# Patient Record
Sex: FEMALE | Race: WHITE | Employment: FULL TIME | URBAN - NONMETROPOLITAN AREA
[De-identification: names, ages, dates, MRNs, and addresses within clinical notes are randomized per-mention and may not be internally consistent; named-entity substitution may affect disease eponyms.]

---

## 2017-01-25 ENCOUNTER — TELEPHONE (OUTPATIENT)
Dept: FAMILY MEDICINE CLINIC | Age: 57
End: 2017-01-25

## 2017-01-25 VITALS — SYSTOLIC BLOOD PRESSURE: 158 MMHG | DIASTOLIC BLOOD PRESSURE: 90 MMHG

## 2017-01-25 DIAGNOSIS — Z01.30 BP CHECK: Primary | ICD-10-CM

## 2017-01-30 ENCOUNTER — TELEPHONE (OUTPATIENT)
Dept: FAMILY MEDICINE CLINIC | Age: 57
End: 2017-01-30

## 2017-01-30 VITALS — DIASTOLIC BLOOD PRESSURE: 98 MMHG | SYSTOLIC BLOOD PRESSURE: 162 MMHG

## 2017-01-30 DIAGNOSIS — Z01.30 BP CHECK: Primary | ICD-10-CM

## 2017-02-10 ENCOUNTER — OFFICE VISIT (OUTPATIENT)
Dept: FAMILY MEDICINE CLINIC | Age: 57
End: 2017-02-10

## 2017-02-10 VITALS
DIASTOLIC BLOOD PRESSURE: 82 MMHG | SYSTOLIC BLOOD PRESSURE: 138 MMHG | WEIGHT: 187 LBS | BODY MASS INDEX: 29.35 KG/M2 | HEART RATE: 98 BPM | HEIGHT: 67 IN

## 2017-02-10 DIAGNOSIS — I10 ESSENTIAL HYPERTENSION: Primary | ICD-10-CM

## 2017-02-10 PROCEDURE — 99213 OFFICE O/P EST LOW 20 MIN: CPT | Performed by: NURSE PRACTITIONER

## 2017-02-10 RX ORDER — LISINOPRIL 40 MG/1
40 TABLET ORAL DAILY
Qty: 90 TABLET | Refills: 1 | Status: SHIPPED | OUTPATIENT
Start: 2017-02-10 | End: 2017-11-15 | Stop reason: SDUPTHER

## 2017-02-10 ASSESSMENT — ENCOUNTER SYMPTOMS
SHORTNESS OF BREATH: 0
COUGH: 0
WHEEZING: 0

## 2017-02-13 ENCOUNTER — NURSE ONLY (OUTPATIENT)
Dept: SURGERY | Age: 57
End: 2017-02-13

## 2017-02-13 VITALS
BODY MASS INDEX: 29.6 KG/M2 | HEIGHT: 67 IN | SYSTOLIC BLOOD PRESSURE: 150 MMHG | DIASTOLIC BLOOD PRESSURE: 90 MMHG | WEIGHT: 188.6 LBS | HEART RATE: 84 BPM

## 2017-02-13 DIAGNOSIS — Z12.11 COLON CANCER SCREENING: Primary | ICD-10-CM

## 2017-02-14 RX ORDER — POLYETHYLENE GLYCOL 3350, SODIUM CHLORIDE, SODIUM BICARBONATE, POTASSIUM CHLORIDE 420; 11.2; 5.72; 1.48 G/4L; G/4L; G/4L; G/4L
POWDER, FOR SOLUTION ORAL
Qty: 4000 ML | Refills: 0 | Status: SHIPPED | OUTPATIENT
Start: 2017-02-14 | End: 2017-06-14 | Stop reason: ALTCHOICE

## 2017-02-16 ENCOUNTER — NURSE ONLY (OUTPATIENT)
Dept: LAB | Age: 57
End: 2017-02-16

## 2017-02-16 DIAGNOSIS — Z01.30 BP CHECK: Primary | ICD-10-CM

## 2017-02-28 ENCOUNTER — TELEPHONE (OUTPATIENT)
Dept: SURGERY | Age: 57
End: 2017-02-28

## 2017-05-22 ENCOUNTER — NURSE ONLY (OUTPATIENT)
Dept: LAB | Age: 57
End: 2017-05-22

## 2017-05-22 VITALS — DIASTOLIC BLOOD PRESSURE: 94 MMHG | SYSTOLIC BLOOD PRESSURE: 142 MMHG

## 2017-05-22 DIAGNOSIS — Z01.30 BLOOD PRESSURE CHECK: Primary | ICD-10-CM

## 2017-05-23 ENCOUNTER — NURSE ONLY (OUTPATIENT)
Dept: LAB | Age: 57
End: 2017-05-23

## 2017-05-23 VITALS — SYSTOLIC BLOOD PRESSURE: 146 MMHG | DIASTOLIC BLOOD PRESSURE: 88 MMHG

## 2017-05-23 DIAGNOSIS — Z01.30 BLOOD PRESSURE CHECK: Primary | ICD-10-CM

## 2017-06-14 ENCOUNTER — OFFICE VISIT (OUTPATIENT)
Dept: FAMILY MEDICINE CLINIC | Age: 57
End: 2017-06-14
Payer: COMMERCIAL

## 2017-06-14 VITALS
SYSTOLIC BLOOD PRESSURE: 150 MMHG | DIASTOLIC BLOOD PRESSURE: 100 MMHG | BODY MASS INDEX: 29.23 KG/M2 | WEIGHT: 186.6 LBS | HEART RATE: 80 BPM

## 2017-06-14 DIAGNOSIS — E78.2 MIXED HYPERLIPIDEMIA: ICD-10-CM

## 2017-06-14 DIAGNOSIS — I10 ESSENTIAL HYPERTENSION: Primary | ICD-10-CM

## 2017-06-14 DIAGNOSIS — E11.9 TYPE 2 DIABETES MELLITUS WITHOUT COMPLICATION, UNSPECIFIED LONG TERM INSULIN USE STATUS: ICD-10-CM

## 2017-06-14 PROCEDURE — 99213 OFFICE O/P EST LOW 20 MIN: CPT | Performed by: NURSE PRACTITIONER

## 2017-06-14 RX ORDER — LISINOPRIL 20 MG/1
20 TABLET ORAL DAILY
Qty: 90 TABLET | Refills: 1 | Status: CANCELLED | OUTPATIENT
Start: 2017-06-14

## 2017-06-14 RX ORDER — HYDROCHLOROTHIAZIDE 12.5 MG/1
12.5 CAPSULE, GELATIN COATED ORAL DAILY
Qty: 90 CAPSULE | Refills: 1 | Status: SHIPPED | OUTPATIENT
Start: 2017-06-14 | End: 2017-12-01 | Stop reason: SDUPTHER

## 2017-06-14 RX ORDER — LISINOPRIL 40 MG/1
40 TABLET ORAL DAILY
Qty: 90 TABLET | Refills: 1 | Status: SHIPPED | OUTPATIENT
Start: 2017-06-14 | End: 2017-12-14 | Stop reason: SDUPTHER

## 2017-06-14 RX ORDER — ATORVASTATIN CALCIUM 10 MG/1
10 TABLET, FILM COATED ORAL DAILY
Qty: 90 TABLET | Refills: 1 | Status: SHIPPED | OUTPATIENT
Start: 2017-06-14 | End: 2017-12-14 | Stop reason: SDUPTHER

## 2017-06-14 ASSESSMENT — ENCOUNTER SYMPTOMS
COUGH: 0
WHEEZING: 0
SHORTNESS OF BREATH: 0
NAUSEA: 0
DIARRHEA: 0
BLURRED VISION: 0
VOMITING: 0

## 2017-06-14 ASSESSMENT — PATIENT HEALTH QUESTIONNAIRE - PHQ9
SUM OF ALL RESPONSES TO PHQ9 QUESTIONS 1 & 2: 0
1. LITTLE INTEREST OR PLEASURE IN DOING THINGS: 0
SUM OF ALL RESPONSES TO PHQ QUESTIONS 1-9: 0
2. FEELING DOWN, DEPRESSED OR HOPELESS: 0

## 2017-07-26 ENCOUNTER — NURSE ONLY (OUTPATIENT)
Dept: LAB | Age: 57
End: 2017-07-26

## 2017-07-26 VITALS — SYSTOLIC BLOOD PRESSURE: 130 MMHG | DIASTOLIC BLOOD PRESSURE: 78 MMHG

## 2017-07-26 DIAGNOSIS — Z01.30 BLOOD PRESSURE CHECK: Primary | ICD-10-CM

## 2017-11-08 ENCOUNTER — OFFICE VISIT (OUTPATIENT)
Dept: FAMILY MEDICINE CLINIC | Age: 57
End: 2017-11-08
Payer: COMMERCIAL

## 2017-11-08 VITALS
OXYGEN SATURATION: 98 % | HEIGHT: 67 IN | SYSTOLIC BLOOD PRESSURE: 122 MMHG | DIASTOLIC BLOOD PRESSURE: 78 MMHG | WEIGHT: 179.2 LBS | HEART RATE: 110 BPM | BODY MASS INDEX: 28.12 KG/M2

## 2017-11-08 DIAGNOSIS — R42 VERTIGO: Primary | ICD-10-CM

## 2017-11-08 DIAGNOSIS — G56.20 ULNAR TUNNEL SYNDROME, UNSPECIFIED LATERALITY: ICD-10-CM

## 2017-11-08 PROCEDURE — 99213 OFFICE O/P EST LOW 20 MIN: CPT | Performed by: NURSE PRACTITIONER

## 2017-11-08 ASSESSMENT — ENCOUNTER SYMPTOMS
SHORTNESS OF BREATH: 0
COUGH: 0
WHEEZING: 0

## 2017-11-08 NOTE — PROGRESS NOTES
Subjective:      Patient ID: Hailee Jefferson is a 56 y.o. female. Pt presents to the clinic to discuss her dizziness. Pt states that for the last 2-4 weeks she has been dizzy when she stands up. She is drinking a few beers about 3 times a week. She does smoke marijuana on a weekly basis but nothing is new. She does not drink water hardly at all. She is monitoring her blood sugars which have not changed. She does not check her BP when she gets dizzy. She states that she just feels dizzy for a few minutes when she stands up and then she is fine. She is also complaining of numbness in her 4th and 5th digits on her bilateral hands. She states that she can feel things but they feel \"weird. \"        Review of Systems   Constitutional: Negative for activity change, appetite change and fever. Respiratory: Negative for cough, shortness of breath and wheezing. Cardiovascular: Negative for chest pain and palpitations. Musculoskeletal:        Bilateral hand tingling 4th and 5th digits     Neurological: Positive for dizziness.      Past Medical History:   Diagnosis Date    Cervicogenic headache     Chronic sinusitis     Chronic sinusitis     Conductive hearing loss     Degenerative arthritis of cervical spine     Depression     Depression     Elevated fasting glucose     Elevated fasting glucose     Epigastric abdominal pain     GERD (gastroesophageal reflux disease)     GERD (gastroesophageal reflux disease)     History of cervical dysplasia 1987    LASER    History of methamphetamine abuse     (Quit 6-2001)    LSIL (low grade squamous intraepithelial lesion) on Pap smear 2002    Cryo    Tobacco abuse     Tobacco abuse        Past Surgical History:   Procedure Laterality Date    CATARACT REMOVAL Bilateral     CATARACT REMOVAL WITH IMPLANT Left 3/22/2016    Dr. Leonila Meza, [address] COLONOSCOPY  03/03/2017    Dr. Sandra Lei, [facility] OTHER SURGICAL HISTORY  03/10/1987    Laser conization of cervix (Hx of moderate dysplasia on Pap test).  UPPER GASTROINTESTINAL ENDOSCOPY  05/02/2013    Mild gastritis. Social History     Social History    Marital status:      Spouse name: N/A    Number of children: N/A    Years of education: N/A     Social History Main Topics    Smoking status: Current Every Day Smoker     Packs/day: 0.25     Years: 40.00    Smokeless tobacco: Never Used    Alcohol use 0.0 oz/week      Comment: socially    Drug use: No      Comment: Quit . [facility].     Sexual activity: Not Asked     Other Topics Concern    None     Social History Narrative    None       Family History   Problem Relation Age of Onset    Diabetes Mother     Osteoporosis Mother     High Cholesterol Father     Lung Cancer Father     Diabetes Other     Diabetes Other        No Known Allergies    Current Outpatient Prescriptions   Medication Sig Dispense Refill    metFORMIN (GLUCOPHAGE) 500 MG tablet Take 1 tablet by mouth 2 times daily 180 tablet 1    atorvastatin (LIPITOR) 10 MG tablet Take 1 tablet by mouth daily 90 tablet 1    lisinopril (PRINIVIL;ZESTRIL) 40 MG tablet Take 1 tablet by mouth daily 90 tablet 1    hydrochlorothiazide (MICROZIDE) 12.5 MG capsule Take 1 capsule by mouth daily 90 capsule 1    lisinopril (PRINIVIL;ZESTRIL) 40 MG tablet Take 1 tablet by mouth daily 90 tablet 1    Glucose Blood (BLOOD GLUCOSE TEST STRIPS) STRP Diagnosis 250.90, tests once daily 50 strip 11    Lancets MISC Diagnosis 250.90, tests once daily 50 each 11    lisinopril (PRINIVIL;ZESTRIL) 20 MG tablet Take 1 tablet by mouth daily 90 tablet 1    FLUoxetine (PROZAC) 20 MG capsule Take 60 mg by mouth daily       lamoTRIgine (LAMICTAL) 200 MG tablet Take 200 mg by mouth daily      modafinil (PROVIGIL) 200 MG tablet Take 200 mg by mouth 2 times daily      Blood Glucose Monitoring Suppl CARY Diagnosis 250.90,

## 2017-11-08 NOTE — PATIENT INSTRUCTIONS
adapted under license by [facility] ([facility]). If you have questions about a medical condition or this instruction, always ask your healthcare professional. [address] any warranty or liability for your use of this information.

## 2017-11-15 ENCOUNTER — OFFICE VISIT (OUTPATIENT)
Dept: FAMILY MEDICINE CLINIC | Age: 57
End: 2017-11-15
Payer: COMMERCIAL

## 2017-11-15 ENCOUNTER — HOSPITAL ENCOUNTER (OUTPATIENT)
Dept: LAB | Age: 57
Setting detail: SPECIMEN
Discharge: HOME | End: 2017-11-15
Payer: COMMERCIAL

## 2017-11-15 VITALS
SYSTOLIC BLOOD PRESSURE: 110 MMHG | HEART RATE: 78 BPM | DIASTOLIC BLOOD PRESSURE: 70 MMHG | HEIGHT: 66 IN | BODY MASS INDEX: 28.32 KG/M2 | WEIGHT: 176.2 LBS

## 2017-11-15 DIAGNOSIS — F10.10 ALCOHOL ABUSE: ICD-10-CM

## 2017-11-15 DIAGNOSIS — M79.671 FOOT PAIN, BILATERAL: ICD-10-CM

## 2017-11-15 DIAGNOSIS — M79.672 FOOT PAIN, BILATERAL: Primary | ICD-10-CM

## 2017-11-15 DIAGNOSIS — M79.672 FOOT PAIN, BILATERAL: ICD-10-CM

## 2017-11-15 DIAGNOSIS — M79.671 FOOT PAIN, BILATERAL: Primary | ICD-10-CM

## 2017-11-15 LAB
FOLATE: >20 NG/ML
VITAMIN B-12: 466 PG/ML (ref 211–946)

## 2017-11-15 PROCEDURE — 36415 COLL VENOUS BLD VENIPUNCTURE: CPT

## 2017-11-15 PROCEDURE — 99213 OFFICE O/P EST LOW 20 MIN: CPT | Performed by: NURSE PRACTITIONER

## 2017-11-15 PROCEDURE — 82746 ASSAY OF FOLIC ACID SERUM: CPT

## 2017-11-15 PROCEDURE — 82607 VITAMIN B-12: CPT

## 2017-11-17 ENCOUNTER — HOSPITAL ENCOUNTER (OUTPATIENT)
Dept: INTERVENTIONAL RADIOLOGY/VASCULAR | Age: 57
Discharge: HOME | End: 2017-11-17
Payer: COMMERCIAL

## 2017-11-17 DIAGNOSIS — M79.672 FOOT PAIN, BILATERAL: ICD-10-CM

## 2017-11-17 DIAGNOSIS — M79.671 FOOT PAIN, BILATERAL: ICD-10-CM

## 2017-11-17 PROCEDURE — 93922 UPR/L XTREMITY ART 2 LEVELS: CPT

## 2017-11-17 ASSESSMENT — ENCOUNTER SYMPTOMS
WHEEZING: 0
SHORTNESS OF BREATH: 0
COUGH: 0

## 2017-11-17 NOTE — PROGRESS NOTES
skin breakdown, erythema, rub spots, blisters, scaling, or ulcers. No calluses or corns. Assessment:      1. Foot pain, bilateral  Vitamin B12 & Folate    VL ANKLE ART BRACHIAL INDICES EXTREMITY BILATERAL   2. Alcohol abuse  Vitamin B12 & Folate           Plan:       Will order a B12 and folate study  Will also order CARLYN's   Will call with all results  Further treatment will be based on results  Pt to return as scheduled sooner if needed

## 2017-11-21 ENCOUNTER — TELEPHONE (OUTPATIENT)
Dept: FAMILY MEDICINE CLINIC | Age: 57
End: 2017-11-21

## 2017-11-21 DIAGNOSIS — M79.671 FOOT PAIN, BILATERAL: ICD-10-CM

## 2017-11-21 DIAGNOSIS — R29.91 ABNORMAL FINDING OF LOWER EXTREMITY: ICD-10-CM

## 2017-11-21 DIAGNOSIS — F10.10 ALCOHOL ABUSE: Primary | ICD-10-CM

## 2017-11-21 DIAGNOSIS — M79.672 FOOT PAIN, BILATERAL: ICD-10-CM

## 2017-11-21 DIAGNOSIS — E11.9 TYPE 2 DIABETES MELLITUS WITHOUT COMPLICATION, UNSPECIFIED LONG TERM INSULIN USE STATUS: ICD-10-CM

## 2017-11-21 NOTE — TELEPHONE ENCOUNTER
----- Message from Yancy Fink NP sent at 11/21/2017 11:48 AM EST -----  Normal CARLYN's but would like to refer to vascular for evaluation. Pt is due for routine labs in December and would like to order those to be done.  Please place orders for lipid panel, CMP, A1C

## 2017-12-01 RX ORDER — HYDROCHLOROTHIAZIDE 12.5 MG/1
12.5 CAPSULE, GELATIN COATED ORAL DAILY
Qty: 90 CAPSULE | Refills: 0 | Status: SHIPPED | OUTPATIENT
Start: 2017-12-01 | End: 2018-02-23 | Stop reason: SDUPTHER

## 2017-12-05 ENCOUNTER — OFFICE VISIT (OUTPATIENT)
Dept: OBGYN | Age: 57
End: 2017-12-05
Payer: COMMERCIAL

## 2017-12-05 ENCOUNTER — HOSPITAL ENCOUNTER (OUTPATIENT)
Age: 57
Setting detail: SPECIMEN
Discharge: HOME | End: 2017-12-05
Payer: COMMERCIAL

## 2017-12-05 ENCOUNTER — HOSPITAL ENCOUNTER (OUTPATIENT)
Dept: MAMMOGRAPHY | Age: 57
Discharge: HOME | End: 2017-12-05
Payer: COMMERCIAL

## 2017-12-05 VITALS
DIASTOLIC BLOOD PRESSURE: 78 MMHG | HEART RATE: 82 BPM | BODY MASS INDEX: 28.96 KG/M2 | WEIGHT: 180.2 LBS | HEIGHT: 66 IN | SYSTOLIC BLOOD PRESSURE: 120 MMHG

## 2017-12-05 DIAGNOSIS — Z01.419 WELL FEMALE EXAM WITH ROUTINE GYNECOLOGICAL EXAM: Primary | ICD-10-CM

## 2017-12-05 DIAGNOSIS — Z12.4 CERVICAL CANCER SCREENING: ICD-10-CM

## 2017-12-05 DIAGNOSIS — Z12.31 VISIT FOR SCREENING MAMMOGRAM: ICD-10-CM

## 2017-12-05 DIAGNOSIS — Z78.0 MENOPAUSE: ICD-10-CM

## 2017-12-05 DIAGNOSIS — Z12.31 SCREENING MAMMOGRAM, ENCOUNTER FOR: ICD-10-CM

## 2017-12-05 PROCEDURE — 77063 BREAST TOMOSYNTHESIS BI: CPT

## 2017-12-05 PROCEDURE — 99396 PREV VISIT EST AGE 40-64: CPT | Performed by: NURSE PRACTITIONER

## 2017-12-05 PROCEDURE — G0145 SCR C/V CYTO,THINLAYER,RESCR: HCPCS

## 2017-12-05 NOTE — PROGRESS NOTES
50 each 11    lisinopril (PRINIVIL;ZESTRIL) 20 MG tablet Take 1 tablet by mouth daily 90 tablet 1    FLUoxetine (PROZAC) 20 MG capsule Take 60 mg by mouth daily       lamoTRIgine (LAMICTAL) 200 MG tablet Take 200 mg by mouth daily      modafinil (PROVIGIL) 200 MG tablet Take 200 mg by mouth 2 times daily      Blood Glucose Monitoring Suppl CARY Diagnosis 250.90, tests once daily 1 Device 0    Cholecalciferol (VITAMIN D PO) Take by mouth      SAPHRIS 10 MG SUBL sublingual tablet       Multiple Vitamins-Minerals (MULTIVITAMIN PO) Take  by mouth daily.  omeprazole (PRILOSEC) 20 MG capsule Take 20 mg by mouth daily. No current facility-administered medications for this visit. ALLERGIES:  Allergies as of 12/05/2017    (No Known Allergies)           Gynecologic History:  Menarche: 17 yo  Menopause at 48 yo     Patient's last menstrual period was 08/28/2016. Hormone Exposure: No    Family History of Breast, Ovarian , Colon or Uterine Cancer: No     Preventative Health Testing:  Date of Last Pap Smear: 2016  Date of Last Mammogram: 2017  Date of Last Colonoscopy: 2017  Date of Last Bone Density: none  ________________________________________________________________________  REVIEW OF SYSTEMS:     A minimum of an eleven point review of systems was completed.     Review Of Systems (11 point):  General ROS:  negative  Hematological and Lymphatic ROS:negative   Breast ROS: negative  Cardiovascular ROS: negative  Respiratory ROS: negative   Gastrointestinal ROS: negative  Genito-Urinary ROS: negative  Psychological ROS: negative  Neurological ROS: negative  Musculoskeletal ROS: negative  Dermatological ROS: negative                                                                                                                                                                                   PHYSICAL Exam:     Constitutional:  Blood pressure 120/78, pulse 82, height 5' 6\" (1.676 m), weight 180 lb 3.2 oz single, nontender and anteverted    Musculosk:  Normal Gait and station was noted. Digits were evaluated without abnormal findings. Range of motion, stability and strength were evaluated and found to be appropriate for the patients age. ASSESSMENT:      56 y.o. Annual  1. Screening mammogram, encounter for  SAL DIGITAL SCREEN W CAD BILATERAL   2. Cervical cancer screening  PAP SMEAR   3. Menopause  DEXA Bone Density Axial Skeleton   4. Well female exam with routine gynecological exam          Chief Complaint   Patient presents with    Gynecologic Exam     annual exam and pap         Past Medical History:   Diagnosis Date    Cervicogenic headache     Chronic sinusitis     Chronic sinusitis     Conductive hearing loss     Degenerative arthritis of cervical spine     Depression     Depression     Elevated fasting glucose     Elevated fasting glucose     Epigastric abdominal pain     GERD (gastroesophageal reflux disease)     GERD (gastroesophageal reflux disease)     History of cervical dysplasia 1987    LASER    History of methamphetamine abuse     (Quit 6-2001)    LSIL (low grade squamous intraepithelial lesion) on Pap smear 2002    Cryo    Tobacco abuse     Tobacco abuse          Patient Active Problem List   Diagnosis    Depression    GERD (gastroesophageal reflux disease)    Tobacco abuse    Type 2 diabetes mellitus without complication ([address]    Left cataract    Alcohol abuse    Obstructive sleep apnea syndrome    Mixed hyperlipidemia    Essential hypertension          Hereditary Breast, Ovarian, Colon and Uterine Cancer screening Done. Tobacco & Secondary smoke risks reviewed; instructed on cessation and avoidance    PLAN:  Return in about 1 year (around 12/5/2018) for Annual Exam.  Repeat pap per ASCCP 2013 guidelines  Return for annual exams  Mammograms every 1 year. If 35 yo and last mammogram was negative. Routine health maintenance per patients PCP.   Orders Placed This Encounter   Procedures    SAL DIGITAL SCREEN W CAD BILATERAL     Standing Status:   Future     Standing Expiration Date:   7/28/2020     Scheduling Instructions: On the day of the exam, the patient should not wear deodorant, lotion, or powder on the breast or underarm area. Wear a two piece outfit and be prepared to give information about prior breast procedures including mammograms, biopsies, or surgeries. If you've had mammograms done elsewhere, please request any previous mammogram films from those organizations prior to your appointment. Order Specific Question:   Reason for exam:     Answer:   SCREENING MAMMOGRAM    DEXA Bone Density Axial Skeleton     Standing Status:   Future     Standing Expiration Date:   2/28/2019     Scheduling Instructions:      Do not take calcium supplements the day of test     Order Specific Question:   Reason for exam:     Answer:   Screening osteoporosis    PAP SMEAR     Patient History:    Patient's last menstrual period was 08/28/2016. OBGYN Status: Postmenopausal  Past Surgical History:  No date: CATARACT REMOVAL Bilateral  3/22/2016: CATARACT REMOVAL WITH IMPLANT Left      Comment: [facility] Teodoro Connor, [facility]  No date: [address]  03/03/2017: COLONOSCOPY      Comment: Dr. Lisa Garcia, [facility]  03/10/1987: OTHER SURGICAL HISTORY      Comment: Laser conization of cervix (Hx of moderate                dysplasia on Pap test). 05/02/2013: UPPER GASTROINTESTINAL ENDOSCOPY      Comment: Mild gastritis. Smoking status: Current Every Day Smoker                                                   Packs/day: 0.25      Years: 40.00     Smokeless tobacco: Never Used                           Standing Status:   Future     Standing Expiration Date:   12/5/2018     Order Specific Question:   Collection Type     Answer:    Thin Prep     Order Specific Question:   Prior Abnormal Pap Test     Answer:   Yes     Order Specific Question:   If Prior Abnormal, Give Date     Answer:   2002 LSIL     Order Specific Question:   Prior Treatment     Answer:   Cryotherapy     Order Specific Question:   Screening or Diagnostic     Answer:   Screening     Order Specific Question:   HPV Requested?      Answer:   Yes - If Abnormal Reflex HPV     Order Specific Question:   High Risk Patient     Answer:   N/A       Abirl Mayorga  12/5/2017      (Please note that portions of this note were completed with a voice-recognition program. Efforts were made to edit the dictations but occasionally words are mis-transcribed.)

## 2017-12-06 DIAGNOSIS — R92.8 FOLLOW-UP EXAMINATION OF ABNORMAL MAMMOGRAM: Primary | ICD-10-CM

## 2017-12-11 ENCOUNTER — HOSPITAL ENCOUNTER (OUTPATIENT)
Dept: MAMMOGRAPHY | Age: 57
Discharge: HOME | End: 2017-12-11
Payer: COMMERCIAL

## 2017-12-11 ENCOUNTER — HOSPITAL ENCOUNTER (OUTPATIENT)
Dept: ULTRASOUND IMAGING | Age: 57
Discharge: HOME | End: 2017-12-11
Payer: COMMERCIAL

## 2017-12-11 DIAGNOSIS — R92.8 FOLLOW-UP EXAMINATION OF ABNORMAL MAMMOGRAM: ICD-10-CM

## 2017-12-11 PROCEDURE — G0279 TOMOSYNTHESIS, MAMMO: HCPCS

## 2017-12-11 PROCEDURE — 76642 ULTRASOUND BREAST LIMITED: CPT

## 2017-12-12 ENCOUNTER — HOSPITAL ENCOUNTER (OUTPATIENT)
Dept: BONE DENSITY | Age: 57
Discharge: HOME | End: 2017-12-12
Payer: COMMERCIAL

## 2017-12-12 DIAGNOSIS — Z78.0 MENOPAUSE: ICD-10-CM

## 2017-12-12 LAB — CYTOLOGY REPORT: NORMAL

## 2017-12-14 ENCOUNTER — HOSPITAL ENCOUNTER (OUTPATIENT)
Dept: BONE DENSITY | Age: 57
Discharge: HOME | End: 2017-12-14
Payer: COMMERCIAL

## 2017-12-14 ENCOUNTER — OFFICE VISIT (OUTPATIENT)
Dept: FAMILY MEDICINE CLINIC | Age: 57
End: 2017-12-14
Payer: COMMERCIAL

## 2017-12-14 ENCOUNTER — HOSPITAL ENCOUNTER (OUTPATIENT)
Dept: LAB | Age: 57
Setting detail: SPECIMEN
Discharge: HOME | End: 2017-12-14
Payer: COMMERCIAL

## 2017-12-14 VITALS
HEIGHT: 66 IN | SYSTOLIC BLOOD PRESSURE: 122 MMHG | DIASTOLIC BLOOD PRESSURE: 72 MMHG | BODY MASS INDEX: 29.35 KG/M2 | HEART RATE: 92 BPM | WEIGHT: 182.6 LBS | OXYGEN SATURATION: 98 %

## 2017-12-14 DIAGNOSIS — E11.9 TYPE 2 DIABETES MELLITUS WITHOUT COMPLICATION, UNSPECIFIED LONG TERM INSULIN USE STATUS: ICD-10-CM

## 2017-12-14 DIAGNOSIS — F10.10 ALCOHOL ABUSE: ICD-10-CM

## 2017-12-14 DIAGNOSIS — E11.9 TYPE 2 DIABETES MELLITUS WITHOUT COMPLICATION, UNSPECIFIED LONG TERM INSULIN USE STATUS: Primary | ICD-10-CM

## 2017-12-14 DIAGNOSIS — M79.2 PERIPHERAL NEUROPATHIC PAIN: ICD-10-CM

## 2017-12-14 DIAGNOSIS — E78.2 MIXED HYPERLIPIDEMIA: ICD-10-CM

## 2017-12-14 DIAGNOSIS — I10 ESSENTIAL HYPERTENSION: ICD-10-CM

## 2017-12-14 LAB
ALBUMIN SERPL-MCNC: 4.7 G/DL (ref 3.5–5.2)
ALBUMIN/GLOBULIN RATIO: 1.7 (ref 1–2.5)
ALP BLD-CCNC: 156 U/L (ref 35–104)
ALT SERPL-CCNC: 44 U/L (ref 5–33)
ANION GAP SERPL CALCULATED.3IONS-SCNC: 14 MMOL/L (ref 9–17)
AST SERPL-CCNC: 34 U/L
BILIRUB SERPL-MCNC: 0.18 MG/DL (ref 0.3–1.2)
BUN BLDV-MCNC: 13 MG/DL (ref 6–20)
BUN/CREAT BLD: 14 (ref 9–20)
CALCIUM SERPL-MCNC: 10.1 MG/DL (ref 8.6–10.4)
CHLORIDE BLD-SCNC: 103 MMOL/L (ref 98–107)
CHOLESTEROL/HDL RATIO: 2.6
CHOLESTEROL: 212 MG/DL
CO2: 26 MMOL/L (ref 20–31)
CREAT SERPL-MCNC: 0.94 MG/DL (ref 0.5–0.9)
ESTIMATED AVERAGE GLUCOSE: 111 MG/DL
GFR AFRICAN AMERICAN: >60 ML/MIN
GFR NON-AFRICAN AMERICAN: >60 ML/MIN
GFR SERPL CREATININE-BSD FRML MDRD: ABNORMAL ML/MIN/{1.73_M2}
GFR SERPL CREATININE-BSD FRML MDRD: ABNORMAL ML/MIN/{1.73_M2}
GLUCOSE BLD-MCNC: 107 MG/DL (ref 70–99)
HBA1C MFR BLD: 5.5 % (ref 4.8–5.9)
HDLC SERPL-MCNC: 81 MG/DL
LDL CHOLESTEROL: 96 MG/DL (ref 0–130)
POTASSIUM SERPL-SCNC: 5.4 MMOL/L (ref 3.7–5.3)
SODIUM BLD-SCNC: 143 MMOL/L (ref 135–144)
TOTAL PROTEIN: 7.4 G/DL (ref 6.4–8.3)
TRIGL SERPL-MCNC: 174 MG/DL
VLDLC SERPL CALC-MCNC: ABNORMAL MG/DL (ref 1–30)

## 2017-12-14 PROCEDURE — 83036 HEMOGLOBIN GLYCOSYLATED A1C: CPT

## 2017-12-14 PROCEDURE — 77080 DXA BONE DENSITY AXIAL: CPT

## 2017-12-14 PROCEDURE — 80053 COMPREHEN METABOLIC PANEL: CPT

## 2017-12-14 PROCEDURE — 36415 COLL VENOUS BLD VENIPUNCTURE: CPT

## 2017-12-14 PROCEDURE — 99214 OFFICE O/P EST MOD 30 MIN: CPT | Performed by: NURSE PRACTITIONER

## 2017-12-14 PROCEDURE — 80061 LIPID PANEL: CPT

## 2017-12-14 RX ORDER — ATORVASTATIN CALCIUM 10 MG/1
10 TABLET, FILM COATED ORAL DAILY
Qty: 90 TABLET | Refills: 1 | Status: SHIPPED | OUTPATIENT
Start: 2017-12-14 | End: 2018-04-15 | Stop reason: SDUPTHER

## 2017-12-14 RX ORDER — LISINOPRIL 40 MG/1
40 TABLET ORAL DAILY
Qty: 90 TABLET | Refills: 1 | Status: SHIPPED | OUTPATIENT
Start: 2017-12-14 | End: 2018-06-14 | Stop reason: SDUPTHER

## 2017-12-14 RX ORDER — GABAPENTIN 300 MG/1
300 CAPSULE ORAL 3 TIMES DAILY
Qty: 90 CAPSULE | Refills: 3 | Status: SHIPPED | OUTPATIENT
Start: 2017-12-14 | End: 2018-01-17 | Stop reason: SDUPTHER

## 2017-12-14 RX ORDER — PREGABALIN 50 MG/1
50 CAPSULE ORAL 3 TIMES DAILY
Qty: 90 CAPSULE | Refills: 5 | Status: SHIPPED | OUTPATIENT
Start: 2017-12-14 | End: 2018-05-15

## 2017-12-14 ASSESSMENT — ENCOUNTER SYMPTOMS
VOMITING: 0
DIARRHEA: 0
SHORTNESS OF BREATH: 0
BLURRED VISION: 0
RHINORRHEA: 0
SINUS PRESSURE: 0
COUGH: 0
SORE THROAT: 0
WHEEZING: 0
SINUS PAIN: 0
NAUSEA: 0

## 2017-12-14 NOTE — TELEPHONE ENCOUNTER
----- Message from Rylan Perkins NP sent at 12/14/2017  1:23 PM EST -----  Pt's diabetes is stable. Her triglycerides are a little more elevated. She is to watch her diet and increase exercise. All her other labs are stable.

## 2017-12-14 NOTE — PROGRESS NOTES
Marymadisyn Marck received counseling on the following healthy behaviors: nutrition, exercise and medication adherence  Reviewed prior labs and health maintenance  Continue current medications, diet and exercise. Discussed use, benefit, and side effects of prescribed medications. Barriers to medication compliance addressed. Patient given educational materials - see patient instructions  Was a self-tracking handout given in paper form or via [employer]? Yes    Requested Prescriptions      No prescriptions requested or ordered in this encounter       All patient questions answered. Patient voiced understanding. Quality Measures    Body mass index is 29.92 kg/m². Elevated. Weight control planned discussed Healthy diet and regular exercise. BP: 122/72 Blood pressure is normal. Treatment plan consists of No treatment change needed.     Lab Results   Component Value Date    LDLCHOLESTEROL 55 06/27/2017    (goal LDL reduction with dx if diabetes is 50% LDL reduction)      PHQ Scores 6/14/2017   PHQ2 Score 0   PHQ9 Score 0     Interpretation of Total Score Depression Severity: 1-4 = Minimal depression, 5-9 = Mild depression, 10-14 = Moderate depression, 15-19 = Moderately severe depression, 20-27 = Severe depression

## 2017-12-14 NOTE — PROGRESS NOTES
Subjective:      Patient ID: Idalia Jackson is a 56 y.o. female. She is continuing to smoke against about medical advice. She is taking 40mg of lisinopril and 12.5mg of Hctz. She does come to the clinic to check her BP occasionally. She does check her BS and it runs about 110-130. Pt states that she drinks about 5 beers a week. She drinks on the weekends only now. Hypertension   This is a chronic problem. The current episode started more than 1 year ago. The problem is unchanged. The problem is controlled. Pertinent negatives include no anxiety, blurred vision, chest pain, headaches, palpitations, peripheral edema or shortness of breath. There are no associated agents to hypertension. Risk factors for coronary artery disease include obesity, smoking/tobacco exposure, stress, post-menopausal state and diabetes mellitus. Past treatments include ACE inhibitors and diuretics. The current treatment provides moderate improvement. Compliance problems include diet and exercise. Diabetes   She presents for her follow-up diabetic visit. She has type 2 diabetes mellitus. Disease course: due for labs. Pertinent negatives for hypoglycemia include no dizziness, headaches or nervousness/anxiousness. Associated symptoms include foot paresthesias. Pertinent negatives for diabetes include no blurred vision, no chest pain, no polydipsia and no polyuria. There are no hypoglycemic complications. Diabetic symptom progression: due for labs. Diabetic complications include peripheral neuropathy. Risk factors for coronary artery disease include diabetes mellitus, hypertension, obesity, sedentary lifestyle and tobacco exposure. Current diabetic treatment includes oral agent (monotherapy). She is compliant with treatment most of the time. Her weight is stable. She is following a generally healthy diet. Meal planning includes avoidance of concentrated sweets. She has not had a previous visit with a dietitian.  She participates in is warm and dry. Nursing note and vitals reviewed. Assessment:      1. Type 2 diabetes mellitus without complication, unspecified long term insulin use status ([facility] [address]     2. Essential hypertension     3. Mixed hyperlipidemia     4. Peripheral neuropathic pain  pregabalin (LYRICA) 50 MG capsule           Plan:    she is to get her blood work done. Will call with results  Further treatment will be based on results  Will refill medications dependent on lab results  Will try lyrica 50mg TID to see if that helps with the foot pain  Pt is to continue to monitor her BP  She is to increase her activity as tolerated  She is to keep her appt with vascular surgery   She is to return in 6 months with labs prior   Pt to return PRN      Controlled Substances Monitoring:     Attestation: The Prescription Monitoring Report for this patient was reviewed today. Deni Matos NP)  Documentation: No signs of potential drug abuse or diversion identified.  Deni Matos NP)

## 2017-12-14 NOTE — PROGRESS NOTES
Chronic Disease Visit Information    BP Readings from Last 3 Encounters:   12/14/17 122/72   12/05/17 120/78   11/15/17 110/70          Hemoglobin A1C (%)   Date Value   06/27/2017 5.4   12/01/2016 6.0 (H)   06/01/2016 7.0 (H)     Microalb/Crt. Ratio (mcg/mg creat)   Date Value   06/27/2017 11     LDL Cholesterol (mg/dL)   Date Value   06/27/2017 55     HDL (mg/dL)   Date Value   06/27/2017 125     BUN (mg/dL)   Date Value   06/27/2017 10     CREATININE (mg/dL)   Date Value   06/27/2017 0.70     Glucose (mg/dL)   Date Value   06/27/2017 109 (H)            Have you changed or started any medications since your last visit including any over-the-counter medicines, vitamins, or herbal medicines? no   Are you having any side effects from any of your medications? -  no  Have you stopped taking any of your medications? Is so, why? -  no    Have you seen any other physician or provider since your last visit? No  Have you had any other diagnostic tests since your last visit? No  Have you been seen in the emergency room and/or had an admission to a hospital since we last saw you? No  Have you had your annual diabetic retinal (eye) exam? No  Have you had your routine dental cleaning in the past 6 months? no    Have you activated your [employer] account? If not, what are your barriers?  Yes     Patient Care Team:  Cherylene Leash, MD as PCP - General (Family Medicine)         Medical History Review  Past Medical, Family, and Social History reviewed and does contribute to the patient presenting condition    Health Maintenance   Topic Date Due    Diabetic retinal exam  02/28/2017    Flu vaccine (1) 09/01/2017    Diabetic foot exam  06/14/2018    Diabetic hemoglobin A1C test  06/27/2018    Diabetic microalbuminuria test  06/27/2018    Lipid screen  06/27/2018    Breast cancer screen  12/11/2019    Cervical cancer screen  12/05/2020    Colon cancer screen colonoscopy  03/03/2022    DTaP/Tdap/Td vaccine (2 - Td) 10/08/2022    Pneumococcal med risk  Completed    Hepatitis C screen  Completed    HIV screen  Addressed

## 2018-01-12 ENCOUNTER — TELEPHONE (OUTPATIENT)
Dept: FAMILY MEDICINE CLINIC | Age: 58
End: 2018-01-12

## 2018-01-15 NOTE — TELEPHONE ENCOUNTER
Message left for patient with the recommendations from [facility]. Patient was advised to call our office if she needed a new Rx with the new directions.

## 2018-01-17 DIAGNOSIS — G57.90 NEUROPATHY OF FOOT, UNSPECIFIED LATERALITY: Primary | ICD-10-CM

## 2018-01-17 RX ORDER — GABAPENTIN 300 MG/1
CAPSULE ORAL
Qty: 270 CAPSULE | Refills: 1 | Status: SHIPPED | OUTPATIENT
Start: 2018-01-17 | End: 2018-06-14 | Stop reason: ALTCHOICE

## 2018-01-17 NOTE — TELEPHONE ENCOUNTER
Last Appt:  12/14/2017  Next Appt:   6/14/2018  Med verified in [id number] [facility] on desk for review

## 2018-01-17 NOTE — TELEPHONE ENCOUNTER
Medication refilled    Controlled Substances Monitoring:     Attestation: The Prescription Monitoring Report for this patient was reviewed today. Chris Bradshaw NP)  Documentation: No signs of potential drug abuse or diversion identified.  Chris Bradshaw NP)

## 2018-02-09 ENCOUNTER — OFFICE VISIT (OUTPATIENT)
Dept: FAMILY MEDICINE CLINIC | Age: 58
End: 2018-02-09
Payer: COMMERCIAL

## 2018-02-09 VITALS
BODY MASS INDEX: 29.92 KG/M2 | WEIGHT: 186.2 LBS | DIASTOLIC BLOOD PRESSURE: 82 MMHG | HEIGHT: 66 IN | SYSTOLIC BLOOD PRESSURE: 130 MMHG | HEART RATE: 76 BPM

## 2018-02-09 DIAGNOSIS — G62.9 NEUROPATHY: Primary | ICD-10-CM

## 2018-02-09 DIAGNOSIS — E11.9 TYPE 2 DIABETES MELLITUS WITHOUT COMPLICATION, UNSPECIFIED LONG TERM INSULIN USE STATUS: ICD-10-CM

## 2018-02-09 DIAGNOSIS — E11.9 TYPE 2 DIABETES MELLITUS WITHOUT COMPLICATION, UNSPECIFIED LONG TERM INSULIN USE STATUS: Primary | ICD-10-CM

## 2018-02-09 PROCEDURE — 99213 OFFICE O/P EST LOW 20 MIN: CPT | Performed by: NURSE PRACTITIONER

## 2018-02-09 RX ORDER — DULOXETIN HYDROCHLORIDE 30 MG/1
30 CAPSULE, DELAYED RELEASE ORAL DAILY
Qty: 30 CAPSULE | Refills: 6 | Status: SHIPPED | OUTPATIENT
Start: 2018-02-09 | End: 2018-03-23 | Stop reason: SDUPTHER

## 2018-02-09 ASSESSMENT — ENCOUNTER SYMPTOMS
COUGH: 0
SHORTNESS OF BREATH: 0
WHEEZING: 0

## 2018-02-26 RX ORDER — HYDROCHLOROTHIAZIDE 12.5 MG/1
CAPSULE, GELATIN COATED ORAL
Qty: 90 CAPSULE | Refills: 1 | Status: SHIPPED | OUTPATIENT
Start: 2018-02-26 | End: 2018-06-14 | Stop reason: SDUPTHER

## 2018-03-08 ENCOUNTER — OFFICE VISIT (OUTPATIENT)
Dept: NEUROLOGY | Age: 58
End: 2018-03-08
Payer: COMMERCIAL

## 2018-03-08 ENCOUNTER — HOSPITAL ENCOUNTER (OUTPATIENT)
Dept: LAB | Age: 58
Setting detail: SPECIMEN
Discharge: HOME | End: 2018-03-08
Payer: COMMERCIAL

## 2018-03-08 VITALS
HEART RATE: 93 BPM | SYSTOLIC BLOOD PRESSURE: 136 MMHG | BODY MASS INDEX: 29.89 KG/M2 | WEIGHT: 186 LBS | TEMPERATURE: 98.6 F | DIASTOLIC BLOOD PRESSURE: 84 MMHG | HEIGHT: 66 IN | OXYGEN SATURATION: 99 %

## 2018-03-08 DIAGNOSIS — J32.9 CHRONIC SINUSITIS, UNSPECIFIED LOCATION: ICD-10-CM

## 2018-03-08 DIAGNOSIS — M79.605 BILATERAL LEG PAIN: ICD-10-CM

## 2018-03-08 DIAGNOSIS — G47.33 OBSTRUCTIVE SLEEP APNEA SYNDROME: ICD-10-CM

## 2018-03-08 DIAGNOSIS — K21.9 GASTROESOPHAGEAL REFLUX DISEASE WITHOUT ESOPHAGITIS: ICD-10-CM

## 2018-03-08 DIAGNOSIS — G63 POLYNEUROPATHY ASSOCIATED WITH UNDERLYING DISEASE (HCC): Primary | ICD-10-CM

## 2018-03-08 DIAGNOSIS — E11.9 TYPE 2 DIABETES MELLITUS WITHOUT COMPLICATION, UNSPECIFIED LONG TERM INSULIN USE STATUS: ICD-10-CM

## 2018-03-08 DIAGNOSIS — I10 ESSENTIAL HYPERTENSION: ICD-10-CM

## 2018-03-08 DIAGNOSIS — M79.604 BILATERAL LEG PAIN: ICD-10-CM

## 2018-03-08 DIAGNOSIS — F10.10 ALCOHOL ABUSE: ICD-10-CM

## 2018-03-08 DIAGNOSIS — F32.A DEPRESSION, UNSPECIFIED DEPRESSION TYPE: ICD-10-CM

## 2018-03-08 DIAGNOSIS — G62.9 NEUROPATHY: ICD-10-CM

## 2018-03-08 DIAGNOSIS — Z72.0 TOBACCO ABUSE: ICD-10-CM

## 2018-03-08 DIAGNOSIS — E78.2 MIXED HYPERLIPIDEMIA: ICD-10-CM

## 2018-03-08 LAB
FOLATE: >20 NG/ML
TSH SERPL DL<=0.05 MIU/L-ACNC: 1.54 MIU/L (ref 0.3–5)
VITAMIN B-12: 715 PG/ML (ref 232–1245)

## 2018-03-08 PROCEDURE — 82746 ASSAY OF FOLIC ACID SERUM: CPT

## 2018-03-08 PROCEDURE — 82607 VITAMIN B-12: CPT

## 2018-03-08 PROCEDURE — 86618 LYME DISEASE ANTIBODY: CPT

## 2018-03-08 PROCEDURE — 36415 COLL VENOUS BLD VENIPUNCTURE: CPT

## 2018-03-08 PROCEDURE — 99205 OFFICE O/P NEW HI 60 MIN: CPT | Performed by: PSYCHIATRY & NEUROLOGY

## 2018-03-08 PROCEDURE — 84443 ASSAY THYROID STIM HORMONE: CPT

## 2018-03-08 ASSESSMENT — ENCOUNTER SYMPTOMS
BOWEL INCONTINENCE: 0
NAUSEA: 0
SORE THROAT: 0
VISUAL CHANGE: 0
WHEEZING: 0
SHORTNESS OF BREATH: 0
COLOR CHANGE: 0
BACK PAIN: 0
FACIAL SWELLING: 0
EYE REDNESS: 0
COUGH: 0
DIARRHEA: 0
BLOOD IN STOOL: 0
EYE ITCHING: 0
EYE PAIN: 0
CHOKING: 0
TROUBLE SWALLOWING: 0
SINUS PRESSURE: 0
CONSTIPATION: 0
VOICE CHANGE: 0
EYE DISCHARGE: 0
ABDOMINAL DISTENTION: 0
APNEA: 0
PHOTOPHOBIA: 0
VOMITING: 0
ABDOMINAL PAIN: 0
CHEST TIGHTNESS: 0

## 2018-03-08 NOTE — PATIENT INSTRUCTIONS
drink.  · Get some exercise every day. Build up slowly to 30 to 60 minutes a day on 5 or more days of the week. · Take a fiber supplement, such as Citrucel or Metamucil, every day if needed. Read and follow all instructions on the label. · Schedule time each day for a bowel movement. Having a daily routine may help. Take your time and do not strain when having a bowel movement. · Ask your doctor about a laxative. The goal is to have one easy bowel movement every 1 to 2 days. Do not let constipation go untreated for more than 3 days. When should you call for help? Call your doctor now or seek immediate medical care if:  ? · You feel sad, anxious, or hopeless for more than a few days. This could mean you are depressed. Depression is common in people who have a lot of pain. But it can be treated. ? · You have trouble with bowel movements, such as:  ¨ No bowel movement in 3 days. ¨ Blood in the anal area, in your stool, or on the toilet paper. ¨ Diarrhea for more than 24 hours. ? Watch closely for changes in your health, and be sure to contact your doctor if:  ? · Your pain is getting worse. ? · You can't sleep because of pain. ? · You are very worried or anxious about your pain. ? · You have trouble taking your pain medicine. ? · You have any concerns about your pain medicine or its side effects. ? · You have vomiting or cramps for more than 2 hours. Where can you learn more? Go to [email or web address] and sign in to your [employer] account. Enter H953 in the [facility] box to learn more about \"Neuropathic Pain: Care Instructions. \"     If you do not have an account, please click on the \"Sign Up Now\" link. Current as of: October 14, 2016  Content Version: 11.5  © 1829-0253 [employer]. Care instructions adapted under license by [facility] ([facility]).  If you have questions about a medical condition or this instruction, always ask your healthcare professional.

## 2018-03-08 NOTE — PROGRESS NOTES
[facility]  Neurology  [address] [place] [address]  Phone: [phone]   Fax: [phone]    SUBJECTIVE:     PATIENT ID:  Darnell Hubbard is a  RIGHT  HANDED 66 y.o. female. Neurologic Problem   The patient's primary symptoms include clumsiness, focal sensory loss, a loss of balance and memory loss. The patient's pertinent negatives include no altered mental status, focal weakness, near-syncope, slurred speech, syncope, visual change or weakness. This is a chronic problem. The neurological problem developed insidiously. The problem is unchanged. There was lower extremity and right-sided focality noted. Pertinent negatives include no abdominal pain, auditory change, aura, back pain, bladder incontinence, bowel incontinence, chest pain, confusion, diaphoresis, dizziness, fatigue, fever, headaches, light-headedness, nausea, neck pain, palpitations, shortness of breath, vertigo or vomiting. Past treatments include medication. The treatment provided mild relief. Her past medical history is significant for mood changes. There is no history of a bleeding disorder, a clotting disorder, a CVA, dementia, head trauma, liver disease or seizures. History obtained from  The patient   and other  available medical records were  Also  reviewed. The  Duration,  Quality,  Severity,  Location,  Timing,  Context,  Modifying  Factors   Of   The   Chief   Complaint   And  Present  Illness   Was   Reviewed   In   Chronological   Manner.                                         CURRENT PATIENT'S  MAIN  CONCERNS INCLUDE :                   1)  H/O    ANXIETY,   DEPRESSION   FOR  MORE  THAN   10  YEARS                          ON   PROZAC,  LAMICTAL,   SAPHRIS                        2)    BEING   FOLLOWED   BY    MENTAL  HEALTH  PROFESSIONALS                      3)   H/O    CHRONIC    ALCOHOL  USAGE     SINCE  TEENAGE                             H/O     CHRONIC    SMOKING      SINCE  TEENAGE MCV 99.8 06/27/2017    MCH 32.3 06/27/2017    MCHC 32.4 06/27/2017    RDW 15.1 06/27/2017     06/27/2017    MPV 9.5 06/27/2017     Chemistries  Lab Results   Component Value Date     12/14/2017    K 5.4 12/14/2017     12/14/2017    CO2 26 12/14/2017    BUN 13 12/14/2017    CREATININE 0.94 12/14/2017    CALCIUM 10.1 12/14/2017    PROT 7.4 12/14/2017    LABALBU 4.7 12/14/2017    BILITOT 0.18 12/14/2017    ALKPHOS 156 12/14/2017    AST 34 12/14/2017    ALT 44 12/14/2017     Lab Results   Component Value Date    ALKPHOS 156 12/14/2017    ALT 44 12/14/2017    AST 34 12/14/2017    PROT 7.4 12/14/2017    BILITOT 0.18 12/14/2017    LABALBU 4.7 12/14/2017     Lab Results   Component Value Date    BUN 13 12/14/2017    CREATININE 0.94 12/14/2017     Lab Results   Component Value Date    CALCIUM 10.1 12/14/2017     Lab Results   Component Value Date    AST 34 12/14/2017    ALT 44 12/14/2017       Lab Results   Component Value Date    URICACID 5.9 09/26/2014       Lab Results   Component Value Date    [id number] 008 11/15/2017       Review of Systems   Constitutional: Negative for activity change, appetite change, chills, diaphoresis, fatigue, fever and unexpected weight change. HENT: Negative for congestion, dental problem, drooling, ear discharge, ear pain, facial swelling, hearing loss, mouth sores, nosebleeds, postnasal drip, sinus pressure, sore throat, tinnitus, trouble swallowing and voice change. Eyes: Negative for photophobia, pain, discharge, redness, itching and visual disturbance. Respiratory: Negative for apnea, cough, choking, chest tightness, shortness of breath and wheezing. Cardiovascular: Negative for chest pain, palpitations, leg swelling and near-syncope. Gastrointestinal: Negative for abdominal distention, abdominal pain, blood in stool, bowel incontinence, constipation, diarrhea, nausea and vomiting.    Endocrine: Negative for cold intolerance, heat intolerance, polydipsia, inappropriate. She is not agitated, not aggressive, not hyperactive, not slowed, not withdrawn, not actively hallucinating and not combative. Thought content is not paranoid and not delusional. Cognition and memory are not impaired. She does not express impulsivity or inappropriate judgment. She exhibits a depressed mood. She expresses no homicidal and no suicidal ideation. She expresses no suicidal plans and no homicidal plans. She exhibits normal recent memory and normal remote memory. She is attentive. Neurologic Exam    NEUROLOGICAL EXAMINATION :    A) MENTAL STATUS:                   Alert and  oriented  To time, place  And  Person. No Aphasia. No  Dysarthria. Able   To  Follow three  Step commands without   Any  Difficulty. No right  To left confusion. Normal  Speech  And language function. Insight and  Judgment ,Fund  Of  Knowledge   within normal limits              Recent  And  Remote memory  within normal limits              Attention &Concentration are within normal limits                                                 B) CRANIAL NERVES :             2 CN : Visual  Acuity  And  Visual fields  within normal limits                      Fundi  Could  Not  Be  Could  Not  Be  Evaluated. 3,4,6 CN : Both  Pupils are   Reactive and  Equal.                          Extraocular   Movements  Are  Intact. No  Nystagmus. No  HARVINDER. No  Afferent  Pupillary  Defect noted. 5 CN :  Normal  Facial sensations and Corneal  Reflexes         7 CN :  Normal  Facial  Symmetry  And  Strength. No facial  Weakness.          8 CN :  Hearing  Appears within normal limits        9, 10 CN: Normal spontaneous, reflex palate movements       11 CN:   Normal  Shoulder shrug and  strength       12 CN :   Normal  Tongue movements and  Tongue  In midline                      No tongue   Fasciculations or atrophy ACTIVITIES    *   ADEQUATE   FLUID  INTAKE   AND  ELECTROLYTE  BALANCE         * KEEP  DAIRY  OF   THE  NEUROLOGICAL  SYMPTOMS      RECORDING THE    DURATION  AND  FREQUENCY. *  AVOID    CONDITIONS  AND  FACTORS   THAT  MAKE   NEUROLOGICAL  SYMPTOMS  WORSE. *  TO  MAINTAIN  REGULAR  SLEEP  WAKE  CYCLES. *   TO  HAVE  ADEQUATE  REST  AND   SLEEP    HOURS.      *    AVOID  ANY USAGE OF                 TOBACCO,  EXCESSIVE  ALCOHOL  AND   ILLEGAL   SUBSTANCES      *  CONTINUE MEDICATIONS PRESCRIBED BY NEUROLOGIST AS    RECOMMENDED     *   Compliance   With  Medications   And  Instructions      *    To  Continue  The    Antiplatelet  therapy    As   Recommended  Was   Discussed      *    Prophylactic  Use   Of     Vitamin   B   Complex,  Folic  Acid,    Vitamin  B12    Multivitamin,   Calcium  With  magnesium  And  Vit D    Supplementations   Over  The  Counter  Discussed         *  FOOT  CARE, DAILY  INSPECTION  OF  FEET   AND       PERIODIC  PODIATRY EVALUATIONS . Controlled Substances Monitoring: Attestation: The Prescription Monitoring Report for this patient was reviewed today. David Travis MD)            Orders Placed This Encounter   Procedures    XR CHEST STANDARD (2 VW)    Lyme Ab    TSH without Reflex    Vitamin B12 & Folate    Urine Drug Screen    Heavy Metals Screen, Urine    EMG               *PATIENT   TO  FOLLOW  UP  WITH   PRIMARY  CARE   AND   OTHER  CONSULTANTS  AS  BEFORE.           *TO  FOLLOW  WITH   MENTAL  HEALTH  PROFESSIONALS ,  INCLUDING          PSYCHOLOGICAL  COUNSELING   AND  PSYCHIATRIC  EVALUTIONS        *  Maintain   Healthy  Life Style    With   Periodic  Monitoring  Of    Any  Medical  Conditions  Including   Elevated  Blood  Pressure,  Lipid  Profile,   Blood  Sugar levels  And   Heart  Disease.             *   Period   Screening  For  Cancers  Involving  Breast,  Colon,  Prostate, lungs  And  Other  Organs  As  Applicable,  In consultation   With Your  Primary Care Providers. * Second  Neurological  Opinion  And  Evaluations  In  [facility]  Setting  If  Patient  Is  Interested. *  If  The  Patient remains  Neurologically  Stable   Return   To  [facility] Neurology Department   IN  1-2  MONTHS  TIME   FOR  FURTHER  FOLLOW UP.                 *  If   There is  Any  Significant  Worsening   Of  Current  Symptoms  And  Or  If patient  Develops   Any additional  New  Neurological  Symptoms  Or  Significant  Concerns   Should  Call  911 or  Go  To  Emergency  Department  For  Further  Immediate  Evaluation. *   The  Neurological   Findings,  Possible  Diagnosis,  Differential diagnoses   And  Options  For    Further   Investigations   And  management   Are  Discussed  Comprehensively. Medications   And  Prescription   Risks  And  Side effects  Are   Also  Discussed. The  Above  Were  Reviewed  With  patient and   questions  Answered  In  Detail. More   Than   50% of face  To face Time   Was  Spent  On  Counseling   And   Coordination  Of  Care   Of   Patient's multiple   Neurological  Problems   And   Comorbid  Medical   Conditions. Electronically signed by Mila Storey MD   Board Certified in  Neurology &  In  [facility] [employer] and [employer] ([employer])      DISCLAIMER:   Although every effort was made to ensure the accuracy of this  electronic transcription, some errors in transcription may have occurred. GENERAL PATIENT INSTRUCTIONS:     A Healthy Lifestyle: Care Instructions  Your Care Instructions  A healthy lifestyle can help you feel good, stay at a healthy weight, and have plenty of energy for both work and play. A healthy lifestyle is something you can share with your whole family. A healthy lifestyle also can lower your risk for serious health problems, such as high blood pressure, heart disease, and diabetes.   You

## 2018-03-09 LAB — LYME ANTIBODY: 0.17

## 2018-03-14 ENCOUNTER — HOSPITAL ENCOUNTER (OUTPATIENT)
Dept: GENERAL RADIOLOGY | Age: 58
Discharge: HOME | End: 2018-03-16
Payer: COMMERCIAL

## 2018-03-14 ENCOUNTER — TELEPHONE (OUTPATIENT)
Dept: FAMILY MEDICINE CLINIC | Age: 58
End: 2018-03-14

## 2018-03-14 ENCOUNTER — HOSPITAL ENCOUNTER (OUTPATIENT)
Dept: LAB | Age: 58
Discharge: HOME | End: 2018-03-14
Payer: COMMERCIAL

## 2018-03-14 DIAGNOSIS — M79.605 BILATERAL LEG PAIN: ICD-10-CM

## 2018-03-14 DIAGNOSIS — G62.9 NEUROPATHY: ICD-10-CM

## 2018-03-14 DIAGNOSIS — M79.604 BILATERAL LEG PAIN: ICD-10-CM

## 2018-03-14 DIAGNOSIS — E11.9 TYPE 2 DIABETES MELLITUS WITHOUT COMPLICATION, UNSPECIFIED LONG TERM INSULIN USE STATUS: ICD-10-CM

## 2018-03-14 DIAGNOSIS — G63 POLYNEUROPATHY ASSOCIATED WITH UNDERLYING DISEASE (HCC): Primary | ICD-10-CM

## 2018-03-14 LAB
AMPHETAMINE SCREEN URINE: NEGATIVE
BARBITURATE SCREEN URINE: NEGATIVE
BENZODIAZEPINE SCREEN, URINE: POSITIVE
BUPRENORPHINE URINE: NEGATIVE
CANNABINOID SCREEN URINE: POSITIVE
COCAINE METABOLITE, URINE: NEGATIVE
MDMA URINE: ABNORMAL
METHADONE SCREEN, URINE: POSITIVE
METHAMPHETAMINE, URINE: POSITIVE
OPIATES, URINE: NEGATIVE
OXYCODONE SCREEN URINE: POSITIVE
PHENCYCLIDINE, URINE: NEGATIVE
PROPOXYPHENE, URINE: NEGATIVE
TEST INFORMATION: ABNORMAL
TRICYCLIC ANTIDEPRESSANTS, UR: NEGATIVE

## 2018-03-14 PROCEDURE — 84630 ASSAY OF ZINC: CPT

## 2018-03-14 PROCEDURE — 82175 ASSAY OF ARSENIC: CPT

## 2018-03-14 PROCEDURE — 82525 ASSAY OF COPPER: CPT

## 2018-03-14 PROCEDURE — 82300 ASSAY OF CADMIUM: CPT

## 2018-03-14 PROCEDURE — 80306 DRUG TEST PRSMV INSTRMNT: CPT

## 2018-03-14 PROCEDURE — 71046 X-RAY EXAM CHEST 2 VIEWS: CPT

## 2018-03-14 PROCEDURE — 83655 ASSAY OF LEAD: CPT

## 2018-03-14 PROCEDURE — 83825 ASSAY OF MERCURY: CPT

## 2018-03-14 RX ORDER — DULOXETIN HYDROCHLORIDE 60 MG/1
60 CAPSULE, DELAYED RELEASE ORAL DAILY
Qty: 30 CAPSULE | Refills: 3 | Status: CANCELLED | OUTPATIENT
Start: 2018-03-14

## 2018-03-19 LAB
ARSENIC, URINE /24 HR: NORMAL UG/D (ref 0–49.9)
ARSENIC, URINE /VOLUME: 15.3 UG/L (ref 0–34.9)
ARSENIC, URINE RATIO CREATININE: 15.3 UG/G CRT (ref 0–29.9)
CADMIUM, URINE /24 HR: NORMAL UG/D (ref 0–3.2)
CADMIUM, URINE /VOLUME: <1 UG/L (ref 0–1)
CADMIUM, URINE RATIO CREATININE: NORMAL UG/G CRT (ref 0–3.2)
COPPER, URINE /24 HR: NORMAL UG/D (ref 3–45)
COPPER, URINE /VOLUME: 1.4 UG/DL (ref 0.3–3.2)
COPPER, URINE RATIO CREATININE: 14 UG/G CRT (ref 10–45)
CREATININE URINE /24 HR: NORMAL MG/D (ref 500–1400)
CREATININE URINE /VOLUME: 100 MG/DL
HOURS COLLECTED: NORMAL
LEAD, URINE /24 HR: NORMAL UG/D (ref 0–8.1)
LEAD, URINE /VOLUME: <5 UG/L (ref 0–1.4)
LEAD, URINE RATIO CREATININE: NORMAL UG/G CRT (ref 0–1.4)
MERCURY, URINE /24 HR: NORMAL UG/D (ref 0–2.9)
MERCURY, URINE /G CRT: NORMAL UG/G CRT (ref 0–20)
MERCURY, URINE /VOLUME: <2.5 UG/L (ref 0–1.9)
URINE VOLUME: NORMAL
ZINC, URINE /24 HR: NORMAL UG/D (ref 150–1200)
ZINC, URINE /VOLUME: 52.8 UG/DL (ref 15–120)
ZINC, URINE RATIO CREATININE: 528 UG/G CRT (ref 110–750)

## 2018-03-26 DIAGNOSIS — G63 POLYNEUROPATHY ASSOCIATED WITH UNDERLYING DISEASE (HCC): Primary | ICD-10-CM

## 2018-03-26 RX ORDER — DULOXETIN HYDROCHLORIDE 30 MG/1
30 CAPSULE, DELAYED RELEASE ORAL DAILY
Qty: 30 CAPSULE | Refills: 2 | Status: CANCELLED | OUTPATIENT
Start: 2018-03-26

## 2018-03-26 RX ORDER — DULOXETIN HYDROCHLORIDE 30 MG/1
30 CAPSULE, DELAYED RELEASE ORAL DAILY
Qty: 30 CAPSULE | Refills: 2 | Status: SHIPPED | OUTPATIENT
Start: 2018-03-26 | End: 2018-04-02 | Stop reason: DRUGHIGH

## 2018-03-28 RX ORDER — DULOXETIN HYDROCHLORIDE 60 MG/1
60 CAPSULE, DELAYED RELEASE ORAL DAILY
Qty: 30 CAPSULE | Refills: 6 | Status: SHIPPED | OUTPATIENT
Start: 2018-03-28 | End: 2018-06-14 | Stop reason: SDUPTHER

## 2018-03-29 ENCOUNTER — OFFICE VISIT (OUTPATIENT)
Dept: VASCULAR SURGERY | Age: 58
End: 2018-03-29
Payer: COMMERCIAL

## 2018-03-29 VITALS
SYSTOLIC BLOOD PRESSURE: 130 MMHG | HEIGHT: 66 IN | DIASTOLIC BLOOD PRESSURE: 80 MMHG | BODY MASS INDEX: 29.41 KG/M2 | HEART RATE: 68 BPM | WEIGHT: 183 LBS

## 2018-03-29 DIAGNOSIS — I73.9 PERIPHERAL ARTERIAL DISEASE (HCC): ICD-10-CM

## 2018-03-29 DIAGNOSIS — G63 POLYNEUROPATHY ASSOCIATED WITH UNDERLYING DISEASE (HCC): Primary | ICD-10-CM

## 2018-03-29 PROCEDURE — 99202 OFFICE O/P NEW SF 15 MIN: CPT | Performed by: SURGERY

## 2018-03-29 NOTE — PROGRESS NOTES
5    metFORMIN (GLUCOPHAGE) 500 MG tablet, Take 1 tablet by mouth 2 times daily, Disp: 180 tablet, Rfl: 1    atorvastatin (LIPITOR) 10 MG tablet, Take 1 tablet by mouth daily, Disp: 90 tablet, Rfl: 1    lisinopril (PRINIVIL;ZESTRIL) 40 MG tablet, Take 1 tablet by mouth daily, Disp: 90 tablet, Rfl: 1    Glucose Blood (BLOOD GLUCOSE TEST STRIPS) STRP, Diagnosis 250.90, tests once daily, Disp: 50 strip, Rfl: 11    Lancets MISC, Diagnosis 250.90, tests once daily, Disp: 50 each, Rfl: 11    FLUoxetine (PROZAC) 20 MG capsule, Take 60 mg by mouth daily , Disp: , Rfl:     lamoTRIgine (LAMICTAL) 200 MG tablet, Take 200 mg by mouth daily, Disp: , Rfl:     modafinil (PROVIGIL) 200 MG tablet, Take 200 mg by mouth 2 times daily, Disp: , Rfl:     Blood Glucose Monitoring Suppl [facility], Diagnosis 250.90, tests once daily, Disp: 1 Device, Rfl: 0    Cholecalciferol (VITAMIN D PO), Take by mouth, Disp: , Rfl:     SAPHRIS 10 MG SUBL sublingual tablet, , Disp: , Rfl:     Multiple Vitamins-Minerals (MULTIVITAMIN PO), Take  by mouth daily. , Disp: , Rfl:     omeprazole (PRILOSEC) 20 MG capsule, Take 20 mg by mouth daily. , Disp: , Rfl:     gabapentin (NEURONTIN) 300 MG capsule, Take 3 capsules three times a day., Disp: 270 capsule, Rfl: 1    Vital Signs:  Vitals:    03/29/18 0847   BP: 130/80   Pulse: 68       Physical Exam:  General appearance - alert, well appearing and in no acute distress  Mental status - oriented to person, place and time with normal affect  Head - normocephalic and atraumatic  Eyes - pupils equal and reactive, extraocular eye movements intact, conjunctiva clear  Ears - hearing appears to be intact  Nose - no drainage noted  Mouth - mucous membranes moist  Neck - supple, no carotid bruits, thyroid not palpable, no JVD  Chest - clear to auscultation, normal effort  Heart - normal rate, regular rhythm, no murmurs  Abdomen - soft, non-tender, non-distended, bowel sounds present all four quadrants, no masses,

## 2018-04-02 ENCOUNTER — HOSPITAL ENCOUNTER (EMERGENCY)
Age: 58
Discharge: HOME | End: 2018-04-02
Attending: EMERGENCY MEDICINE
Payer: COMMERCIAL

## 2018-04-02 ENCOUNTER — OFFICE VISIT (OUTPATIENT)
Dept: PRIMARY CARE CLINIC | Age: 58
End: 2018-04-02
Payer: COMMERCIAL

## 2018-04-02 VITALS
SYSTOLIC BLOOD PRESSURE: 128 MMHG | DIASTOLIC BLOOD PRESSURE: 69 MMHG | OXYGEN SATURATION: 98 % | WEIGHT: 175 LBS | HEART RATE: 85 BPM | TEMPERATURE: 96.4 F | BODY MASS INDEX: 28.25 KG/M2 | RESPIRATION RATE: 12 BRPM

## 2018-04-02 VITALS
SYSTOLIC BLOOD PRESSURE: 118 MMHG | DIASTOLIC BLOOD PRESSURE: 72 MMHG | BODY MASS INDEX: 28.25 KG/M2 | HEART RATE: 100 BPM | RESPIRATION RATE: 16 BRPM | TEMPERATURE: 97.1 F | WEIGHT: 175.8 LBS | OXYGEN SATURATION: 97 % | HEIGHT: 66 IN

## 2018-04-02 DIAGNOSIS — E86.0 DEHYDRATION: Primary | ICD-10-CM

## 2018-04-02 DIAGNOSIS — I48.92 ATRIAL FLUTTER, UNSPECIFIED TYPE (HCC): Primary | ICD-10-CM

## 2018-04-02 DIAGNOSIS — R42 EPISODIC LIGHTHEADEDNESS: ICD-10-CM

## 2018-04-02 DIAGNOSIS — N30.00 ACUTE CYSTITIS WITHOUT HEMATURIA: ICD-10-CM

## 2018-04-02 DIAGNOSIS — R11.0 NAUSEA: ICD-10-CM

## 2018-04-02 DIAGNOSIS — R07.89 CHEST HEAVINESS: ICD-10-CM

## 2018-04-02 LAB
-: ABNORMAL
ABSOLUTE EOS #: 0.2 K/UL (ref 0–0.4)
ABSOLUTE IMMATURE GRANULOCYTE: ABNORMAL K/UL (ref 0–0.3)
ABSOLUTE LYMPH #: 1.9 K/UL (ref 1–4.8)
ABSOLUTE MONO #: 0.9 K/UL (ref 0.1–1.2)
AMORPHOUS: ABNORMAL
ANION GAP SERPL CALCULATED.3IONS-SCNC: 16 MMOL/L (ref 9–17)
BACTERIA: ABNORMAL
BASOPHILS # BLD: 1 % (ref 0–1)
BASOPHILS ABSOLUTE: 0.1 K/UL (ref 0–0.2)
BILIRUBIN URINE: NEGATIVE
BUN BLDV-MCNC: 36 MG/DL (ref 6–20)
BUN/CREAT BLD: 18 (ref 9–20)
CALCIUM SERPL-MCNC: 11.6 MG/DL (ref 8.6–10.4)
CASTS UA: ABNORMAL /LPF (ref 0–2)
CHLORIDE BLD-SCNC: 99 MMOL/L (ref 98–107)
CO2: 28 MMOL/L (ref 20–31)
COLOR: ABNORMAL
COMMENT UA: ABNORMAL
CREAT SERPL-MCNC: 2.05 MG/DL (ref 0.5–0.9)
CRYSTALS, UA: ABNORMAL /HPF
D DIMER: <100 NG/ML
DIFFERENTIAL TYPE: ABNORMAL
EKG ATRIAL RATE: 97 BPM
EKG P AXIS: 63 DEGREES
EKG P-R INTERVAL: 134 MS
EKG Q-T INTERVAL: 362 MS
EKG QRS DURATION: 80 MS
EKG QTC CALCULATION (BAZETT): 459 MS
EKG R AXIS: 67 DEGREES
EKG T AXIS: 59 DEGREES
EKG VENTRICULAR RATE: 97 BPM
EOSINOPHILS RELATIVE PERCENT: 2 % (ref 1–7)
EPITHELIAL CELLS UA: ABNORMAL /HPF (ref 0–5)
GFR AFRICAN AMERICAN: 30 ML/MIN
GFR NON-AFRICAN AMERICAN: 25 ML/MIN
GFR SERPL CREATININE-BSD FRML MDRD: ABNORMAL ML/MIN/{1.73_M2}
GFR SERPL CREATININE-BSD FRML MDRD: ABNORMAL ML/MIN/{1.73_M2}
GLUCOSE BLD-MCNC: 108 MG/DL (ref 70–99)
GLUCOSE URINE: NEGATIVE
HCT VFR BLD CALC: 42.7 % (ref 36–46)
HEMOGLOBIN: 14.2 G/DL (ref 12–16)
IMMATURE GRANULOCYTES: ABNORMAL %
INR BLD: 1
KETONES, URINE: ABNORMAL
LEUKOCYTE ESTERASE, URINE: ABNORMAL
LYMPHOCYTES # BLD: 15 % (ref 16–46)
MCH RBC QN AUTO: 32.7 PG (ref 26–34)
MCHC RBC AUTO-ENTMCNC: 33.3 G/DL (ref 31–37)
MCV RBC AUTO: 98.1 FL (ref 80–100)
MONOCYTES # BLD: 7 % (ref 4–11)
MUCUS: ABNORMAL
NITRITE, URINE: NEGATIVE
NRBC AUTOMATED: ABNORMAL PER 100 WBC
OTHER OBSERVATIONS UA: ABNORMAL
PARTIAL THROMBOPLASTIN TIME: 23.6 SEC (ref 27–35)
PDW BLD-RTO: 12.9 % (ref 11–14.5)
PH UA: 6 (ref 5–6)
PLATELET # BLD: 352 K/UL (ref 140–450)
PLATELET ESTIMATE: ABNORMAL
PMV BLD AUTO: 9.1 FL (ref 6–12)
POTASSIUM SERPL-SCNC: 4.5 MMOL/L (ref 3.7–5.3)
PROTEIN UA: ABNORMAL
PROTHROMBIN TIME: 10.4 SEC (ref 9.4–11.3)
RBC # BLD: 4.35 M/UL (ref 4–5.2)
RBC # BLD: ABNORMAL 10*6/UL
RBC UA: ABNORMAL /HPF (ref 0–4)
RENAL EPITHELIAL, UA: ABNORMAL /HPF
SEG NEUTROPHILS: 75 % (ref 43–77)
SEGMENTED NEUTROPHILS ABSOLUTE COUNT: 9.5 K/UL (ref 1.8–7.7)
SODIUM BLD-SCNC: 143 MMOL/L (ref 135–144)
SPECIFIC GRAVITY UA: 1.02 (ref 1.01–1.02)
TRICHOMONAS: ABNORMAL
TROPONIN INTERP: NORMAL
TROPONIN T: <0.03 NG/ML
TURBIDITY: ABNORMAL
URINE HGB: NEGATIVE
UROBILINOGEN, URINE: NORMAL
WBC # BLD: 12.5 K/UL (ref 3.5–11)
WBC # BLD: ABNORMAL 10*3/UL
WBC UA: >100 /HPF (ref 0–4)
YEAST: ABNORMAL

## 2018-04-02 PROCEDURE — 93005 ELECTROCARDIOGRAM TRACING: CPT

## 2018-04-02 PROCEDURE — 96361 HYDRATE IV INFUSION ADD-ON: CPT

## 2018-04-02 PROCEDURE — 81001 URINALYSIS AUTO W/SCOPE: CPT

## 2018-04-02 PROCEDURE — 2580000003 HC RX 258: Performed by: EMERGENCY MEDICINE

## 2018-04-02 PROCEDURE — 96360 HYDRATION IV INFUSION INIT: CPT

## 2018-04-02 PROCEDURE — 80048 BASIC METABOLIC PNL TOTAL CA: CPT

## 2018-04-02 PROCEDURE — 84484 ASSAY OF TROPONIN QUANT: CPT

## 2018-04-02 PROCEDURE — 93000 ELECTROCARDIOGRAM COMPLETE: CPT | Performed by: NURSE PRACTITIONER

## 2018-04-02 PROCEDURE — 36415 COLL VENOUS BLD VENIPUNCTURE: CPT

## 2018-04-02 PROCEDURE — 85730 THROMBOPLASTIN TIME PARTIAL: CPT

## 2018-04-02 PROCEDURE — 85025 COMPLETE CBC W/AUTO DIFF WBC: CPT

## 2018-04-02 PROCEDURE — 99214 OFFICE O/P EST MOD 30 MIN: CPT | Performed by: NURSE PRACTITIONER

## 2018-04-02 PROCEDURE — 99284 EMERGENCY DEPT VISIT MOD MDM: CPT

## 2018-04-02 PROCEDURE — 85610 PROTHROMBIN TIME: CPT

## 2018-04-02 PROCEDURE — 85379 FIBRIN DEGRADATION QUANT: CPT

## 2018-04-02 RX ORDER — PEN NEEDLE, DIABETIC 31 GX5/16"
NEEDLE, DISPOSABLE MISCELLANEOUS
COMMUNITY
Start: 2018-02-09 | End: 2019-01-11 | Stop reason: SDUPTHER

## 2018-04-02 RX ORDER — 0.9 % SODIUM CHLORIDE 0.9 %
1000 INTRAVENOUS SOLUTION INTRAVENOUS ONCE
Status: COMPLETED | OUTPATIENT
Start: 2018-04-02 | End: 2018-04-02

## 2018-04-02 RX ORDER — CEPHALEXIN 500 MG/1
500 CAPSULE ORAL 3 TIMES DAILY
Qty: 30 CAPSULE | Refills: 0 | Status: SHIPPED | OUTPATIENT
Start: 2018-04-02 | End: 2018-04-12

## 2018-04-02 RX ADMIN — SODIUM CHLORIDE 1000 ML: 9 INJECTION, SOLUTION INTRAVENOUS at 12:32

## 2018-04-02 RX ADMIN — SODIUM CHLORIDE 1000 ML: 9 INJECTION, SOLUTION INTRAVENOUS at 14:22

## 2018-04-02 ASSESSMENT — ENCOUNTER SYMPTOMS
COUGH: 0
SORE THROAT: 0
NAUSEA: 1

## 2018-04-11 ENCOUNTER — HOSPITAL ENCOUNTER (EMERGENCY)
Age: 58
Discharge: HOME | End: 2018-04-11
Attending: EMERGENCY MEDICINE
Payer: COMMERCIAL

## 2018-04-11 ENCOUNTER — APPOINTMENT (OUTPATIENT)
Dept: GENERAL RADIOLOGY | Age: 58
End: 2018-04-11
Payer: COMMERCIAL

## 2018-04-11 VITALS
TEMPERATURE: 100.8 F | WEIGHT: 180 LBS | DIASTOLIC BLOOD PRESSURE: 92 MMHG | RESPIRATION RATE: 18 BRPM | HEIGHT: 67 IN | BODY MASS INDEX: 28.25 KG/M2 | OXYGEN SATURATION: 98 % | SYSTOLIC BLOOD PRESSURE: 138 MMHG | HEART RATE: 94 BPM

## 2018-04-11 DIAGNOSIS — R42 DIZZINESS: Primary | ICD-10-CM

## 2018-04-11 LAB
-: ABNORMAL
ABSOLUTE EOS #: 0.4 K/UL (ref 0–0.4)
ABSOLUTE IMMATURE GRANULOCYTE: ABNORMAL K/UL (ref 0–0.3)
ABSOLUTE LYMPH #: 1.7 K/UL (ref 1–4.8)
ABSOLUTE MONO #: 0.7 K/UL (ref 0.1–1.2)
AMORPHOUS: ABNORMAL
ANION GAP SERPL CALCULATED.3IONS-SCNC: 15 MMOL/L (ref 9–17)
BACTERIA: ABNORMAL
BASOPHILS # BLD: 1 % (ref 0–1)
BASOPHILS ABSOLUTE: 0.1 K/UL (ref 0–0.2)
BILIRUBIN URINE: NEGATIVE
BUN BLDV-MCNC: 24 MG/DL (ref 6–20)
BUN/CREAT BLD: 15 (ref 9–20)
CALCIUM SERPL-MCNC: 10.5 MG/DL (ref 8.6–10.4)
CASTS UA: ABNORMAL /LPF (ref 0–2)
CHLORIDE BLD-SCNC: 100 MMOL/L (ref 98–107)
CO2: 27 MMOL/L (ref 20–31)
COLOR: ABNORMAL
COMMENT UA: ABNORMAL
CREAT SERPL-MCNC: 1.62 MG/DL (ref 0.5–0.9)
CRYSTALS, UA: ABNORMAL /HPF
DIFFERENTIAL TYPE: ABNORMAL
EKG ATRIAL RATE: 98 BPM
EKG P AXIS: 47 DEGREES
EKG P-R INTERVAL: 132 MS
EKG Q-T INTERVAL: 368 MS
EKG QRS DURATION: 86 MS
EKG QTC CALCULATION (BAZETT): 469 MS
EKG R AXIS: 60 DEGREES
EKG T AXIS: 60 DEGREES
EKG VENTRICULAR RATE: 98 BPM
EOSINOPHILS RELATIVE PERCENT: 3 % (ref 1–7)
EPITHELIAL CELLS UA: ABNORMAL /HPF (ref 0–5)
GFR AFRICAN AMERICAN: 40 ML/MIN
GFR NON-AFRICAN AMERICAN: 33 ML/MIN
GFR SERPL CREATININE-BSD FRML MDRD: ABNORMAL ML/MIN/{1.73_M2}
GFR SERPL CREATININE-BSD FRML MDRD: ABNORMAL ML/MIN/{1.73_M2}
GLUCOSE BLD-MCNC: 107 MG/DL (ref 65–105)
GLUCOSE BLD-MCNC: 131 MG/DL (ref 70–99)
GLUCOSE URINE: NEGATIVE
HCT VFR BLD CALC: 40.7 % (ref 36–46)
HEMOGLOBIN: 13.9 G/DL (ref 12–16)
IMMATURE GRANULOCYTES: ABNORMAL %
INR BLD: 1
KETONES, URINE: NEGATIVE
LACTIC ACID: 1.3 MMOL/L (ref 0.5–2.2)
LEUKOCYTE ESTERASE, URINE: NEGATIVE
LYMPHOCYTES # BLD: 16 % (ref 16–46)
MCH RBC QN AUTO: 32.6 PG (ref 26–34)
MCHC RBC AUTO-ENTMCNC: 34.1 G/DL (ref 31–37)
MCV RBC AUTO: 95.7 FL (ref 80–100)
MONOCYTES # BLD: 7 % (ref 4–11)
MUCUS: ABNORMAL
NITRITE, URINE: NEGATIVE
NRBC AUTOMATED: ABNORMAL PER 100 WBC
OTHER OBSERVATIONS UA: ABNORMAL
PDW BLD-RTO: 12.6 % (ref 11–14.5)
PH UA: 7 (ref 5–6)
PLATELET # BLD: 353 K/UL (ref 140–450)
PLATELET ESTIMATE: ABNORMAL
PMV BLD AUTO: 8.8 FL (ref 6–12)
POC TROPONIN I: <0.02 NG/ML (ref 0–0.08)
POC TROPONIN INTERP: NORMAL
POTASSIUM SERPL-SCNC: 4.1 MMOL/L (ref 3.7–5.3)
PROTEIN UA: NEGATIVE
PROTHROMBIN TIME: 10.1 SEC (ref 9.4–11.3)
RBC # BLD: 4.25 M/UL (ref 4–5.2)
RBC # BLD: ABNORMAL 10*6/UL
RBC UA: ABNORMAL /HPF (ref 0–4)
RENAL EPITHELIAL, UA: ABNORMAL /HPF
SEG NEUTROPHILS: 73 % (ref 43–77)
SEGMENTED NEUTROPHILS ABSOLUTE COUNT: 8 K/UL (ref 1.8–7.7)
SODIUM BLD-SCNC: 142 MMOL/L (ref 135–144)
SPECIFIC GRAVITY UA: 1.02 (ref 1.01–1.02)
TRICHOMONAS: ABNORMAL
TURBIDITY: ABNORMAL
URINE HGB: NEGATIVE
UROBILINOGEN, URINE: NORMAL
WBC # BLD: 10.9 K/UL (ref 3.5–11)
WBC # BLD: ABNORMAL 10*3/UL
WBC UA: ABNORMAL /HPF (ref 0–4)
YEAST: ABNORMAL

## 2018-04-11 PROCEDURE — 99284 EMERGENCY DEPT VISIT MOD MDM: CPT

## 2018-04-11 PROCEDURE — 71046 X-RAY EXAM CHEST 2 VIEWS: CPT

## 2018-04-11 PROCEDURE — 84484 ASSAY OF TROPONIN QUANT: CPT

## 2018-04-11 PROCEDURE — 93005 ELECTROCARDIOGRAM TRACING: CPT

## 2018-04-11 PROCEDURE — 36415 COLL VENOUS BLD VENIPUNCTURE: CPT

## 2018-04-11 PROCEDURE — 85610 PROTHROMBIN TIME: CPT

## 2018-04-11 PROCEDURE — 87040 BLOOD CULTURE FOR BACTERIA: CPT

## 2018-04-11 PROCEDURE — 87086 URINE CULTURE/COLONY COUNT: CPT

## 2018-04-11 PROCEDURE — 2580000003 HC RX 258: Performed by: EMERGENCY MEDICINE

## 2018-04-11 PROCEDURE — 81001 URINALYSIS AUTO W/SCOPE: CPT

## 2018-04-11 PROCEDURE — 83605 ASSAY OF LACTIC ACID: CPT

## 2018-04-11 PROCEDURE — 85025 COMPLETE CBC W/AUTO DIFF WBC: CPT

## 2018-04-11 PROCEDURE — 96374 THER/PROPH/DIAG INJ IV PUSH: CPT

## 2018-04-11 PROCEDURE — 6360000002 HC RX W HCPCS: Performed by: EMERGENCY MEDICINE

## 2018-04-11 PROCEDURE — 80048 BASIC METABOLIC PNL TOTAL CA: CPT

## 2018-04-11 PROCEDURE — 82947 ASSAY GLUCOSE BLOOD QUANT: CPT

## 2018-04-11 RX ORDER — ONDANSETRON 4 MG/1
4 TABLET, ORALLY DISINTEGRATING ORAL EVERY 8 HOURS PRN
Qty: 20 TABLET | Refills: 0 | Status: SHIPPED | OUTPATIENT
Start: 2018-04-11 | End: 2020-08-13

## 2018-04-11 RX ORDER — ONDANSETRON 2 MG/ML
4 INJECTION INTRAMUSCULAR; INTRAVENOUS ONCE
Status: COMPLETED | OUTPATIENT
Start: 2018-04-11 | End: 2018-04-11

## 2018-04-11 RX ORDER — 0.9 % SODIUM CHLORIDE 0.9 %
1000 INTRAVENOUS SOLUTION INTRAVENOUS ONCE
Status: COMPLETED | OUTPATIENT
Start: 2018-04-11 | End: 2018-04-11

## 2018-04-11 RX ADMIN — ONDANSETRON 4 MG: 2 INJECTION INTRAMUSCULAR; INTRAVENOUS at 14:55

## 2018-04-11 RX ADMIN — SODIUM CHLORIDE 1000 ML: 9 INJECTION, SOLUTION INTRAVENOUS at 14:52

## 2018-04-11 ASSESSMENT — ENCOUNTER SYMPTOMS
NAUSEA: 1
ABDOMINAL PAIN: 0
EYE PAIN: 0
DIARRHEA: 0
VOMITING: 0
CONSTIPATION: 0
BLOOD IN STOOL: 0
SHORTNESS OF BREATH: 0
BACK PAIN: 0
COUGH: 0

## 2018-04-13 ENCOUNTER — OFFICE VISIT (OUTPATIENT)
Dept: FAMILY MEDICINE CLINIC | Age: 58
End: 2018-04-13
Payer: COMMERCIAL

## 2018-04-13 VITALS
WEIGHT: 177.2 LBS | OXYGEN SATURATION: 98 % | HEIGHT: 67 IN | BODY MASS INDEX: 27.81 KG/M2 | SYSTOLIC BLOOD PRESSURE: 100 MMHG | HEART RATE: 118 BPM | DIASTOLIC BLOOD PRESSURE: 60 MMHG | TEMPERATURE: 99 F

## 2018-04-13 DIAGNOSIS — F19.10 MULTIPLE SUBSTANCE ABUSE (HCC): ICD-10-CM

## 2018-04-13 DIAGNOSIS — R00.0 TACHYCARDIA: ICD-10-CM

## 2018-04-13 DIAGNOSIS — R42 DIZZINESS: Primary | ICD-10-CM

## 2018-04-13 LAB
CULTURE: NO GROWTH
CULTURE: NORMAL
Lab: NORMAL
SPECIMEN DESCRIPTION: NORMAL
SPECIMEN DESCRIPTION: NORMAL
STATUS: NORMAL

## 2018-04-13 PROCEDURE — 99214 OFFICE O/P EST MOD 30 MIN: CPT | Performed by: NURSE PRACTITIONER

## 2018-04-15 DIAGNOSIS — E78.2 MIXED HYPERLIPIDEMIA: ICD-10-CM

## 2018-04-15 ASSESSMENT — ENCOUNTER SYMPTOMS
WHEEZING: 0
COUGH: 0
SHORTNESS OF BREATH: 0

## 2018-04-16 RX ORDER — ATORVASTATIN CALCIUM 10 MG/1
TABLET, FILM COATED ORAL
Qty: 90 TABLET | Refills: 1 | Status: SHIPPED | OUTPATIENT
Start: 2018-04-16 | End: 2018-09-21 | Stop reason: SDUPTHER

## 2018-04-17 LAB
CULTURE: NORMAL
Lab: NORMAL
Lab: NORMAL
SPECIMEN DESCRIPTION: NORMAL
STATUS: NORMAL
STATUS: NORMAL

## 2018-04-18 ENCOUNTER — HOSPITAL ENCOUNTER (OUTPATIENT)
Age: 58
Setting detail: SPECIMEN
Discharge: HOME | End: 2018-04-18
Payer: COMMERCIAL

## 2018-04-18 ENCOUNTER — TELEPHONE (OUTPATIENT)
Dept: FAMILY MEDICINE CLINIC | Age: 58
End: 2018-04-18

## 2018-04-18 ENCOUNTER — OFFICE VISIT (OUTPATIENT)
Dept: FAMILY MEDICINE CLINIC | Age: 58
End: 2018-04-18
Payer: COMMERCIAL

## 2018-04-18 VITALS
WEIGHT: 177 LBS | HEART RATE: 88 BPM | DIASTOLIC BLOOD PRESSURE: 58 MMHG | SYSTOLIC BLOOD PRESSURE: 98 MMHG | BODY MASS INDEX: 27.78 KG/M2 | HEIGHT: 67 IN | TEMPERATURE: 97.6 F

## 2018-04-18 DIAGNOSIS — K21.9 GASTROESOPHAGEAL REFLUX DISEASE WITHOUT ESOPHAGITIS: ICD-10-CM

## 2018-04-18 DIAGNOSIS — R31.9 URINARY TRACT INFECTION WITH HEMATURIA, SITE UNSPECIFIED: Primary | ICD-10-CM

## 2018-04-18 DIAGNOSIS — G63 POLYNEUROPATHY ASSOCIATED WITH UNDERLYING DISEASE (HCC): ICD-10-CM

## 2018-04-18 DIAGNOSIS — R31.9 URINARY TRACT INFECTION WITH HEMATURIA, SITE UNSPECIFIED: ICD-10-CM

## 2018-04-18 DIAGNOSIS — N39.0 URINARY TRACT INFECTION WITH HEMATURIA, SITE UNSPECIFIED: ICD-10-CM

## 2018-04-18 DIAGNOSIS — F10.10 ALCOHOL ABUSE: ICD-10-CM

## 2018-04-18 DIAGNOSIS — N39.0 URINARY TRACT INFECTION WITH HEMATURIA, SITE UNSPECIFIED: Primary | ICD-10-CM

## 2018-04-18 LAB
-: ABNORMAL
AMORPHOUS: ABNORMAL
BACTERIA: ABNORMAL
BILIRUBIN URINE: NEGATIVE
CASTS UA: ABNORMAL /LPF (ref 0–2)
COLOR: ABNORMAL
COMMENT UA: ABNORMAL
CRYSTALS, UA: ABNORMAL /HPF
EPITHELIAL CELLS UA: ABNORMAL /HPF (ref 0–5)
GLUCOSE URINE: NEGATIVE
KETONES, URINE: NEGATIVE
LEUKOCYTE ESTERASE, URINE: ABNORMAL
MUCUS: ABNORMAL
NITRITE, URINE: NEGATIVE
OTHER OBSERVATIONS UA: ABNORMAL
PH UA: 5.5 (ref 5–6)
PROTEIN UA: NEGATIVE
RBC UA: ABNORMAL /HPF (ref 0–4)
RENAL EPITHELIAL, UA: ABNORMAL /HPF
SPECIFIC GRAVITY UA: 1.02 (ref 1.01–1.02)
TRICHOMONAS: ABNORMAL
TURBIDITY: ABNORMAL
URINE HGB: NEGATIVE
UROBILINOGEN, URINE: NORMAL
WBC UA: ABNORMAL /HPF (ref 0–4)
YEAST: ABNORMAL

## 2018-04-18 PROCEDURE — 87086 URINE CULTURE/COLONY COUNT: CPT

## 2018-04-18 PROCEDURE — 81001 URINALYSIS AUTO W/SCOPE: CPT

## 2018-04-18 PROCEDURE — 99213 OFFICE O/P EST LOW 20 MIN: CPT | Performed by: NURSE PRACTITIONER

## 2018-04-18 RX ORDER — CIPROFLOXACIN 500 MG/1
500 TABLET, FILM COATED ORAL 2 TIMES DAILY
Qty: 20 TABLET | Refills: 0 | Status: SHIPPED | OUTPATIENT
Start: 2018-04-18 | End: 2018-04-28

## 2018-04-18 RX ORDER — DULOXETIN HYDROCHLORIDE 30 MG/1
30 CAPSULE, DELAYED RELEASE ORAL DAILY
Qty: 30 CAPSULE | Refills: 3 | Status: SHIPPED | OUTPATIENT
Start: 2018-04-18 | End: 2018-06-14 | Stop reason: SDUPTHER

## 2018-04-18 ASSESSMENT — ENCOUNTER SYMPTOMS
SHORTNESS OF BREATH: 0
VOMITING: 0
COUGH: 0
WHEEZING: 0

## 2018-04-19 LAB
CULTURE: NORMAL
CULTURE: NORMAL
Lab: NORMAL
SPECIMEN DESCRIPTION: NORMAL
SPECIMEN DESCRIPTION: NORMAL
STATUS: NORMAL

## 2018-04-23 ENCOUNTER — NURSE ONLY (OUTPATIENT)
Dept: LAB | Age: 58
End: 2018-04-23

## 2018-04-23 VITALS — DIASTOLIC BLOOD PRESSURE: 80 MMHG | HEART RATE: 104 BPM | SYSTOLIC BLOOD PRESSURE: 112 MMHG

## 2018-04-23 DIAGNOSIS — Z01.30 BP CHECK: Primary | ICD-10-CM

## 2018-04-25 ENCOUNTER — TELEPHONE (OUTPATIENT)
Dept: FAMILY MEDICINE CLINIC | Age: 58
End: 2018-04-25

## 2018-04-25 DIAGNOSIS — K21.9 GASTROESOPHAGEAL REFLUX DISEASE WITHOUT ESOPHAGITIS: ICD-10-CM

## 2018-04-25 DIAGNOSIS — F10.10 ALCOHOL ABUSE: ICD-10-CM

## 2018-04-25 DIAGNOSIS — R10.9 ABDOMINAL PAIN, UNSPECIFIED ABDOMINAL LOCATION: Primary | ICD-10-CM

## 2018-05-02 ENCOUNTER — NURSE ONLY (OUTPATIENT)
Dept: LAB | Age: 58
End: 2018-05-02

## 2018-05-02 VITALS — HEART RATE: 103 BPM | DIASTOLIC BLOOD PRESSURE: 74 MMHG | SYSTOLIC BLOOD PRESSURE: 110 MMHG

## 2018-05-02 DIAGNOSIS — Z01.30 BP CHECK: Primary | ICD-10-CM

## 2018-05-15 ENCOUNTER — INITIAL CONSULT (OUTPATIENT)
Dept: SURGERY | Age: 58
End: 2018-05-15
Payer: COMMERCIAL

## 2018-05-15 ENCOUNTER — HOSPITAL ENCOUNTER (OUTPATIENT)
Dept: LAB | Age: 58
Setting detail: SPECIMEN
Discharge: HOME | End: 2018-05-15
Payer: COMMERCIAL

## 2018-05-15 VITALS
SYSTOLIC BLOOD PRESSURE: 102 MMHG | WEIGHT: 175 LBS | HEART RATE: 88 BPM | DIASTOLIC BLOOD PRESSURE: 62 MMHG | TEMPERATURE: 97.2 F | BODY MASS INDEX: 27.47 KG/M2 | HEIGHT: 67 IN

## 2018-05-15 DIAGNOSIS — K75.9 HEPATITIS: Primary | ICD-10-CM

## 2018-05-15 DIAGNOSIS — R10.13 EPIGASTRIC PAIN: ICD-10-CM

## 2018-05-15 DIAGNOSIS — R94.4 ABNORMAL RENAL FUNCTION TEST: ICD-10-CM

## 2018-05-15 DIAGNOSIS — R11.0 NAUSEA: ICD-10-CM

## 2018-05-15 DIAGNOSIS — R79.89 ABNORMAL LIVER FUNCTION TESTS: ICD-10-CM

## 2018-05-15 DIAGNOSIS — E83.52 HYPERCALCEMIA: ICD-10-CM

## 2018-05-15 DIAGNOSIS — R11.0 NAUSEA: Primary | ICD-10-CM

## 2018-05-15 LAB
ALBUMIN SERPL-MCNC: 4.7 G/DL (ref 3.5–5.2)
ALBUMIN/GLOBULIN RATIO: 1.5 (ref 1–2.5)
ALP BLD-CCNC: 156 U/L (ref 35–104)
ALT SERPL-CCNC: 40 U/L (ref 5–33)
ANION GAP SERPL CALCULATED.3IONS-SCNC: 12 MMOL/L (ref 9–17)
AST SERPL-CCNC: 36 U/L
BILIRUB SERPL-MCNC: 0.22 MG/DL (ref 0.3–1.2)
BUN BLDV-MCNC: 15 MG/DL (ref 6–20)
BUN/CREAT BLD: 10 (ref 9–20)
CALCIUM SERPL-MCNC: 10.7 MG/DL (ref 8.6–10.4)
CHLORIDE BLD-SCNC: 98 MMOL/L (ref 98–107)
CO2: 29 MMOL/L (ref 20–31)
CREAT SERPL-MCNC: 1.43 MG/DL (ref 0.5–0.9)
GFR AFRICAN AMERICAN: 46 ML/MIN
GFR NON-AFRICAN AMERICAN: 38 ML/MIN
GFR SERPL CREATININE-BSD FRML MDRD: ABNORMAL ML/MIN/{1.73_M2}
GFR SERPL CREATININE-BSD FRML MDRD: ABNORMAL ML/MIN/{1.73_M2}
GLUCOSE BLD-MCNC: 104 MG/DL (ref 70–99)
POTASSIUM SERPL-SCNC: 4.8 MMOL/L (ref 3.7–5.3)
SODIUM BLD-SCNC: 139 MMOL/L (ref 135–144)
TOTAL PROTEIN: 7.9 G/DL (ref 6.4–8.3)

## 2018-05-15 PROCEDURE — 36415 COLL VENOUS BLD VENIPUNCTURE: CPT

## 2018-05-15 PROCEDURE — 80053 COMPREHEN METABOLIC PANEL: CPT

## 2018-05-15 PROCEDURE — 99203 OFFICE O/P NEW LOW 30 MIN: CPT | Performed by: SURGERY

## 2018-05-15 ASSESSMENT — ENCOUNTER SYMPTOMS
ABDOMINAL PAIN: 1
VOICE CHANGE: 0
NAUSEA: 1
CHOKING: 0
VOMITING: 0
CHEST TIGHTNESS: 0
SHORTNESS OF BREATH: 0
SORE THROAT: 0
WHEEZING: 0
TROUBLE SWALLOWING: 0

## 2018-05-17 ENCOUNTER — TELEPHONE (OUTPATIENT)
Dept: PRIMARY CARE CLINIC | Age: 58
End: 2018-05-17

## 2018-05-21 ENCOUNTER — HOSPITAL ENCOUNTER (OUTPATIENT)
Dept: LAB | Age: 58
Setting detail: SPECIMEN
Discharge: HOME | End: 2018-05-21
Payer: COMMERCIAL

## 2018-05-21 DIAGNOSIS — E83.52 HYPERCALCEMIA: ICD-10-CM

## 2018-05-21 DIAGNOSIS — K75.9 HEPATITIS: ICD-10-CM

## 2018-05-21 LAB
CALCIUM IONIZED: 1.33 MMOL/L (ref 1.13–1.33)
HBV SURFACE AB TITR SER: <3.5 MIU/ML
HEPATITIS B SURFACE ANTIGEN: NONREACTIVE
HEPATITIS C ANTIBODY: NONREACTIVE
PTH INTACT: 40.45 PG/ML (ref 15–65)

## 2018-05-21 PROCEDURE — 86803 HEPATITIS C AB TEST: CPT

## 2018-05-21 PROCEDURE — 36415 COLL VENOUS BLD VENIPUNCTURE: CPT

## 2018-05-21 PROCEDURE — 82330 ASSAY OF CALCIUM: CPT

## 2018-05-21 PROCEDURE — 86317 IMMUNOASSAY INFECTIOUS AGENT: CPT

## 2018-05-21 PROCEDURE — 83970 ASSAY OF PARATHORMONE: CPT

## 2018-05-21 PROCEDURE — 87340 HEPATITIS B SURFACE AG IA: CPT

## 2018-06-14 ENCOUNTER — HOSPITAL ENCOUNTER (OUTPATIENT)
Dept: LAB | Age: 58
Setting detail: SPECIMEN
Discharge: HOME | End: 2018-06-14
Payer: COMMERCIAL

## 2018-06-14 ENCOUNTER — OFFICE VISIT (OUTPATIENT)
Dept: FAMILY MEDICINE CLINIC | Age: 58
End: 2018-06-14
Payer: COMMERCIAL

## 2018-06-14 VITALS
WEIGHT: 171 LBS | HEART RATE: 78 BPM | DIASTOLIC BLOOD PRESSURE: 60 MMHG | BODY MASS INDEX: 27.48 KG/M2 | HEIGHT: 66 IN | SYSTOLIC BLOOD PRESSURE: 92 MMHG

## 2018-06-14 DIAGNOSIS — K21.9 GASTROESOPHAGEAL REFLUX DISEASE WITHOUT ESOPHAGITIS: ICD-10-CM

## 2018-06-14 DIAGNOSIS — E11.9 TYPE 2 DIABETES MELLITUS WITHOUT COMPLICATION, UNSPECIFIED LONG TERM INSULIN USE STATUS: ICD-10-CM

## 2018-06-14 DIAGNOSIS — G63 POLYNEUROPATHY ASSOCIATED WITH UNDERLYING DISEASE (HCC): ICD-10-CM

## 2018-06-14 DIAGNOSIS — I10 ESSENTIAL HYPERTENSION: ICD-10-CM

## 2018-06-14 DIAGNOSIS — E78.2 MIXED HYPERLIPIDEMIA: ICD-10-CM

## 2018-06-14 DIAGNOSIS — E11.9 TYPE 2 DIABETES MELLITUS WITHOUT COMPLICATION, UNSPECIFIED LONG TERM INSULIN USE STATUS: Primary | ICD-10-CM

## 2018-06-14 LAB
ALBUMIN SERPL-MCNC: 4.5 G/DL (ref 3.5–5.2)
ALBUMIN/GLOBULIN RATIO: 1.7 (ref 1–2.5)
ALP BLD-CCNC: 113 U/L (ref 35–104)
ALT SERPL-CCNC: 24 U/L (ref 5–33)
ANION GAP SERPL CALCULATED.3IONS-SCNC: 12 MMOL/L (ref 9–17)
AST SERPL-CCNC: 21 U/L
BILIRUB SERPL-MCNC: 0.38 MG/DL (ref 0.3–1.2)
BUN BLDV-MCNC: 10 MG/DL (ref 6–20)
BUN/CREAT BLD: 7 (ref 9–20)
CALCIUM SERPL-MCNC: 9.9 MG/DL (ref 8.6–10.4)
CHLORIDE BLD-SCNC: 91 MMOL/L (ref 98–107)
CHOLESTEROL/HDL RATIO: 2.5
CHOLESTEROL: 172 MG/DL
CO2: 29 MMOL/L (ref 20–31)
CREAT SERPL-MCNC: 1.41 MG/DL (ref 0.5–0.9)
ESTIMATED AVERAGE GLUCOSE: 108 MG/DL
GFR AFRICAN AMERICAN: 47 ML/MIN
GFR NON-AFRICAN AMERICAN: 39 ML/MIN
GFR SERPL CREATININE-BSD FRML MDRD: ABNORMAL ML/MIN/{1.73_M2}
GFR SERPL CREATININE-BSD FRML MDRD: ABNORMAL ML/MIN/{1.73_M2}
GLUCOSE BLD-MCNC: 105 MG/DL (ref 70–99)
HBA1C MFR BLD: 5.4 % (ref 4.8–5.9)
HDLC SERPL-MCNC: 70 MG/DL
LDL CHOLESTEROL: 73 MG/DL (ref 0–130)
POTASSIUM SERPL-SCNC: 5.1 MMOL/L (ref 3.7–5.3)
SODIUM BLD-SCNC: 132 MMOL/L (ref 135–144)
TOTAL PROTEIN: 7.2 G/DL (ref 6.4–8.3)
TRIGL SERPL-MCNC: 144 MG/DL
VLDLC SERPL CALC-MCNC: NORMAL MG/DL (ref 1–30)

## 2018-06-14 PROCEDURE — 80053 COMPREHEN METABOLIC PANEL: CPT

## 2018-06-14 PROCEDURE — 80061 LIPID PANEL: CPT

## 2018-06-14 PROCEDURE — 36415 COLL VENOUS BLD VENIPUNCTURE: CPT

## 2018-06-14 PROCEDURE — 83036 HEMOGLOBIN GLYCOSYLATED A1C: CPT

## 2018-06-14 PROCEDURE — 99214 OFFICE O/P EST MOD 30 MIN: CPT | Performed by: NURSE PRACTITIONER

## 2018-06-14 RX ORDER — DULOXETIN HYDROCHLORIDE 60 MG/1
60 CAPSULE, DELAYED RELEASE ORAL DAILY
Qty: 90 CAPSULE | Refills: 1 | Status: SHIPPED | OUTPATIENT
Start: 2018-06-14 | End: 2019-03-07 | Stop reason: ALTCHOICE

## 2018-06-14 RX ORDER — LISINOPRIL 40 MG/1
40 TABLET ORAL DAILY
Qty: 90 TABLET | Refills: 1 | Status: SHIPPED | OUTPATIENT
Start: 2018-06-14 | End: 2019-01-04 | Stop reason: SDUPTHER

## 2018-06-14 RX ORDER — DULOXETIN HYDROCHLORIDE 30 MG/1
30 CAPSULE, DELAYED RELEASE ORAL DAILY
Qty: 30 CAPSULE | Refills: 3 | Status: SHIPPED | OUTPATIENT
Start: 2018-06-14 | End: 2019-01-11

## 2018-06-14 RX ORDER — HYDROCHLOROTHIAZIDE 12.5 MG/1
CAPSULE, GELATIN COATED ORAL
Qty: 90 CAPSULE | Refills: 1 | Status: SHIPPED | OUTPATIENT
Start: 2018-06-14 | End: 2019-03-07

## 2018-06-14 RX ORDER — OMEPRAZOLE 20 MG/1
20 CAPSULE, DELAYED RELEASE ORAL DAILY
Qty: 90 CAPSULE | Refills: 1 | Status: SHIPPED | OUTPATIENT
Start: 2018-06-14 | End: 2019-07-11 | Stop reason: SDUPTHER

## 2018-06-14 ASSESSMENT — PATIENT HEALTH QUESTIONNAIRE - PHQ9
2. FEELING DOWN, DEPRESSED OR HOPELESS: 1
SUM OF ALL RESPONSES TO PHQ9 QUESTIONS 1 & 2: 2
1. LITTLE INTEREST OR PLEASURE IN DOING THINGS: 1
SUM OF ALL RESPONSES TO PHQ QUESTIONS 1-9: 2

## 2018-06-14 ASSESSMENT — ENCOUNTER SYMPTOMS
CONSTIPATION: 0
SINUS PRESSURE: 0
BLURRED VISION: 0
COUGH: 0
VOMITING: 0
ABDOMINAL PAIN: 0
HEARTBURN: 0
SHORTNESS OF BREATH: 0
WHEEZING: 0
SORE THROAT: 0
NAUSEA: 0
ROS SKIN COMMENTS: TOENAIL FUNGUS
SINUS PAIN: 0
DIARRHEA: 0
RHINORRHEA: 0

## 2018-08-14 ENCOUNTER — TELEPHONE (OUTPATIENT)
Dept: FAMILY MEDICINE CLINIC | Age: 58
End: 2018-08-14

## 2018-08-14 DIAGNOSIS — I10 ESSENTIAL HYPERTENSION: Primary | ICD-10-CM

## 2018-08-14 RX ORDER — MINERAL OIL 100 MG/100ML
OIL ORAL; TOPICAL
Qty: 1 DEVICE | Refills: 0 | Status: SHIPPED | OUTPATIENT
Start: 2018-08-14

## 2018-08-14 NOTE — TELEPHONE ENCOUNTER
Pt returning nurses call, states she wants a complete BP monitor sent to [facility].

## 2018-09-10 ENCOUNTER — TELEPHONE (OUTPATIENT)
Dept: FAMILY MEDICINE CLINIC | Age: 58
End: 2018-09-10

## 2018-09-10 NOTE — TELEPHONE ENCOUNTER
Spoke with patient and patient is having a problem with her insurance company covering her ER visit from 04/11/2018. Patient called our office and was recommended to go to the ER because she had passed out. Patient would like a letter that states that we did recommend her to the ER because of syncope episodes. Patient is aware that [facility]SS is out office until 09/18/2018 and patient is ok to wait until [facility] gets back.

## 2018-09-21 DIAGNOSIS — E78.2 MIXED HYPERLIPIDEMIA: ICD-10-CM

## 2018-09-24 RX ORDER — ATORVASTATIN CALCIUM 10 MG/1
TABLET, FILM COATED ORAL
Qty: 90 TABLET | Refills: 1 | Status: SHIPPED | OUTPATIENT
Start: 2018-09-24 | End: 2019-01-04 | Stop reason: SDUPTHER

## 2018-11-19 ENCOUNTER — TELEPHONE (OUTPATIENT)
Dept: FAMILY MEDICINE CLINIC | Age: 58
End: 2018-11-19

## 2018-11-19 NOTE — TELEPHONE ENCOUNTER
Pt calling stating she has diabetic nerve pain and is requesting a script for compression socks to be sent to [facility], please advise pt is you can do this at above number.

## 2018-12-06 ENCOUNTER — HOSPITAL ENCOUNTER (OUTPATIENT)
Age: 58
Setting detail: SPECIMEN
Discharge: HOME | End: 2018-12-06
Payer: COMMERCIAL

## 2018-12-06 ENCOUNTER — HOSPITAL ENCOUNTER (OUTPATIENT)
Dept: MAMMOGRAPHY | Age: 58
Discharge: HOME | End: 2018-12-08
Payer: COMMERCIAL

## 2018-12-06 ENCOUNTER — OFFICE VISIT (OUTPATIENT)
Dept: OBGYN | Age: 58
End: 2018-12-06
Payer: COMMERCIAL

## 2018-12-06 VITALS
DIASTOLIC BLOOD PRESSURE: 80 MMHG | WEIGHT: 154 LBS | RESPIRATION RATE: 18 BRPM | SYSTOLIC BLOOD PRESSURE: 124 MMHG | BODY MASS INDEX: 24.75 KG/M2 | HEART RATE: 82 BPM | HEIGHT: 66 IN

## 2018-12-06 DIAGNOSIS — Z12.31 VISIT FOR SCREENING MAMMOGRAM: ICD-10-CM

## 2018-12-06 DIAGNOSIS — Z12.4 SCREENING FOR CERVICAL CANCER: ICD-10-CM

## 2018-12-06 DIAGNOSIS — Z01.419 WELL FEMALE EXAM WITH ROUTINE GYNECOLOGICAL EXAM: Primary | ICD-10-CM

## 2018-12-06 DIAGNOSIS — Z12.31 SCREENING MAMMOGRAM, ENCOUNTER FOR: ICD-10-CM

## 2018-12-06 PROCEDURE — 77067 SCR MAMMO BI INCL CAD: CPT

## 2018-12-06 PROCEDURE — G0145 SCR C/V CYTO,THINLAYER,RESCR: HCPCS

## 2018-12-06 PROCEDURE — 99396 PREV VISIT EST AGE 40-64: CPT | Performed by: NURSE PRACTITIONER

## 2018-12-06 ASSESSMENT — PATIENT HEALTH QUESTIONNAIRE - PHQ9
SUM OF ALL RESPONSES TO PHQ9 QUESTIONS 1 & 2: 0
2. FEELING DOWN, DEPRESSED OR HOPELESS: 0
1. LITTLE INTEREST OR PLEASURE IN DOING THINGS: 0
SUM OF ALL RESPONSES TO PHQ QUESTIONS 1-9: 0
SUM OF ALL RESPONSES TO PHQ QUESTIONS 1-9: 0

## 2018-12-06 NOTE — PROGRESS NOTES
________________________________________________________________________  REVIEW OF SYSTEMS:     A minimum of an eleven point review of systems was completed. Review Of Systems (11 point):  General ROS:  positive for weight loss  Hematological and Lymphatic ROS:negative   Breast ROS: negative  Cardiovascular ROS: negative  Respiratory ROS: negative   Gastrointestinal ROS: negative  Genito-Urinary ROS: negative  Psychological ROS: negative  Neurological ROS: negative  Musculoskeletal ROS: negative  Dermatological ROS: negative                                                                                                                                                                                   PHYSICAL Exam:     Constitutional:  Blood pressure 124/80, pulse 82, resp. rate 18, height 5' 5.98\" (1.676 m), weight 154 lb (69.9 kg), last menstrual period 08/28/2016, not currently breastfeeding. General Appearance: This  is a well Developed, well Nourished, well groomed female. Her BMI was reviewed. Skin:  There was a Normal Inspection of the skin without rashes or lesions. There were no rashes. (Papular, Maculopapular, Hives, Pustular, Macular)     There were no lesions (Ulcers, Erythema, Abn. Appearing Nevi)      Lymphatic:  No Lymph Nodes were Palpable in the neck , axilla or groin. Neck and EENT:  The neck was supple. There were no masses   The thyroid was not enlarged and had no masses. PERRLA, Nares Patent No Masses    Respiratory: The lungs were auscultated and found to be clear. There were no rales, rhonchi or wheezes. There was a good respiratory effort. Cardiovascular: The heart was in a regular rate and rhythm. . No S3 or S4. There was no murmur appreciated. Extremities: The patients extremities were without calf tenderness, edema, or varicosities. There was full range of motion in all four extremities. Pulses in all four extremities    Abdomen: The abdomen was soft

## 2018-12-11 ENCOUNTER — HOSPITAL ENCOUNTER (OUTPATIENT)
Dept: LAB | Age: 58
Discharge: HOME | End: 2018-12-11
Payer: COMMERCIAL

## 2018-12-11 DIAGNOSIS — E78.2 MIXED HYPERLIPIDEMIA: ICD-10-CM

## 2018-12-11 DIAGNOSIS — E11.9 TYPE 2 DIABETES MELLITUS WITHOUT COMPLICATION (HCC): ICD-10-CM

## 2018-12-11 DIAGNOSIS — G63 POLYNEUROPATHY ASSOCIATED WITH UNDERLYING DISEASE (HCC): ICD-10-CM

## 2018-12-11 LAB
ABSOLUTE EOS #: 0.2 K/UL (ref 0–0.4)
ABSOLUTE IMMATURE GRANULOCYTE: ABNORMAL K/UL (ref 0–0.3)
ABSOLUTE LYMPH #: 2.3 K/UL (ref 1–4.8)
ABSOLUTE MONO #: 0.6 K/UL (ref 0.1–1.2)
ALBUMIN SERPL-MCNC: 4.9 G/DL (ref 3.5–5.2)
ALBUMIN/GLOBULIN RATIO: 1.7 (ref 1–2.5)
ALP BLD-CCNC: 133 U/L (ref 35–104)
ALT SERPL-CCNC: 41 U/L (ref 5–33)
ANION GAP SERPL CALCULATED.3IONS-SCNC: 13 MMOL/L (ref 9–17)
AST SERPL-CCNC: 28 U/L
BASOPHILS # BLD: 1 % (ref 0–1)
BASOPHILS ABSOLUTE: 0.1 K/UL (ref 0–0.2)
BILIRUB SERPL-MCNC: 0.27 MG/DL (ref 0.3–1.2)
BUN BLDV-MCNC: 28 MG/DL (ref 6–20)
BUN/CREAT BLD: 16 (ref 9–20)
CALCIUM SERPL-MCNC: 10.2 MG/DL (ref 8.6–10.4)
CHLORIDE BLD-SCNC: 102 MMOL/L (ref 98–107)
CHOLESTEROL/HDL RATIO: 2.3
CHOLESTEROL: 177 MG/DL
CO2: 25 MMOL/L (ref 20–31)
CREAT SERPL-MCNC: 1.72 MG/DL (ref 0.5–0.9)
CREATININE URINE: 171.1 MG/DL (ref 28–217)
DIFFERENTIAL TYPE: ABNORMAL
EOSINOPHILS RELATIVE PERCENT: 3 % (ref 1–7)
ESTIMATED AVERAGE GLUCOSE: 108 MG/DL
GFR AFRICAN AMERICAN: 37 ML/MIN
GFR NON-AFRICAN AMERICAN: 31 ML/MIN
GFR SERPL CREATININE-BSD FRML MDRD: ABNORMAL ML/MIN/{1.73_M2}
GFR SERPL CREATININE-BSD FRML MDRD: ABNORMAL ML/MIN/{1.73_M2}
GLUCOSE BLD-MCNC: 101 MG/DL (ref 70–99)
HBA1C MFR BLD: 5.4 % (ref 4.8–5.9)
HCT VFR BLD CALC: 38 % (ref 36–46)
HDLC SERPL-MCNC: 78 MG/DL
HEMOGLOBIN: 12.5 G/DL (ref 12–16)
IMMATURE GRANULOCYTES: ABNORMAL %
LDL CHOLESTEROL: 77 MG/DL (ref 0–130)
LYMPHOCYTES # BLD: 25 % (ref 16–46)
MCH RBC QN AUTO: 32.8 PG (ref 26–34)
MCHC RBC AUTO-ENTMCNC: 32.9 G/DL (ref 31–37)
MCV RBC AUTO: 99.5 FL (ref 80–100)
MICROALBUMIN/CREAT 24H UR: 19 MG/L
MICROALBUMIN/CREAT UR-RTO: 11 MCG/MG CREAT
MONOCYTES # BLD: 7 % (ref 4–11)
NRBC AUTOMATED: ABNORMAL PER 100 WBC
PDW BLD-RTO: 13.2 % (ref 11–14.5)
PLATELET # BLD: 365 K/UL (ref 140–450)
PLATELET ESTIMATE: ABNORMAL
PMV BLD AUTO: 8.3 FL (ref 6–12)
POTASSIUM SERPL-SCNC: 4.5 MMOL/L (ref 3.7–5.3)
RBC # BLD: 3.82 M/UL (ref 4–5.2)
RBC # BLD: ABNORMAL 10*6/UL
SEG NEUTROPHILS: 64 % (ref 43–77)
SEGMENTED NEUTROPHILS ABSOLUTE COUNT: 5.9 K/UL (ref 1.8–7.7)
SODIUM BLD-SCNC: 140 MMOL/L (ref 135–144)
TOTAL PROTEIN: 7.8 G/DL (ref 6.4–8.3)
TRIGL SERPL-MCNC: 111 MG/DL
VLDLC SERPL CALC-MCNC: NORMAL MG/DL (ref 1–30)
WBC # BLD: 9.2 K/UL (ref 3.5–11)
WBC # BLD: ABNORMAL 10*3/UL

## 2018-12-11 PROCEDURE — 85025 COMPLETE CBC W/AUTO DIFF WBC: CPT

## 2018-12-11 PROCEDURE — 36415 COLL VENOUS BLD VENIPUNCTURE: CPT

## 2018-12-11 PROCEDURE — 80061 LIPID PANEL: CPT

## 2018-12-11 PROCEDURE — 82570 ASSAY OF URINE CREATININE: CPT

## 2018-12-11 PROCEDURE — 82043 UR ALBUMIN QUANTITATIVE: CPT

## 2018-12-11 PROCEDURE — 80053 COMPREHEN METABOLIC PANEL: CPT

## 2018-12-11 PROCEDURE — 83036 HEMOGLOBIN GLYCOSYLATED A1C: CPT

## 2018-12-18 LAB — CYTOLOGY REPORT: NORMAL

## 2019-01-04 ENCOUNTER — OFFICE VISIT (OUTPATIENT)
Dept: FAMILY MEDICINE CLINIC | Age: 59
End: 2019-01-04
Payer: COMMERCIAL

## 2019-01-04 VITALS
HEIGHT: 66 IN | WEIGHT: 160 LBS | HEART RATE: 96 BPM | BODY MASS INDEX: 25.71 KG/M2 | DIASTOLIC BLOOD PRESSURE: 62 MMHG | SYSTOLIC BLOOD PRESSURE: 102 MMHG

## 2019-01-04 DIAGNOSIS — G63 POLYNEUROPATHY ASSOCIATED WITH UNDERLYING DISEASE (HCC): ICD-10-CM

## 2019-01-04 DIAGNOSIS — E11.9 TYPE 2 DIABETES MELLITUS WITHOUT COMPLICATION, UNSPECIFIED WHETHER LONG TERM INSULIN USE (HCC): Primary | ICD-10-CM

## 2019-01-04 DIAGNOSIS — R79.89 ELEVATED SERUM CREATININE: ICD-10-CM

## 2019-01-04 DIAGNOSIS — Z72.0 TOBACCO ABUSE: ICD-10-CM

## 2019-01-04 DIAGNOSIS — I10 ESSENTIAL HYPERTENSION: ICD-10-CM

## 2019-01-04 DIAGNOSIS — F32.A DEPRESSION, UNSPECIFIED DEPRESSION TYPE: ICD-10-CM

## 2019-01-04 DIAGNOSIS — E78.2 MIXED HYPERLIPIDEMIA: ICD-10-CM

## 2019-01-04 DIAGNOSIS — K21.9 GASTROESOPHAGEAL REFLUX DISEASE WITHOUT ESOPHAGITIS: ICD-10-CM

## 2019-01-04 PROCEDURE — 99214 OFFICE O/P EST MOD 30 MIN: CPT | Performed by: NURSE PRACTITIONER

## 2019-01-04 RX ORDER — ATORVASTATIN CALCIUM 10 MG/1
TABLET, FILM COATED ORAL
Qty: 90 TABLET | Refills: 1 | Status: SHIPPED | OUTPATIENT
Start: 2019-01-04 | End: 2019-07-11 | Stop reason: SDUPTHER

## 2019-01-04 RX ORDER — BUPROPION HYDROCHLORIDE 150 MG/1
150 TABLET, EXTENDED RELEASE ORAL 2 TIMES DAILY
Qty: 60 TABLET | Refills: 3 | Status: SHIPPED | OUTPATIENT
Start: 2019-01-04 | End: 2019-06-05 | Stop reason: SDUPTHER

## 2019-01-04 RX ORDER — LISINOPRIL 40 MG/1
40 TABLET ORAL DAILY
Qty: 90 TABLET | Refills: 1 | Status: SHIPPED | OUTPATIENT
Start: 2019-01-04 | End: 2019-07-11 | Stop reason: SDUPTHER

## 2019-01-04 RX ORDER — OMEPRAZOLE 20 MG/1
20 CAPSULE, DELAYED RELEASE ORAL DAILY
Qty: 90 CAPSULE | Refills: 1 | Status: CANCELLED | OUTPATIENT
Start: 2019-01-04

## 2019-01-04 ASSESSMENT — ENCOUNTER SYMPTOMS
SINUS PRESSURE: 0
DIARRHEA: 0
RHINORRHEA: 0
SORE THROAT: 0
NAUSEA: 0
VOMITING: 0
WHEEZING: 0
SHORTNESS OF BREATH: 0
ABDOMINAL PAIN: 0
CONSTIPATION: 0
BLURRED VISION: 0
HEARTBURN: 0
SINUS PAIN: 0
COUGH: 0

## 2019-01-04 ASSESSMENT — PATIENT HEALTH QUESTIONNAIRE - PHQ9
1. LITTLE INTEREST OR PLEASURE IN DOING THINGS: 0
SUM OF ALL RESPONSES TO PHQ QUESTIONS 1-9: 0
2. FEELING DOWN, DEPRESSED OR HOPELESS: 0
SUM OF ALL RESPONSES TO PHQ QUESTIONS 1-9: 0
SUM OF ALL RESPONSES TO PHQ9 QUESTIONS 1 & 2: 0

## 2019-01-05 ASSESSMENT — ENCOUNTER SYMPTOMS: VISUAL CHANGE: 0

## 2019-01-11 ENCOUNTER — HOSPITAL ENCOUNTER (OUTPATIENT)
Dept: LAB | Age: 59
Discharge: HOME | End: 2019-01-11
Payer: COMMERCIAL

## 2019-01-11 ENCOUNTER — OFFICE VISIT (OUTPATIENT)
Dept: PRIMARY CARE CLINIC | Age: 59
End: 2019-01-11
Payer: COMMERCIAL

## 2019-01-11 ENCOUNTER — TELEPHONE (OUTPATIENT)
Dept: FAMILY MEDICINE CLINIC | Age: 59
End: 2019-01-11

## 2019-01-11 VITALS
SYSTOLIC BLOOD PRESSURE: 106 MMHG | BODY MASS INDEX: 26.58 KG/M2 | RESPIRATION RATE: 18 BRPM | DIASTOLIC BLOOD PRESSURE: 70 MMHG | HEIGHT: 66 IN | TEMPERATURE: 97.3 F | WEIGHT: 165.4 LBS | HEART RATE: 82 BPM | OXYGEN SATURATION: 99 %

## 2019-01-11 DIAGNOSIS — R26.89 BALANCE PROBLEM: ICD-10-CM

## 2019-01-11 DIAGNOSIS — R42 DIZZINESS: ICD-10-CM

## 2019-01-11 DIAGNOSIS — R42 DIZZINESS: Primary | ICD-10-CM

## 2019-01-11 DIAGNOSIS — R47.89 SPELL OF CHANGE IN SPEECH: ICD-10-CM

## 2019-01-11 LAB
GLUCOSE BLD-MCNC: 94 MG/DL
TSH SERPL DL<=0.05 MIU/L-ACNC: 1.15 MIU/L (ref 0.3–5)

## 2019-01-11 PROCEDURE — 36415 COLL VENOUS BLD VENIPUNCTURE: CPT

## 2019-01-11 PROCEDURE — 84443 ASSAY THYROID STIM HORMONE: CPT

## 2019-01-11 PROCEDURE — 82962 GLUCOSE BLOOD TEST: CPT | Performed by: FAMILY MEDICINE

## 2019-01-11 PROCEDURE — 99214 OFFICE O/P EST MOD 30 MIN: CPT | Performed by: FAMILY MEDICINE

## 2019-01-11 RX ORDER — FLUOXETINE HYDROCHLORIDE 20 MG/1
60 CAPSULE ORAL DAILY
COMMUNITY
End: 2021-10-22 | Stop reason: ALTCHOICE

## 2019-01-11 ASSESSMENT — ENCOUNTER SYMPTOMS
ABDOMINAL PAIN: 0
VOMITING: 0
DIARRHEA: 0

## 2019-01-17 ENCOUNTER — TELEPHONE (OUTPATIENT)
Dept: FAMILY MEDICINE CLINIC | Age: 59
End: 2019-01-17

## 2019-01-17 RX ORDER — GABAPENTIN 300 MG/1
300 CAPSULE ORAL 3 TIMES DAILY
Qty: 270 CAPSULE | Refills: 1 | OUTPATIENT
Start: 2019-01-17 | End: 2019-04-17

## 2019-02-25 DIAGNOSIS — E11.9 TYPE 2 DIABETES MELLITUS WITHOUT COMPLICATION, UNSPECIFIED WHETHER LONG TERM INSULIN USE (HCC): ICD-10-CM

## 2019-02-25 DIAGNOSIS — G63 POLYNEUROPATHY ASSOCIATED WITH UNDERLYING DISEASE (HCC): Primary | ICD-10-CM

## 2019-02-26 RX ORDER — GABAPENTIN 300 MG/1
300 CAPSULE ORAL 3 TIMES DAILY
Qty: 270 CAPSULE | Refills: 0 | Status: CANCELLED | OUTPATIENT
Start: 2019-02-26 | End: 2019-03-28

## 2019-03-07 ENCOUNTER — OFFICE VISIT (OUTPATIENT)
Dept: FAMILY MEDICINE CLINIC | Age: 59
End: 2019-03-07
Payer: COMMERCIAL

## 2019-03-07 VITALS
SYSTOLIC BLOOD PRESSURE: 122 MMHG | OXYGEN SATURATION: 99 % | HEIGHT: 67 IN | DIASTOLIC BLOOD PRESSURE: 72 MMHG | WEIGHT: 159 LBS | HEART RATE: 86 BPM | BODY MASS INDEX: 24.96 KG/M2

## 2019-03-07 DIAGNOSIS — G63 POLYNEUROPATHY ASSOCIATED WITH UNDERLYING DISEASE (HCC): Primary | ICD-10-CM

## 2019-03-07 DIAGNOSIS — E11.9 TYPE 2 DIABETES MELLITUS WITHOUT COMPLICATION, UNSPECIFIED WHETHER LONG TERM INSULIN USE (HCC): ICD-10-CM

## 2019-03-07 PROCEDURE — 99213 OFFICE O/P EST LOW 20 MIN: CPT | Performed by: NURSE PRACTITIONER

## 2019-03-07 RX ORDER — OLANZAPINE 10 MG/1
TABLET ORAL
Refills: 0 | COMMUNITY
Start: 2019-01-25 | End: 2021-03-18 | Stop reason: ALTCHOICE

## 2019-03-07 RX ORDER — GABAPENTIN 300 MG/1
300 CAPSULE ORAL 3 TIMES DAILY
Qty: 270 CAPSULE | Refills: 0 | Status: SHIPPED | OUTPATIENT
Start: 2019-03-07 | End: 2019-03-21 | Stop reason: SINTOL

## 2019-03-07 ASSESSMENT — ENCOUNTER SYMPTOMS
RESPIRATORY NEGATIVE: 1
BLURRED VISION: 0
GASTROINTESTINAL NEGATIVE: 1

## 2019-03-15 ENCOUNTER — OFFICE VISIT (OUTPATIENT)
Dept: PRIMARY CARE CLINIC | Age: 59
End: 2019-03-15
Payer: COMMERCIAL

## 2019-03-15 ENCOUNTER — HOSPITAL ENCOUNTER (OUTPATIENT)
Age: 59
Setting detail: SPECIMEN
Discharge: HOME | End: 2019-03-15
Payer: COMMERCIAL

## 2019-03-15 VITALS
WEIGHT: 166 LBS | OXYGEN SATURATION: 94 % | DIASTOLIC BLOOD PRESSURE: 82 MMHG | HEIGHT: 66 IN | SYSTOLIC BLOOD PRESSURE: 136 MMHG | HEART RATE: 77 BPM | RESPIRATION RATE: 14 BRPM | BODY MASS INDEX: 26.68 KG/M2

## 2019-03-15 DIAGNOSIS — R47.1 DYSARTHRIA: Primary | ICD-10-CM

## 2019-03-15 DIAGNOSIS — R30.0 DYSURIA: ICD-10-CM

## 2019-03-15 DIAGNOSIS — N30.00 ACUTE CYSTITIS WITHOUT HEMATURIA: ICD-10-CM

## 2019-03-15 DIAGNOSIS — R25.3 MUSCLE TWITCHING: ICD-10-CM

## 2019-03-15 LAB
-: ABNORMAL
AMORPHOUS: ABNORMAL
BACTERIA: ABNORMAL
BILIRUBIN URINE: NEGATIVE
CASTS UA: ABNORMAL /LPF (ref 0–2)
COLOR: ABNORMAL
COMMENT UA: ABNORMAL
CRYSTALS, UA: ABNORMAL /HPF
EPITHELIAL CELLS UA: ABNORMAL /HPF (ref 0–5)
GLUCOSE URINE: NEGATIVE
KETONES, URINE: NEGATIVE
LEUKOCYTE ESTERASE, URINE: ABNORMAL
MUCUS: ABNORMAL
NITRITE, URINE: NEGATIVE
OTHER OBSERVATIONS UA: ABNORMAL
PH UA: 7 (ref 5–6)
PROTEIN UA: NEGATIVE
RBC UA: ABNORMAL /HPF (ref 0–4)
RENAL EPITHELIAL, UA: ABNORMAL /HPF
SPECIFIC GRAVITY UA: 1.01 (ref 1.01–1.02)
TRICHOMONAS: ABNORMAL
TURBIDITY: ABNORMAL
URINE HGB: NEGATIVE
UROBILINOGEN, URINE: NORMAL
WBC UA: ABNORMAL /HPF (ref 0–4)
YEAST: ABNORMAL

## 2019-03-15 PROCEDURE — 87086 URINE CULTURE/COLONY COUNT: CPT

## 2019-03-15 PROCEDURE — 81001 URINALYSIS AUTO W/SCOPE: CPT

## 2019-03-15 PROCEDURE — 99214 OFFICE O/P EST MOD 30 MIN: CPT | Performed by: PHYSICIAN ASSISTANT

## 2019-03-15 RX ORDER — SULFAMETHOXAZOLE AND TRIMETHOPRIM 800; 160 MG/1; MG/1
1 TABLET ORAL 2 TIMES DAILY
Qty: 14 TABLET | Refills: 0 | Status: SHIPPED | OUTPATIENT
Start: 2019-03-15 | End: 2019-03-22

## 2019-03-15 ASSESSMENT — ENCOUNTER SYMPTOMS
WHEEZING: 0
BACK PAIN: 0
NAUSEA: 0
VOMITING: 0
SHORTNESS OF BREATH: 0
COUGH: 0
DIARRHEA: 0

## 2019-03-15 ASSESSMENT — PATIENT HEALTH QUESTIONNAIRE - PHQ9
2. FEELING DOWN, DEPRESSED OR HOPELESS: 0
SUM OF ALL RESPONSES TO PHQ QUESTIONS 1-9: 0
1. LITTLE INTEREST OR PLEASURE IN DOING THINGS: 0
SUM OF ALL RESPONSES TO PHQ9 QUESTIONS 1 & 2: 0
SUM OF ALL RESPONSES TO PHQ QUESTIONS 1-9: 0

## 2019-03-16 ENCOUNTER — HOSPITAL ENCOUNTER (OUTPATIENT)
Dept: LAB | Age: 59
Discharge: HOME | End: 2019-03-16
Payer: COMMERCIAL

## 2019-03-16 DIAGNOSIS — R47.1 DYSARTHRIA: ICD-10-CM

## 2019-03-16 DIAGNOSIS — R25.3 MUSCLE TWITCHING: ICD-10-CM

## 2019-03-16 LAB
ABSOLUTE EOS #: 0.2 K/UL (ref 0–0.4)
ABSOLUTE IMMATURE GRANULOCYTE: NORMAL K/UL (ref 0–0.3)
ABSOLUTE LYMPH #: 1.5 K/UL (ref 1–4.8)
ABSOLUTE MONO #: 0.7 K/UL (ref 0.1–1.2)
ALBUMIN SERPL-MCNC: 4.9 G/DL (ref 3.5–5.2)
ALBUMIN/GLOBULIN RATIO: 1.8 (ref 1–2.5)
ALP BLD-CCNC: 142 U/L (ref 35–104)
ALT SERPL-CCNC: 44 U/L (ref 5–33)
ANION GAP SERPL CALCULATED.3IONS-SCNC: 14 MMOL/L (ref 9–17)
AST SERPL-CCNC: 40 U/L
BASOPHILS # BLD: 1 % (ref 0–1)
BASOPHILS ABSOLUTE: 0 K/UL (ref 0–0.2)
BILIRUB SERPL-MCNC: 0.29 MG/DL (ref 0.3–1.2)
BUN BLDV-MCNC: 11 MG/DL (ref 6–20)
BUN/CREAT BLD: 10 (ref 9–20)
C-REACTIVE PROTEIN: 2.8 MG/L (ref 0–5)
CALCIUM SERPL-MCNC: 10.3 MG/DL (ref 8.6–10.4)
CHLORIDE BLD-SCNC: 101 MMOL/L (ref 98–107)
CK MB: 1.9 NG/ML
CO2: 28 MMOL/L (ref 20–31)
CREAT SERPL-MCNC: 1.15 MG/DL (ref 0.5–0.9)
CULTURE: NORMAL
DIFFERENTIAL TYPE: NORMAL
EOSINOPHILS RELATIVE PERCENT: 3 % (ref 1–7)
GFR AFRICAN AMERICAN: 59 ML/MIN
GFR NON-AFRICAN AMERICAN: 49 ML/MIN
GFR SERPL CREATININE-BSD FRML MDRD: ABNORMAL ML/MIN/{1.73_M2}
GFR SERPL CREATININE-BSD FRML MDRD: ABNORMAL ML/MIN/{1.73_M2}
GLUCOSE BLD-MCNC: 102 MG/DL (ref 70–99)
HCT VFR BLD CALC: 43.2 % (ref 36–46)
HEMOGLOBIN: 14 G/DL (ref 12–16)
IMMATURE GRANULOCYTES: NORMAL %
LYMPHOCYTES # BLD: 22 % (ref 16–46)
Lab: NORMAL
MCH RBC QN AUTO: 31.8 PG (ref 26–34)
MCHC RBC AUTO-ENTMCNC: 32.4 G/DL (ref 31–37)
MCV RBC AUTO: 98.1 FL (ref 80–100)
MONOCYTES # BLD: 10 % (ref 4–11)
NRBC AUTOMATED: NORMAL PER 100 WBC
PDW BLD-RTO: 13.2 % (ref 11–14.5)
PLATELET # BLD: 337 K/UL (ref 140–450)
PLATELET ESTIMATE: NORMAL
PMV BLD AUTO: 8.9 FL (ref 6–12)
POTASSIUM SERPL-SCNC: 4.4 MMOL/L (ref 3.7–5.3)
RBC # BLD: 4.4 M/UL (ref 4–5.2)
RBC # BLD: NORMAL 10*6/UL
SEG NEUTROPHILS: 64 % (ref 43–77)
SEGMENTED NEUTROPHILS ABSOLUTE COUNT: 4.6 K/UL (ref 1.8–7.7)
SODIUM BLD-SCNC: 143 MMOL/L (ref 135–144)
SPECIMEN DESCRIPTION: NORMAL
TOTAL PROTEIN: 7.7 G/DL (ref 6.4–8.3)
TSH SERPL DL<=0.05 MIU/L-ACNC: 1.05 MIU/L (ref 0.3–5)
WBC # BLD: 7.1 K/UL (ref 3.5–11)
WBC # BLD: NORMAL 10*3/UL

## 2019-03-16 PROCEDURE — 86140 C-REACTIVE PROTEIN: CPT

## 2019-03-16 PROCEDURE — 36415 COLL VENOUS BLD VENIPUNCTURE: CPT

## 2019-03-16 PROCEDURE — 80053 COMPREHEN METABOLIC PANEL: CPT

## 2019-03-16 PROCEDURE — 85025 COMPLETE CBC W/AUTO DIFF WBC: CPT

## 2019-03-16 PROCEDURE — 84443 ASSAY THYROID STIM HORMONE: CPT

## 2019-03-16 PROCEDURE — 82553 CREATINE MB FRACTION: CPT

## 2019-03-17 ENCOUNTER — APPOINTMENT (OUTPATIENT)
Dept: CT IMAGING | Age: 59
End: 2019-03-17
Payer: COMMERCIAL

## 2019-03-17 ENCOUNTER — APPOINTMENT (OUTPATIENT)
Dept: GENERAL RADIOLOGY | Age: 59
End: 2019-03-17
Payer: COMMERCIAL

## 2019-03-17 ENCOUNTER — HOSPITAL ENCOUNTER (EMERGENCY)
Age: 59
Discharge: HOME | End: 2019-03-17
Attending: EMERGENCY MEDICINE
Payer: COMMERCIAL

## 2019-03-17 VITALS
WEIGHT: 160 LBS | HEART RATE: 88 BPM | TEMPERATURE: 98.8 F | SYSTOLIC BLOOD PRESSURE: 144 MMHG | BODY MASS INDEX: 25.71 KG/M2 | RESPIRATION RATE: 14 BRPM | HEIGHT: 66 IN | OXYGEN SATURATION: 97 % | DIASTOLIC BLOOD PRESSURE: 78 MMHG

## 2019-03-17 DIAGNOSIS — G62.9 NEUROPATHY: ICD-10-CM

## 2019-03-17 DIAGNOSIS — R55 SYNCOPE AND COLLAPSE: Primary | ICD-10-CM

## 2019-03-17 LAB
ABSOLUTE EOS #: 0.2 K/UL (ref 0–0.4)
ABSOLUTE IMMATURE GRANULOCYTE: ABNORMAL K/UL (ref 0–0.3)
ABSOLUTE LYMPH #: 1 K/UL (ref 1–4.8)
ABSOLUTE MONO #: 0.6 K/UL (ref 0.1–1.2)
ANION GAP SERPL CALCULATED.3IONS-SCNC: 16 MMOL/L (ref 9–17)
BASOPHILS # BLD: 1 % (ref 0–1)
BASOPHILS ABSOLUTE: 0.1 K/UL (ref 0–0.2)
BUN BLDV-MCNC: 19 MG/DL (ref 6–20)
BUN/CREAT BLD: 14 (ref 9–20)
CALCIUM SERPL-MCNC: 10.1 MG/DL (ref 8.6–10.4)
CHLORIDE BLD-SCNC: 103 MMOL/L (ref 98–107)
CO2: 24 MMOL/L (ref 20–31)
CREAT SERPL-MCNC: 1.35 MG/DL (ref 0.5–0.9)
DIFFERENTIAL TYPE: ABNORMAL
EKG ATRIAL RATE: 92 BPM
EKG P AXIS: 64 DEGREES
EKG P-R INTERVAL: 134 MS
EKG Q-T INTERVAL: 388 MS
EKG QRS DURATION: 86 MS
EKG QTC CALCULATION (BAZETT): 479 MS
EKG R AXIS: 74 DEGREES
EKG T AXIS: 49 DEGREES
EKG VENTRICULAR RATE: 92 BPM
EOSINOPHILS RELATIVE PERCENT: 2 % (ref 1–7)
GFR AFRICAN AMERICAN: 49 ML/MIN
GFR NON-AFRICAN AMERICAN: 40 ML/MIN
GFR SERPL CREATININE-BSD FRML MDRD: ABNORMAL ML/MIN/{1.73_M2}
GFR SERPL CREATININE-BSD FRML MDRD: ABNORMAL ML/MIN/{1.73_M2}
GLUCOSE BLD-MCNC: 142 MG/DL (ref 70–99)
HCT VFR BLD CALC: 43.3 % (ref 36–46)
HEMOGLOBIN: 14 G/DL (ref 12–16)
IMMATURE GRANULOCYTES: ABNORMAL %
LYMPHOCYTES # BLD: 11 % (ref 16–46)
MCH RBC QN AUTO: 32.1 PG (ref 26–34)
MCHC RBC AUTO-ENTMCNC: 32.2 G/DL (ref 31–37)
MCV RBC AUTO: 99.5 FL (ref 80–100)
MONOCYTES # BLD: 7 % (ref 4–11)
NRBC AUTOMATED: ABNORMAL PER 100 WBC
PDW BLD-RTO: 13.3 % (ref 11–14.5)
PLATELET # BLD: 297 K/UL (ref 140–450)
PLATELET ESTIMATE: ABNORMAL
PMV BLD AUTO: 8.7 FL (ref 6–12)
POTASSIUM SERPL-SCNC: 4.2 MMOL/L (ref 3.7–5.3)
RBC # BLD: 4.35 M/UL (ref 4–5.2)
RBC # BLD: ABNORMAL 10*6/UL
SEG NEUTROPHILS: 79 % (ref 43–77)
SEGMENTED NEUTROPHILS ABSOLUTE COUNT: 6.9 K/UL (ref 1.8–7.7)
SODIUM BLD-SCNC: 143 MMOL/L (ref 135–144)
WBC # BLD: 8.7 K/UL (ref 3.5–11)
WBC # BLD: ABNORMAL 10*3/UL

## 2019-03-17 PROCEDURE — 70450 CT HEAD/BRAIN W/O DYE: CPT

## 2019-03-17 PROCEDURE — 99284 EMERGENCY DEPT VISIT MOD MDM: CPT

## 2019-03-17 PROCEDURE — 93005 ELECTROCARDIOGRAM TRACING: CPT

## 2019-03-17 PROCEDURE — 36415 COLL VENOUS BLD VENIPUNCTURE: CPT

## 2019-03-17 PROCEDURE — 71045 X-RAY EXAM CHEST 1 VIEW: CPT

## 2019-03-17 PROCEDURE — 2580000003 HC RX 258: Performed by: EMERGENCY MEDICINE

## 2019-03-17 PROCEDURE — 80048 BASIC METABOLIC PNL TOTAL CA: CPT

## 2019-03-17 PROCEDURE — 85025 COMPLETE CBC W/AUTO DIFF WBC: CPT

## 2019-03-17 RX ORDER — PREGABALIN 50 MG/1
50 CAPSULE ORAL 3 TIMES DAILY
Qty: 10 CAPSULE | Refills: 0 | Status: SHIPPED | OUTPATIENT
Start: 2019-03-17 | End: 2019-03-21 | Stop reason: SDUPTHER

## 2019-03-17 RX ORDER — 0.9 % SODIUM CHLORIDE 0.9 %
1000 INTRAVENOUS SOLUTION INTRAVENOUS ONCE
Status: COMPLETED | OUTPATIENT
Start: 2019-03-17 | End: 2019-03-17

## 2019-03-17 RX ADMIN — SODIUM CHLORIDE 1000 ML: 9 INJECTION, SOLUTION INTRAVENOUS at 10:28

## 2019-03-18 DIAGNOSIS — R79.89 ELEVATED SERUM CREATININE: Primary | ICD-10-CM

## 2019-03-21 ENCOUNTER — TELEPHONE (OUTPATIENT)
Dept: FAMILY MEDICINE CLINIC | Age: 59
End: 2019-03-21

## 2019-03-21 ENCOUNTER — OFFICE VISIT (OUTPATIENT)
Dept: FAMILY MEDICINE CLINIC | Age: 59
End: 2019-03-21
Payer: COMMERCIAL

## 2019-03-21 VITALS
OXYGEN SATURATION: 98 % | SYSTOLIC BLOOD PRESSURE: 110 MMHG | WEIGHT: 157 LBS | HEIGHT: 66 IN | HEART RATE: 88 BPM | BODY MASS INDEX: 25.23 KG/M2 | DIASTOLIC BLOOD PRESSURE: 70 MMHG

## 2019-03-21 DIAGNOSIS — R55 SYNCOPE, UNSPECIFIED SYNCOPE TYPE: Primary | ICD-10-CM

## 2019-03-21 DIAGNOSIS — R94.31 ABNORMAL EKG: ICD-10-CM

## 2019-03-21 DIAGNOSIS — G63 POLYNEUROPATHY ASSOCIATED WITH UNDERLYING DISEASE (HCC): ICD-10-CM

## 2019-03-21 PROCEDURE — 99213 OFFICE O/P EST LOW 20 MIN: CPT | Performed by: NURSE PRACTITIONER

## 2019-03-21 RX ORDER — PREGABALIN 50 MG/1
50 CAPSULE ORAL 3 TIMES DAILY
Qty: 90 CAPSULE | Refills: 2 | Status: SHIPPED | OUTPATIENT
Start: 2019-03-21 | End: 2019-04-30 | Stop reason: ALTCHOICE

## 2019-03-21 ASSESSMENT — ENCOUNTER SYMPTOMS
SHORTNESS OF BREATH: 0
COUGH: 0
WHEEZING: 0

## 2019-03-25 ENCOUNTER — TELEPHONE (OUTPATIENT)
Dept: FAMILY MEDICINE CLINIC | Age: 59
End: 2019-03-25

## 2019-03-25 DIAGNOSIS — G63 POLYNEUROPATHY ASSOCIATED WITH UNDERLYING DISEASE (HCC): ICD-10-CM

## 2019-04-03 ENCOUNTER — OFFICE VISIT (OUTPATIENT)
Dept: CARDIOLOGY | Age: 59
End: 2019-04-03
Payer: COMMERCIAL

## 2019-04-03 VITALS
SYSTOLIC BLOOD PRESSURE: 114 MMHG | HEIGHT: 66 IN | BODY MASS INDEX: 25.23 KG/M2 | DIASTOLIC BLOOD PRESSURE: 68 MMHG | WEIGHT: 157 LBS | HEART RATE: 80 BPM

## 2019-04-03 DIAGNOSIS — R55 SYNCOPE, UNSPECIFIED SYNCOPE TYPE: Primary | ICD-10-CM

## 2019-04-03 PROCEDURE — 99244 OFF/OP CNSLTJ NEW/EST MOD 40: CPT | Performed by: INTERNAL MEDICINE

## 2019-04-03 NOTE — PROGRESS NOTES
Today's Date: 4/3/2019  Patient's Name: Arpit Brooks  Patient's age: 64 y. o., 1960    Subjective:  Arpit Brooks  is here to establish cardiology care. She had an episode of fainting by seizure which was witnessed by her . She denies tongue bitting or incontinence. She was taking gabapentin and was weaning herself off. She denies any recurrence. No chest pain, no dyspnea, no PND, no syncope or pre-syncope, no orthopnea. Past Medical History:   has a past medical history of Cervicogenic headache, Chronic sinusitis, Conductive hearing loss, Degenerative arthritis of cervical spine, Depression, Elevated fasting glucose, Epigastric abdominal pain, GERD (gastroesophageal reflux disease), History of cervical dysplasia, History of methamphetamine abuse, LSIL (low grade squamous intraepithelial lesion) on Pap smear, and Tobacco abuse. Past Surgical History:   has a past surgical history that includes Upper gastrointestinal endoscopy (05/02/2013); other surgical history (03/10/1987); Cervix lesion destruction; Cataract removal with implant (Left, 3/22/2016); Cataract removal (Bilateral); and Colonoscopy (03/03/2017). Home Medications:  Prior to Admission medications    Medication Sig Start Date End Date Taking? Authorizing Provider   OLANZapine (ZYPREXA) 10 MG tablet  1/25/19  Yes Historical Provider, MD   FLUoxetine (PROZAC) 20 MG capsule Take 60 mg by mouth daily   Yes Historical Provider, MD   Exenatide (BYDUREON) 2 MG PEN Inject 1 pen into the skin once a week 1/4/19  Yes SAEED Jensen CNP   lisinopril (PRINIVIL;ZESTRIL) 40 MG tablet Take 1 tablet by mouth daily 1/4/19  Yes SAEED Jensen CNP   buPROPion (WELLBUTRIN SR) 150 MG extended release tablet Take 1 tablet by mouth 2 times daily Start 150mg daily for 3 day then increase to BID dosing.  1/4/19  Yes SAEED Jensen CNP   atorvastatin (LIPITOR) 10 MG tablet TAKE 1 TABLET DAILY 1/4/19 Yes SAEED Diggs CNP   Blood Pressure Monitor CARY Monitor BP daily; Dx. Hypertension (I10) 8/14/18  Yes Rodolfo Meng MD   omeprazole (PRILOSEC) 20 MG delayed release capsule Take 1 capsule by mouth daily 6/14/18  Yes SAEED Diggs CNP   ondansetron (ZOFRAN ODT) 4 MG disintegrating tablet Take 1 tablet by mouth every 8 hours as needed for Nausea 4/11/18  Yes Anuja Banks MD   Insulin Pen Needle ([place] PEN NEEDLES) 31G X 6 MM MISC 1 each by Does not apply route daily 2/9/18  Yes SAEED Diggs CNP   Glucose Blood (BLOOD GLUCOSE TEST STRIPS) STRP Diagnosis 250.90, tests once daily 12/8/16  Yes SAEED Diggs CNP   Lancets MISC Diagnosis 250.90, tests once daily 12/8/16  Yes SAEED Diggs CNP   lamoTRIgine (LAMICTAL) 200 MG tablet Take 200 mg by mouth daily 10/26/16  Yes Historical Provider, MD   Blood Glucose Monitoring Suppl [facility] Diagnosis 250.90, tests once daily 3/3/16  Yes SAEED Diggs CNP   Cholecalciferol (VITAMIN D PO) Take by mouth   Yes Historical Provider, MD   SAPHRIS 10 MG SUBL sublingual tablet nightly  9/26/15  Yes Historical Provider, MD   Multiple Vitamins-Minerals (MULTIVITAMIN PO) Take  by mouth daily. Yes Historical Provider, MD   pregabalin (LYRICA) 50 MG capsule Take 1 capsule by mouth 3 times daily for 3 days. 3/21/19 3/24/19  SAEED Diggs CNP       Allergies:  Gabapentin; Ciprofloxacin; and Keflex [cephalexin]    Social History:   reports that she has been smoking. She has a 10.00 pack-year smoking history. She has never used smokeless tobacco. She reports that she drinks alcohol. She reports that she has current or past drug history. Drugs: Opiates  and Marijuana. ROS: Otherwise 10 systems reviewed and negative.     /68   Pulse 80   Ht 5' 6\" (1.676 m)   Wt 157 lb (71.2 kg)   LMP 08/28/2016   BMI 25.34 kg/m²     Vitals as above. Alert and oriented x 3. No JVD, or carotid bruits. Lungs are clear to auscultation. Heart sounds are regular, normal, no murmur. Abdomen is soft, no tenderness. Extremities No peripheral edema    Cardiac Data:  EKG: NSR, possible LAE    Labs:     CBC: No results for input(s): WBC, HGB, HCT, PLT in the last 72 hours. BMP: No results for input(s): NA, K, CO2, BUN, CREATININE, LABGLOM, GLUCOSE in the last 72 hours. PT/INR: No results for input(s): PROTIME, INR in the last 72 hours. FASTING LIPID PANEL:  Lab Results   Component Value Date    HDL 78 12/11/2018    TRIG 111 12/11/2018     LIVER PROFILE:No results for input(s): AST, ALT, LABALBU in the last 72 hours. Assessment and plan:    -Syncope and seizure- Possibly related to gabapentin withdrawal. Patient also on multiple psych medication. ECG in ER showed normal QTC. Obtain TTE. Patient has been referred to neurology. Patient to also discuss medication with psychiatrist.   -HTN- failry well controlled at this time. Continue current therapy.   -Hyperlipidemia- continue statin. LDL 77 on 12/11/18  -Depression- on multiple medication and sees psychiatrist  -Chronic smoking- Counseled to quit smoking. -RTC 6 months.     Sherrie Westfall [id number]           [phone]

## 2019-04-05 ENCOUNTER — HOSPITAL ENCOUNTER (OUTPATIENT)
Dept: NON INVASIVE DIAGNOSTICS | Age: 59
Discharge: HOME | End: 2019-04-05
Payer: COMMERCIAL

## 2019-04-05 DIAGNOSIS — R94.31 ABNORMAL EKG: ICD-10-CM

## 2019-04-05 DIAGNOSIS — R55 SYNCOPE, UNSPECIFIED SYNCOPE TYPE: ICD-10-CM

## 2019-04-05 LAB
LV EF: 55 %
LVEF MODALITY: NORMAL

## 2019-04-05 PROCEDURE — 93306 TTE W/DOPPLER COMPLETE: CPT

## 2019-04-30 ENCOUNTER — HOSPITAL ENCOUNTER (OUTPATIENT)
Dept: LAB | Age: 59
Discharge: HOME | End: 2019-04-30
Payer: COMMERCIAL

## 2019-04-30 ENCOUNTER — OFFICE VISIT (OUTPATIENT)
Dept: FAMILY MEDICINE CLINIC | Age: 59
End: 2019-04-30
Payer: COMMERCIAL

## 2019-04-30 VITALS
BODY MASS INDEX: 25.39 KG/M2 | DIASTOLIC BLOOD PRESSURE: 88 MMHG | SYSTOLIC BLOOD PRESSURE: 138 MMHG | TEMPERATURE: 97.6 F | HEIGHT: 66 IN | HEART RATE: 88 BPM | WEIGHT: 158 LBS | OXYGEN SATURATION: 99 %

## 2019-04-30 DIAGNOSIS — R39.198 DIFFICULTY URINATING: ICD-10-CM

## 2019-04-30 DIAGNOSIS — N30.00 ACUTE CYSTITIS WITHOUT HEMATURIA: Primary | ICD-10-CM

## 2019-04-30 DIAGNOSIS — N30.00 ACUTE CYSTITIS WITHOUT HEMATURIA: ICD-10-CM

## 2019-04-30 DIAGNOSIS — R39.198 DIFFICULTY URINATING: Primary | ICD-10-CM

## 2019-04-30 LAB
-: ABNORMAL
AMORPHOUS: ABNORMAL
BACTERIA: ABNORMAL
BILIRUBIN URINE: NEGATIVE
CASTS UA: ABNORMAL /LPF (ref 0–2)
COLOR: ABNORMAL
COMMENT UA: ABNORMAL
CRYSTALS, UA: ABNORMAL /HPF
EPITHELIAL CELLS UA: ABNORMAL /HPF (ref 0–5)
GLUCOSE URINE: NEGATIVE
KETONES, URINE: NEGATIVE
LEUKOCYTE ESTERASE, URINE: ABNORMAL
MUCUS: ABNORMAL
NITRITE, URINE: NEGATIVE
OTHER OBSERVATIONS UA: ABNORMAL
PH UA: 6 (ref 5–6)
PROTEIN UA: NEGATIVE
RBC UA: ABNORMAL /HPF (ref 0–4)
RENAL EPITHELIAL, UA: ABNORMAL /HPF
SPECIFIC GRAVITY UA: 1.03 (ref 1.01–1.02)
TRICHOMONAS: ABNORMAL
TURBIDITY: ABNORMAL
URINE HGB: NEGATIVE
UROBILINOGEN, URINE: NORMAL
WBC UA: ABNORMAL /HPF (ref 0–4)
YEAST: ABNORMAL

## 2019-04-30 PROCEDURE — 99213 OFFICE O/P EST LOW 20 MIN: CPT | Performed by: NURSE PRACTITIONER

## 2019-04-30 PROCEDURE — 87086 URINE CULTURE/COLONY COUNT: CPT

## 2019-04-30 PROCEDURE — 81001 URINALYSIS AUTO W/SCOPE: CPT

## 2019-04-30 RX ORDER — NITROFURANTOIN 25; 75 MG/1; MG/1
100 CAPSULE ORAL 2 TIMES DAILY
Qty: 14 CAPSULE | Refills: 0 | Status: SHIPPED | OUTPATIENT
Start: 2019-04-30 | End: 2019-05-07

## 2019-04-30 ASSESSMENT — ENCOUNTER SYMPTOMS
COUGH: 0
SHORTNESS OF BREATH: 0
WHEEZING: 0

## 2019-04-30 NOTE — PROGRESS NOTES
Subjective:      Patient ID: Gera Issa is a 64 y.o. female. Pt presents to the clinic to discuss difficulty urinating. Pt states that it started a few days ago that she would get up in the middle of the night and feel like she couldn't urinate. Pt is not having any trouble during the day. Pt has had some urgency in the day but does not feel that she has to \"push\" as much as she does at night to urinate. She is tearful in the office stating that she wants to see a urologist.     Past Medical History:   Diagnosis Date    Cervicogenic headache     Chronic sinusitis     Conductive hearing loss     Degenerative arthritis of cervical spine     Depression     Elevated fasting glucose     Epigastric abdominal pain     GERD (gastroesophageal reflux disease)     History of cervical dysplasia 1987    LASER    History of methamphetamine abuse     (Quit 6-2001)    LSIL (low grade squamous intraepithelial lesion) on Pap smear 2002    Cryo    Tobacco abuse        Past Surgical History:   Procedure Laterality Date    CATARACT REMOVAL Bilateral     CATARACT REMOVAL WITH IMPLANT Left 3/22/2016    Dr. Tee Caban, [address] COLONOSCOPY  03/03/2017    [facility]    Laser conization of cervix (Hx of moderate dysplasia on Pap test).  UPPER GASTROINTESTINAL ENDOSCOPY  05/02/2013    Mild gastritis.        Social History     Socioeconomic History    Marital status:      Spouse name: None    Number of children: None    Years of education: None    Highest education level: None   Occupational History    None   Social Needs    Financial resource strain: None    Food insecurity:     Worry: None     Inability: None    Transportation needs:     Medical: None     Non-medical: None   Tobacco Use    Smoking status: Current Some Day Smoker     Packs/day: 0.25     Years: 40.00     Pack years: 10.00     Last attempt to quit: 2017     Years since quittin.4    Smokeless tobacco: Never Used   Substance and Sexual Activity    Alcohol use: Yes     Alcohol/week: 0.0 oz     Comment: socially    Drug use: Yes     Types: Opiates , Marijuana     Comment: Quit . Followed up at the recovery center.  Sexual activity: Yes     Partners: Male     Birth control/protection: Post-menopausal     Comment: Menarche age 17  Menopause age 49   Lifestyle    Physical activity:     Days per week: None     Minutes per session: None    Stress: None   Relationships    Social connections:     Talks on phone: None     Gets together: None     Attends Voodoo service: None     Active member of club or organization: None     Attends meetings of clubs or organizations: None     Relationship status: None    Intimate partner violence:     Fear of current or ex partner: None     Emotionally abused: None     Physically abused: None     Forced sexual activity: None   Other Topics Concern    None   Social History Narrative    None       Family History   Problem Relation Age of Onset    Diabetes Mother           general health deterioration after falling    Osteoporosis Mother     High Cholesterol Father     Lung Cancer Father          age 61    Diabetes Other     Diabetes Other     Diabetes Brother        Allergies   Allergen Reactions    Gabapentin Anaphylaxis    Lyrica [Pregabalin]     Ciprofloxacin Other (See Comments)     Stomach pain    Keflex [Cephalexin] Other (See Comments)     Stomach pain       Current Outpatient Medications   Medication Sig Dispense Refill    nitrofurantoin, macrocrystal-monohydrate, (MACROBID) 100 MG capsule Take 1 capsule by mouth 2 times daily for 7 days Take with food.  14 capsule 0    OLANZapine (ZYPREXA) 10 MG tablet   0    FLUoxetine (PROZAC) 20 MG capsule Take 60 mg by mouth daily      Exenatide (BYDUREON) 2 MG PEN Inject 1 pen into the skin once a week 12 pen 3    lisinopril (PRINIVIL;ZESTRIL) 40 MG tablet Take 1 tablet by mouth daily 90 tablet 1    buPROPion (WELLBUTRIN SR) 150 MG extended release tablet Take 1 tablet by mouth 2 times daily Start 150mg daily for 3 day then increase to BID dosing. 60 tablet 3    atorvastatin (LIPITOR) 10 MG tablet TAKE 1 TABLET DAILY 90 tablet 1    Blood Pressure Monitor CARY Monitor BP daily; Dx. Hypertension (I10) 1 Device 0    omeprazole (PRILOSEC) 20 MG delayed release capsule Take 1 capsule by mouth daily 90 capsule 1    ondansetron (ZOFRAN ODT) 4 MG disintegrating tablet Take 1 tablet by mouth every 8 hours as needed for Nausea 20 tablet 0    Insulin Pen Needle ([employer] PEN NEEDLES) 31G X 6 MM MISC 1 each by Does not apply route daily 100 each 3    Glucose Blood (BLOOD GLUCOSE TEST STRIPS) STRP Diagnosis 250.90, tests once daily 50 strip 11    Lancets MISC Diagnosis 250.90, tests once daily 50 each 11    lamoTRIgine (LAMICTAL) 200 MG tablet Take 200 mg by mouth daily      Blood Glucose Monitoring Suppl CARY Diagnosis 250.90, tests once daily 1 Device 0    Cholecalciferol (VITAMIN D PO) Take by mouth      SAPHRIS 10 MG SUBL sublingual tablet nightly       Multiple Vitamins-Minerals (MULTIVITAMIN PO) Take  by mouth daily. No current facility-administered medications for this visit. Review of Systems   Constitutional: Negative for activity change, appetite change and fever. Respiratory: Negative for cough, shortness of breath and wheezing. Cardiovascular: Negative for chest pain and palpitations. Genitourinary: Positive for difficulty urinating. Negative for dysuria, frequency and urgency. Objective:   Physical Exam   Constitutional: She is oriented to person, place, and time. She appears well-developed and well-nourished. No distress. HENT:   Head: Normocephalic and atraumatic. Neck: Neck supple. Cardiovascular: Normal rate, regular rhythm and normal heart sounds.    Pulmonary/Chest:  Bun/Cre Ratio 03/17/2019 14  9 - 20 Final    Calcium 03/17/2019 10.1  8.6 - 10.4 mg/dL Final    Sodium 03/17/2019 143  135 - 144 mmol/L Final    Potassium 03/17/2019 4.2  3.7 - 5.3 mmol/L Final    Chloride 03/17/2019 103  98 - 107 mmol/L Final    CO2 03/17/2019 24  20 - 31 mmol/L Final    Anion Gap 03/17/2019 16  9 - 17 mmol/L Final    GFR Non- 03/17/2019 40* >60 mL/min Final    GFR  03/17/2019 49* >60 mL/min Final    GFR Comment 03/17/2019        Final    Comment: Average GFR for 52-63 years old:   80 mL/min/1.73sq m  Chronic Kidney Disease:   <60 mL/min/1.73sq m  Kidney failure:   <15 mL/min/1.73sq m              eGFR calculated using average adult body mass.  Additional eGFR calculator available at:        [email or web address]            GFR Staging 03/17/2019 NOT REPORTED   Final    WBC 03/17/2019 8.7  3.5 - 11.0 k/uL Final    RBC 03/17/2019 4.35  4.0 - 5.2 m/uL Final    Hemoglobin 03/17/2019 14.0  12.0 - 16.0 g/dL Final    Hematocrit 03/17/2019 43.3  36 - 46 % Final    MCV 03/17/2019 99.5  80 - 100 fL Final    MCH 03/17/2019 32.1  26 - 34 pg Final    MCHC 03/17/2019 32.2  31 - 37 g/dL Final    RDW 03/17/2019 13.3  11.0 - 14.5 % Final    Platelets 24/19/1212 297  140 - 450 k/uL Final    MPV 03/17/2019 8.7  6.0 - 12.0 fL Final    NRBC Automated 03/17/2019 NOT REPORTED  per 100 WBC Final    Differential Type 03/17/2019 NOT REPORTED   Final    Immature Granulocytes 03/17/2019 NOT REPORTED  0 % Final    Absolute Immature Granulocyte 03/17/2019 NOT REPORTED  0.00 - 0.30 k/uL Final    WBC Morphology 03/17/2019 NOT REPORTED   Final    RBC Morphology 03/17/2019 NOT REPORTED   Final    Platelet Estimate 09/65/7409 NOT REPORTED   Final    Seg Neutrophils 03/17/2019 79* 43 - 77 % Final    Lymphocytes 03/17/2019 11* 16 - 46 % Final    Monocytes 03/17/2019 7  4 - 11 % Final    Eosinophils % 03/17/2019 2  1 - 7 % Final    Basophils 03/17/2019 1  0 - 1 % Final    Segs Absolute 03/17/2019 6.90  1.8 - 7.7 k/uL Final    Absolute Lymph # 03/17/2019 1.00  1.0 - 4.8 k/uL Final    Absolute Mono # 03/17/2019 0.60  0.1 - 1.2 k/uL Final    Absolute Eos # 03/17/2019 0.20  0.0 - 0.4 k/uL Final    Basophils # 03/17/2019 0.10  0.0 - 0.2 k/uL Final    Ventricular Rate 03/17/2019 92  BPM Final    Atrial Rate 03/17/2019 92  BPM Final    P-R Interval 03/17/2019 134  ms Final    QRS Duration 03/17/2019 86  ms Final    Q-T Interval 03/17/2019 388  ms Final    QTc Calculation (Bazett) 03/17/2019 479  ms Final    P Axis 03/17/2019 64  degrees Final    R Axis 03/17/2019 74  degrees Final    T Axis 03/17/2019 49  degrees Final   Hospital Outpatient Visit on 03/16/2019   Component Date Value Ref Range Status    CRP 03/16/2019 2.8  0.0 - 5.0 mg/L Final    CK-MB 03/16/2019 1.9  <5.4 ng/mL Final    TSH 03/16/2019 1.05  0.30 - 5.00 mIU/L Final    Glucose 03/16/2019 102* 70 - 99 mg/dL Final    BUN 03/16/2019 11  6 - 20 mg/dL Final    CREATININE 03/16/2019 1.15* 0.50 - 0.90 mg/dL Final    Bun/Cre Ratio 03/16/2019 10  9 - 20 Final    Calcium 03/16/2019 10.3  8.6 - 10.4 mg/dL Final    Sodium 03/16/2019 143  135 - 144 mmol/L Final    Potassium 03/16/2019 4.4  3.7 - 5.3 mmol/L Final    Chloride 03/16/2019 101  98 - 107 mmol/L Final    CO2 03/16/2019 28  20 - 31 mmol/L Final    Anion Gap 03/16/2019 14  9 - 17 mmol/L Final    Alkaline Phosphatase 03/16/2019 142* 35 - 104 U/L Final    ALT 03/16/2019 44* 5 - 33 U/L Final    AST 03/16/2019 40* <32 U/L Final    Total Bilirubin 03/16/2019 0.29* 0.3 - 1.2 mg/dL Final    Total Protein 03/16/2019 7.7  6.4 - 8.3 g/dL Final    Alb 03/16/2019 4.9  3.5 - 5.2 g/dL Final    Albumin/Globulin Ratio 03/16/2019 1.8  1.0 - 2.5 Final    GFR Non- 03/16/2019 49* >60 mL/min Final    GFR  03/16/2019 59* >60 mL/min Final    GFR Comment 03/16/2019        Final    Comment: Average GFR for NEGATIVE  NEGATIVE Final    Bilirubin Urine 03/15/2019 NEGATIVE  NEGATIVE Final    Ketones, Urine 03/15/2019 NEGATIVE  NEGATIVE Final    Specific Gravity, UA 03/15/2019 1.010  1.010 - 1.025 Final    Urine Hgb 03/15/2019 NEGATIVE  NEGATIVE Final    pH, UA 03/15/2019 7.0* 5.0 - 6.0 Final    Protein, UA 03/15/2019 NEGATIVE  NEGATIVE Final    Urobilinogen, Urine 03/15/2019 Normal  Normal Final    Nitrite, Urine 03/15/2019 NEGATIVE  NEGATIVE Final    Leukocyte Esterase, Urine 03/15/2019 3+* NEGATIVE Final    Urinalysis Comments 03/15/2019 NOT REPORTED   Final    - 03/15/2019        Final    WBC, UA 03/15/2019 5 TO 10  0 - 4 /HPF Final    RBC, UA 03/15/2019 None  0 - 4 /HPF Final    Casts UA 03/15/2019 NOT REPORTED  0 - 2 /LPF Final    Crystals UA 03/15/2019 NOT REPORTED  None /HPF Final    Epithelial Cells UA 03/15/2019 0 TO 4  0 - 5 /HPF Final    Renal Epithelial, Urine 03/15/2019 NOT REPORTED  0 /HPF Final    Bacteria, UA 03/15/2019 1+* None Final    Mucus, UA 03/15/2019 NOT REPORTED  None Final    Trichomonas, UA 03/15/2019 NOT REPORTED  None Final    Amorphous, UA 03/15/2019 NOT REPORTED  None Final    Other Observations UA 03/15/2019 Specimen Cultured* NOT REQ. Final    Yeast, UA 03/15/2019 NOT REPORTED  None Final    Specimen Description 03/15/2019 . CLEAN CATCH URINE   Final    Special Requests 03/15/2019 NOT REPORTED   Final    Culture 03/15/2019 NO SIGNIFICANT GROWTH   Final       Assessment:       Diagnosis Orders   1. Acute cystitis without hematuria  Urine Culture   2. Difficulty urinating             Plan:       Will add a culture to her urine  Will treat with macrobid 100mg BID for 7 days  She is to push fluids  If no improvement after treatment will consider referral to urology   Pt to return If symptoms do not improve or worsen   Return HARISHN         Chely Antoine, APRN - CNP

## 2019-05-01 LAB
CULTURE: NORMAL
Lab: NORMAL
SPECIMEN DESCRIPTION: NORMAL

## 2019-05-02 ENCOUNTER — TELEPHONE (OUTPATIENT)
Dept: FAMILY MEDICINE CLINIC | Age: 59
End: 2019-05-02

## 2019-05-02 NOTE — TELEPHONE ENCOUNTER
Spoke with patient and advised patient of urine culture results. Patient states she is feeling a little better and does not have to push as hard to urinate at night. Patient will call us if things have not improved after antibiotic is finished.

## 2019-05-02 NOTE — TELEPHONE ENCOUNTER
----- Message from SAEED Thomas CNP sent at 5/1/2019 10:05 PM EDT -----  No significant growth on the urine culture. How is the patient feeling?

## 2019-05-06 ENCOUNTER — TELEPHONE (OUTPATIENT)
Dept: FAMILY MEDICINE CLINIC | Age: 59
End: 2019-05-06

## 2019-05-06 DIAGNOSIS — R39.198 DIFFICULTY URINATING: Primary | ICD-10-CM

## 2019-05-06 NOTE — TELEPHONE ENCOUNTER
Attempt was made to reach patient x 1 to schedule an appointment with the Urologist. Message was left for patient

## 2019-05-20 ENCOUNTER — TELEPHONE (OUTPATIENT)
Dept: FAMILY MEDICINE CLINIC | Age: 59
End: 2019-05-20

## 2019-05-20 ENCOUNTER — OFFICE VISIT (OUTPATIENT)
Dept: UROLOGY | Age: 59
End: 2019-05-20
Payer: COMMERCIAL

## 2019-05-20 VITALS
HEIGHT: 66 IN | SYSTOLIC BLOOD PRESSURE: 140 MMHG | DIASTOLIC BLOOD PRESSURE: 98 MMHG | OXYGEN SATURATION: 96 % | HEART RATE: 88 BPM | WEIGHT: 162.4 LBS | BODY MASS INDEX: 26.1 KG/M2

## 2019-05-20 DIAGNOSIS — R30.0 DYSURIA: Primary | ICD-10-CM

## 2019-05-20 DIAGNOSIS — R39.198 DIFFICULTY URINATING: ICD-10-CM

## 2019-05-20 PROCEDURE — 99213 OFFICE O/P EST LOW 20 MIN: CPT | Performed by: UROLOGY

## 2019-05-20 PROCEDURE — 51798 US URINE CAPACITY MEASURE: CPT | Performed by: UROLOGY

## 2019-05-20 NOTE — PROGRESS NOTES
 Epigastric abdominal pain     GERD (gastroesophageal reflux disease)     History of cervical dysplasia     LASER    History of methamphetamine abuse     (Quit )    LSIL (low grade squamous intraepithelial lesion) on Pap smear     Cryo    Tobacco abuse      Past Surgical History:   Procedure Laterality Date    CATARACT REMOVAL Bilateral     CATARACT REMOVAL WITH IMPLANT Left 3/22/2016    Dr. Roberto Plaza, [address] COLONOSCOPY  2017    Dr. Veronika Osman, [facility]  03/10/1987    Laser conization of cervix (Hx of moderate dysplasia on Pap test).  UPPER GASTROINTESTINAL ENDOSCOPY  2013    Mild gastritis. Family History   Problem Relation Age of Onset    Diabetes Mother          200 general health deterioration after falling    Osteoporosis Mother     High Cholesterol Father     Lung Cancer Father          age 61    Diabetes Other     Diabetes Other     Diabetes Brother      Outpatient Medications Marked as Taking for the 19 encounter (Office Visit) with Rowena Toscano MD   Medication Sig Dispense Refill    OLANZapine (ZYPREXA) 10 MG tablet   0    FLUoxetine (PROZAC) 20 MG capsule Take 60 mg by mouth daily      Exenatide (BYDUREON) 2 MG PEN Inject 1 pen into the skin once a week 12 pen 3    lisinopril (PRINIVIL;ZESTRIL) 40 MG tablet Take 1 tablet by mouth daily 90 tablet 1    buPROPion (WELLBUTRIN SR) 150 MG extended release tablet Take 1 tablet by mouth 2 times daily Start 150mg daily for 3 day then increase to BID dosing. 60 tablet 3    atorvastatin (LIPITOR) 10 MG tablet TAKE 1 TABLET DAILY 90 tablet 1    Blood Pressure Monitor CARY Monitor BP daily; Dx.  Hypertension (I10) 1 Device 0    omeprazole (PRILOSEC) 20 MG delayed release capsule Take 1 capsule by mouth daily 90 capsule 1    ondansetron (ZOFRAN ODT) 4 MG disintegrating tablet Take 1 tablet by mouth every 8 hours as needed for Nausea 20 tablet 0    Insulin Pen Needle ([employer] PEN NEEDLES) 31G X 6 MM MISC 1 each by Does not apply route daily 100 each 3    Glucose Blood (BLOOD GLUCOSE TEST STRIPS) STRP Diagnosis 250.90, tests once daily 50 strip 11    Lancets MISC Diagnosis 250.90, tests once daily 50 each 11    lamoTRIgine (LAMICTAL) 200 MG tablet Take 200 mg by mouth daily      Blood Glucose Monitoring Suppl CARY Diagnosis 250.90, tests once daily 1 Device 0    Cholecalciferol (VITAMIN D PO) Take by mouth      SAPHRIS 10 MG SUBL sublingual tablet nightly       Multiple Vitamins-Minerals (MULTIVITAMIN PO) Take  by mouth daily. Gabapentin; Lyrica [pregabalin]; Ciprofloxacin; and Keflex [cephalexin]  Social History     Tobacco Use   Smoking Status Current Some Day Smoker    Packs/day: 0.25    Years: 40.00    Pack years: 10.00    Last attempt to quit: 2017    Years since quittin.5   Smokeless Tobacco Never Used      (If patient a smoker, smoking cessation counseling offered)   Social History     Substance and Sexual Activity   Alcohol Use Yes    Alcohol/week: 0.0 oz    Comment: socially       REVIEW OF SYSTEMS:  Constitutional: negative  Eyes: negative  Respiratory: negative  Cardiovascular: negative  Gastrointestinal: negative  Genitourinary: see HPI  Musculoskeletal: negative  Skin: negative   Neurological: negative  Hematological/Lymphatic: negative  Psychological: negative        Physical Exam:    This a 64 y.o. female  Vitals:    19 1027   BP: (!) 140/98   Pulse: 88   SpO2: 96%     Body mass index is 26.22 kg/m². Constitutional: Patient in no acute distress; Neuro: alert and oriented to person place and time. Psych: Mood and affect normal.  Skin: warm, dry  Lungs: Respiratory effort normal, Symmetric chest rise  Cardiovascular:  Normal peripheral pulses; Regular rate. Abdomen: Soft, non-tender, non-distended with no CVA, flank pain, hepatosplenomegaly or hernia. Kidneys normal.  Bladder non-tender and not distended. Lymphatics: no palpable lymphadenopathy  Minimal/no edema in bilateral lower extremities          Assessment and Plan        1. Dysuria    2. Difficulty urinating               Plan:        Urinalysis negative  No evidence of infection  Post void residual zero  No acute urologic issues at this time  Follow up PRN        Prescriptions Ordered:  No orders of the defined types were placed in this encounter.      Orders Placed:  Orders Placed This Encounter   Procedures   Burgess Amrik MD

## 2019-05-20 NOTE — TELEPHONE ENCOUNTER
Pt calling stating that she was taken off the hydrochlorothiazide but when she was at the clinic for an appointment he blood pressure was high. Pt wants to know if she should go back on this medication. Please call pt.

## 2019-05-21 ENCOUNTER — NURSE ONLY (OUTPATIENT)
Dept: LAB | Age: 59
End: 2019-05-21

## 2019-05-21 VITALS — SYSTOLIC BLOOD PRESSURE: 160 MMHG | DIASTOLIC BLOOD PRESSURE: 98 MMHG | HEART RATE: 90 BPM

## 2019-05-21 DIAGNOSIS — Z01.30 BP CHECK: Primary | ICD-10-CM

## 2019-05-21 NOTE — TELEPHONE ENCOUNTER
Spoke with Delbert Smith - informed her we should have some more BP readings first. Advised her to stop at [address] then to Injection Room for some random BP checks. Hours of operation given. Also suggested she may want to purchase a BP monitor for home use.

## 2019-05-21 NOTE — PROGRESS NOTES
Pt to injection room for BP check. Rested 15 mins. Prior to reading. /98 pulse 90 pt took all medications today.     Retake: 160/98  Patient does not have any s/s of high BP

## 2019-05-21 NOTE — TELEPHONE ENCOUNTER
Have her monitor her BP at home and call next week with readings.  Or she can come to the clinic and have her blood pressure taken as well

## 2019-05-22 ENCOUNTER — NURSE ONLY (OUTPATIENT)
Dept: LAB | Age: 59
End: 2019-05-22

## 2019-05-22 VITALS — SYSTOLIC BLOOD PRESSURE: 140 MMHG | DIASTOLIC BLOOD PRESSURE: 88 MMHG

## 2019-05-22 DIAGNOSIS — Z01.30 BP CHECK: Primary | ICD-10-CM

## 2019-06-05 ENCOUNTER — NURSE ONLY (OUTPATIENT)
Dept: LAB | Age: 59
End: 2019-06-05

## 2019-06-05 VITALS — DIASTOLIC BLOOD PRESSURE: 96 MMHG | HEART RATE: 80 BPM | SYSTOLIC BLOOD PRESSURE: 142 MMHG

## 2019-06-05 DIAGNOSIS — Z01.30 BP CHECK: Primary | ICD-10-CM

## 2019-06-05 RX ORDER — BUPROPION HYDROCHLORIDE 150 MG/1
150 TABLET, EXTENDED RELEASE ORAL 2 TIMES DAILY
Qty: 180 TABLET | Refills: 1 | Status: SHIPPED | OUTPATIENT
Start: 2019-06-05 | End: 2019-12-23

## 2019-06-07 ENCOUNTER — NURSE ONLY (OUTPATIENT)
Dept: LAB | Age: 59
End: 2019-06-07

## 2019-06-07 VITALS — HEART RATE: 90 BPM | SYSTOLIC BLOOD PRESSURE: 160 MMHG | DIASTOLIC BLOOD PRESSURE: 98 MMHG

## 2019-06-07 DIAGNOSIS — Z01.30 BP CHECK: Primary | ICD-10-CM

## 2019-06-24 ENCOUNTER — OFFICE VISIT (OUTPATIENT)
Dept: FAMILY MEDICINE CLINIC | Age: 59
End: 2019-06-24
Payer: COMMERCIAL

## 2019-06-24 VITALS
HEART RATE: 88 BPM | HEIGHT: 66 IN | WEIGHT: 161 LBS | DIASTOLIC BLOOD PRESSURE: 78 MMHG | OXYGEN SATURATION: 98 % | SYSTOLIC BLOOD PRESSURE: 130 MMHG | BODY MASS INDEX: 25.88 KG/M2

## 2019-06-24 DIAGNOSIS — I10 ESSENTIAL HYPERTENSION: Primary | ICD-10-CM

## 2019-06-24 PROCEDURE — 99213 OFFICE O/P EST LOW 20 MIN: CPT | Performed by: NURSE PRACTITIONER

## 2019-06-24 RX ORDER — BLOOD PRESSURE TEST KIT
KIT MISCELLANEOUS
Qty: 1 KIT | Refills: 0 | Status: SHIPPED | OUTPATIENT
Start: 2019-06-24

## 2019-06-24 ASSESSMENT — ENCOUNTER SYMPTOMS
WHEEZING: 0
SHORTNESS OF BREATH: 0
COUGH: 0

## 2019-07-11 ENCOUNTER — OFFICE VISIT (OUTPATIENT)
Dept: FAMILY MEDICINE CLINIC | Age: 59
End: 2019-07-11
Payer: COMMERCIAL

## 2019-07-11 ENCOUNTER — HOSPITAL ENCOUNTER (OUTPATIENT)
Dept: LAB | Age: 59
Discharge: HOME | End: 2019-07-11
Payer: COMMERCIAL

## 2019-07-11 VITALS
SYSTOLIC BLOOD PRESSURE: 150 MMHG | HEART RATE: 78 BPM | OXYGEN SATURATION: 99 % | BODY MASS INDEX: 26.36 KG/M2 | WEIGHT: 164 LBS | DIASTOLIC BLOOD PRESSURE: 90 MMHG | HEIGHT: 66 IN

## 2019-07-11 DIAGNOSIS — R79.89 ELEVATED SERUM CREATININE: ICD-10-CM

## 2019-07-11 DIAGNOSIS — I10 ESSENTIAL HYPERTENSION: ICD-10-CM

## 2019-07-11 DIAGNOSIS — E78.2 MIXED HYPERLIPIDEMIA: ICD-10-CM

## 2019-07-11 DIAGNOSIS — K21.9 GASTROESOPHAGEAL REFLUX DISEASE WITHOUT ESOPHAGITIS: ICD-10-CM

## 2019-07-11 DIAGNOSIS — E11.9 TYPE 2 DIABETES MELLITUS WITHOUT COMPLICATION, UNSPECIFIED WHETHER LONG TERM INSULIN USE (HCC): ICD-10-CM

## 2019-07-11 DIAGNOSIS — E11.9 TYPE 2 DIABETES MELLITUS WITHOUT COMPLICATION, UNSPECIFIED WHETHER LONG TERM INSULIN USE (HCC): Primary | ICD-10-CM

## 2019-07-11 LAB
ALBUMIN SERPL-MCNC: 4.8 G/DL (ref 3.5–5.2)
ALBUMIN/GLOBULIN RATIO: 1.7 (ref 1–2.5)
ALP BLD-CCNC: 177 U/L (ref 35–104)
ALT SERPL-CCNC: 56 U/L (ref 5–33)
ANION GAP SERPL CALCULATED.3IONS-SCNC: 15 MMOL/L (ref 9–17)
AST SERPL-CCNC: 58 U/L
BILIRUB SERPL-MCNC: 0.34 MG/DL (ref 0.3–1.2)
BUN BLDV-MCNC: 16 MG/DL (ref 6–20)
BUN/CREAT BLD: 15 (ref 9–20)
CALCIUM SERPL-MCNC: 10 MG/DL (ref 8.6–10.4)
CHLORIDE BLD-SCNC: 106 MMOL/L (ref 98–107)
CHOLESTEROL/HDL RATIO: 1.8
CHOLESTEROL: 219 MG/DL
CO2: 24 MMOL/L (ref 20–31)
CREAT SERPL-MCNC: 1.05 MG/DL (ref 0.5–0.9)
ESTIMATED AVERAGE GLUCOSE: 97 MG/DL
GFR AFRICAN AMERICAN: >60 ML/MIN
GFR NON-AFRICAN AMERICAN: 54 ML/MIN
GFR SERPL CREATININE-BSD FRML MDRD: ABNORMAL ML/MIN/{1.73_M2}
GFR SERPL CREATININE-BSD FRML MDRD: ABNORMAL ML/MIN/{1.73_M2}
GLUCOSE BLD-MCNC: 114 MG/DL (ref 70–99)
HBA1C MFR BLD: 5 % (ref 4.8–5.9)
HDLC SERPL-MCNC: 121 MG/DL
LDL CHOLESTEROL: 81 MG/DL (ref 0–130)
POTASSIUM SERPL-SCNC: 4.7 MMOL/L (ref 3.7–5.3)
SODIUM BLD-SCNC: 145 MMOL/L (ref 135–144)
TOTAL PROTEIN: 7.7 G/DL (ref 6.4–8.3)
TRIGL SERPL-MCNC: 86 MG/DL
VLDLC SERPL CALC-MCNC: ABNORMAL MG/DL (ref 1–30)

## 2019-07-11 PROCEDURE — 80061 LIPID PANEL: CPT

## 2019-07-11 PROCEDURE — 83036 HEMOGLOBIN GLYCOSYLATED A1C: CPT

## 2019-07-11 PROCEDURE — 99214 OFFICE O/P EST MOD 30 MIN: CPT | Performed by: NURSE PRACTITIONER

## 2019-07-11 PROCEDURE — 80053 COMPREHEN METABOLIC PANEL: CPT

## 2019-07-11 PROCEDURE — 36415 COLL VENOUS BLD VENIPUNCTURE: CPT

## 2019-07-11 RX ORDER — ATORVASTATIN CALCIUM 10 MG/1
TABLET, FILM COATED ORAL
Qty: 90 TABLET | Refills: 1 | Status: SHIPPED | OUTPATIENT
Start: 2019-07-11 | End: 2020-01-02

## 2019-07-11 RX ORDER — OMEPRAZOLE 20 MG/1
20 CAPSULE, DELAYED RELEASE ORAL DAILY
Qty: 90 CAPSULE | Refills: 1 | Status: SHIPPED | OUTPATIENT
Start: 2019-07-11 | End: 2020-08-13 | Stop reason: SDUPTHER

## 2019-07-11 RX ORDER — LISINOPRIL 40 MG/1
40 TABLET ORAL DAILY
Qty: 90 TABLET | Refills: 1 | Status: SHIPPED | OUTPATIENT
Start: 2019-07-11 | End: 2020-01-14 | Stop reason: SDUPTHER

## 2019-07-11 NOTE — PROGRESS NOTES
dizziness, light-headedness and headaches. Psychiatric/Behavioral: Negative. Prior to Visit Medications    Medication Sig Taking? Authorizing Provider   lisinopril (PRINIVIL;ZESTRIL) 40 MG tablet Take 1 tablet by mouth daily Yes SAEED Tiwari CNP   atorvastatin (LIPITOR) 10 MG tablet TAKE 1 TABLET DAILY Yes SAEED Tiwari CNP   omeprazole (PRILOSEC) 20 MG delayed release capsule Take 1 capsule by mouth daily Yes SAEED Tiwari CNP   Blood Pressure KIT Check blood pressure daily and as needed E11.9 (hypertension)  SAEED Tiwari CNP   buPROPion (WELLBUTRIN SR) 150 MG extended release tablet Take 1 tablet by mouth 2 times daily  SAEED Tiwari CNP   OLANZapine (ZYPREXA) 10 MG tablet   Historical Provider, MD   FLUoxetine (PROZAC) 20 MG capsule Take 60 mg by mouth daily  Historical Provider, MD   Exenatide (BYDUREON) 2 MG PEN Inject 1 pen into the skin once a week  SAEED Tiwari CNP   Blood Pressure Monitor CARY Monitor BP daily; Dx.  Hypertension (Chacorta Kallman)  Narciso Rosas MD   ondansetron (ZOFRAN ODT) 4 MG disintegrating tablet Take 1 tablet by mouth every 8 hours as needed for Nausea  Jennifer Hanson MD   Insulin Pen Needle ([employer] PEN NEEDLES) 31G X 6 MM MISC 1 each by Does not apply route daily  SAEED Tiwari CNP   Glucose Blood (BLOOD GLUCOSE TEST STRIPS) STRP Diagnosis 250.90, tests once daily  SAEED Tiwari CNP   Lancets MISC Diagnosis 250.90, tests once daily  SAEED Tiwari CNP   lamoTRIgine (LAMICTAL) 200 MG tablet Take 200 mg by mouth daily  Historical Provider, MD   Blood Glucose Monitoring Suppl CARY Diagnosis 250.90, tests once daily  SAEED Tiwari CNP   Cholecalciferol (VITAMIN D PO) Take by mouth  Historical Provider, MD   SAPHRIS 10 MG SUBL sublingual tablet nightly   Historical Provider, MD Multiple Vitamins-Minerals (MULTIVITAMIN PO) Take  by mouth daily. Historical Provider, MD        Social History     Tobacco Use    Smoking status: Former Smoker     Packs/day: 0.25     Years: 40.00     Pack years: 10.00     Last attempt to quit: 2019     Years since quittin.2    Smokeless tobacco: Never Used   Substance Use Topics    Alcohol use: Yes     Alcohol/week: 0.0 standard drinks     Comment: socially        Vitals:    19 0816 19 0835   BP: (!) 150/92 (!) 150/90   Site: Right Upper Arm Right Upper Arm   Position: Sitting Sitting   Cuff Size: Large Adult Large Adult   Pulse: 78    SpO2: 99%    Weight: 164 lb (74.4 kg)    Height: 5' 6\" (1.676 m)      Estimated body mass index is 26.47 kg/m² as calculated from the following:    Height as of this encounter: 5' 6\" (1.676 m). Weight as of this encounter: 164 lb (74.4 kg). Physical Exam   Constitutional: She is oriented to person, place, and time. She appears well-developed and well-nourished. No distress. HENT:   Head: Normocephalic and atraumatic. Neck: Neck supple. Cardiovascular: Normal rate, regular rhythm and normal heart sounds. Pulmonary/Chest: Effort normal and breath sounds normal. No stridor. No respiratory distress. She has no wheezes. Neurological: She is alert and oriented to person, place, and time. Psychiatric: She has a normal mood and affect. Her behavior is normal. Thought content normal.   Nursing note and vitals reviewed. Diabetic foot check: Normal strength and range of motion of toes, feet, and ankles bilaterally. No joint deformity. No cyanosis or clubbing. 100% sensation at all 22 test points with the 10 gram filament. Dorsalis pedis pulses intact bilaterally. Capillary refill at the toes was less than 2 seconds. Light touch sensation intact bilaterally. . No skin breakdown, erythema, rub spots, blisters, scaling, or ulcers. No calluses or corns.     Hospital Outpatient Visit on 2019 Desirable   130-159   Borderline      >159   Undesirable     Direct (measured) LDL and calculated LDL are not interchangeable tests.  Chol/HDL Ratio 07/11/2019 1.8  <5 Final            Triglycerides 07/11/2019 86  <150 mg/dL Final    Comment:    Triglyceride Guidelines:     <150   Desirable   150-199  Borderline   200-499  High     >499   Very high   Based on AHA Guidelines for fasting triglyceride, October 2012.  VLDL 07/11/2019 NOT REPORTED  1 - 30 mg/dL Final   Hospital Outpatient Visit on 04/30/2019   Component Date Value Ref Range Status    Color, UA 04/30/2019 NOT REPORTED  YELLOW Final    Turbidity UA 04/30/2019 NOT REPORTED  CLEAR Final    Glucose, Ur 04/30/2019 NEGATIVE  NEGATIVE Final    Bilirubin Urine 04/30/2019 NEGATIVE  NEGATIVE Final    Ketones, Urine 04/30/2019 NEGATIVE  NEGATIVE Final    Specific Gravity, UA 04/30/2019 1.030* 1.010 - 1.025 Final    Urine Hgb 04/30/2019 NEGATIVE  NEGATIVE Final    pH, UA 04/30/2019 6.0  5.0 - 6.0 Final    Protein, UA 04/30/2019 NEGATIVE  NEGATIVE Final    Urobilinogen, Urine 04/30/2019 Normal  Normal Final    Nitrite, Urine 04/30/2019 NEGATIVE  NEGATIVE Final    Leukocyte Esterase, Urine 04/30/2019 1+* NEGATIVE Final    Urinalysis Comments 04/30/2019 NOT REPORTED   Final    - 04/30/2019        Final    WBC, UA 04/30/2019 5 TO 10  0 - 4 /HPF Final    RBC, UA 04/30/2019 0 TO 4  0 - 4 /HPF Final    Casts UA 04/30/2019 NOT REPORTED  0 - 2 /LPF Final    Crystals UA 04/30/2019 NOT REPORTED  None /HPF Final    Epithelial Cells UA 04/30/2019 5 TO 10  0 - 5 /HPF Final    Renal Epithelial, Urine 04/30/2019 NOT REPORTED  0 /HPF Final    Bacteria, UA 04/30/2019 TRACE* None Final    Mucus, UA 04/30/2019 NOT REPORTED  None Final    Trichomonas, UA 04/30/2019 NOT REPORTED  None Final    Amorphous, UA 04/30/2019 NOT REPORTED  None Final    Other Observations UA 04/30/2019 NOT REPORTED  NOT REQ.  Final    Yeast, UA 04/30/2019 NOT REPORTED  None

## 2019-07-14 ASSESSMENT — ENCOUNTER SYMPTOMS
SHORTNESS OF BREATH: 0
WHEEZING: 0
COUGH: 0

## 2019-07-31 ENCOUNTER — OFFICE VISIT (OUTPATIENT)
Dept: CARDIOLOGY | Age: 59
End: 2019-07-31
Payer: COMMERCIAL

## 2019-07-31 VITALS
BODY MASS INDEX: 25.88 KG/M2 | HEART RATE: 82 BPM | HEIGHT: 66 IN | WEIGHT: 161 LBS | SYSTOLIC BLOOD PRESSURE: 138 MMHG | DIASTOLIC BLOOD PRESSURE: 84 MMHG

## 2019-07-31 DIAGNOSIS — E78.5 HYPERLIPIDEMIA, UNSPECIFIED HYPERLIPIDEMIA TYPE: ICD-10-CM

## 2019-07-31 DIAGNOSIS — I10 HYPERTENSION, UNSPECIFIED TYPE: Primary | ICD-10-CM

## 2019-07-31 PROCEDURE — 99214 OFFICE O/P EST MOD 30 MIN: CPT | Performed by: INTERNAL MEDICINE

## 2019-07-31 PROCEDURE — 93000 ELECTROCARDIOGRAM COMPLETE: CPT | Performed by: INTERNAL MEDICINE

## 2019-07-31 RX ORDER — HYDROCHLOROTHIAZIDE 12.5 MG/1
12.5 CAPSULE, GELATIN COATED ORAL EVERY MORNING
Qty: 90 CAPSULE | Refills: 3 | Status: SHIPPED | OUTPATIENT
Start: 2019-07-31 | End: 2020-01-14 | Stop reason: SDUPTHER

## 2019-07-31 NOTE — PROGRESS NOTES
results for input(s): NA, K, CO2, BUN, CREATININE, LABGLOM, GLUCOSE in the last 72 hours. PT/INR: No results for input(s): PROTIME, INR in the last 72 hours. FASTING LIPID PANEL:  Lab Results   Component Value Date     07/11/2019    TRIG 86 07/11/2019     LIVER PROFILE:No results for input(s): AST, ALT, LABALBU in the last 72 hours. TTE 4/5/19  Summary  Grade I (mild) left ventricular diastolic dysfunction. Normal left ventricle size, wall thickness and function with an estimated EF > 55%. Grade I (mild) left ventricular diastolic dysfunction. No significant valvular regurgitation or stenosis seen. No pericardial effusion. Assessment and plan:       -Syncope and seizure- Possibly related to gabapentin withdrawal. Patient also on multiple psych medication. ECG in ER showed normal QTC. TTE showed preserved LV systolic function. Patient has been referred to neurology. Patient to also discuss medication with psychiatrist.   -HTN- Continue lisinopril. Resume HCTZ 12.5mg po qday. Monitor BP. -Hyperlipidemia- continue statin. LDL 81 on 7/11/19  -Depression- on multiple medication and sees psychiatrist  -Chronic smoking- Counseled to quit smoking. -RTC 6 months.     Thor Gan, Sherrie Valera [facility]           [phone]

## 2019-09-18 ENCOUNTER — HOSPITAL ENCOUNTER (OUTPATIENT)
Dept: PHYSICAL THERAPY | Age: 59
Setting detail: THERAPIES SERIES
Discharge: HOME | End: 2019-09-18
Payer: COMMERCIAL

## 2019-09-18 PROCEDURE — 97110 THERAPEUTIC EXERCISES: CPT | Performed by: PHYSICAL THERAPIST

## 2019-09-18 PROCEDURE — 97161 PT EVAL LOW COMPLEX 20 MIN: CPT | Performed by: PHYSICAL THERAPIST

## 2019-09-18 NOTE — FLOWSHEET NOTE
derangement reduction  [x] Reviewed/Progressed HEP activities related to strengthening, flexibility, endurance, ROM. (88538)  [] Reviewed/Progressed HEP activities related to improving balance, coordination, kinesthetic sense, posture, motor skill, proprioception.  (66441)    Manual Treatments:    [] Provided manual therapy to mobilize soft tissue/joints for the purpose of modulating pain, promoting relaxation,  increasing ROM, reducing/eliminating soft tissue swelling/inflammation/restriction, improving soft tissue extensibility. (70508)    Service Based Modalities:      Timed Code Treatment Minutes:     There ex/ HEP 15'    Total Treatment Minutes:   13'    Treatment/Activity Tolerance:  [x] Patient tolerated treatment well [] Patient limited by fatique  [] Patient limited by pain  [] Patient limited by other medical complications  [] Other:     Prognosis: [x] Good [] Fair  [] Poor    Patient Requires Follow-up: [x] Yes  [] No      Goals:  Short term goals  Time Frame for Short term goals: 1 week  Short term goal 1: Start HEP  Short term goal 2: Start traction for disc, nerve root decompression    Long term goals  Time Frame for Long term goals : 4 weeks  Long term goal 1: C spine ROM extension 40 deg, B rotation 70 deg for proper spinal mechanics  Long term goal 2: R adductor pollicis 4-/5 for R hand dexterity  Long term goal 3: 1 leg balance 5 sec          Plan:   [] Continue per plan of care [] Alter current plan (see comments)  [x] Plan of care initiated [] Hold pending MD visit [] Discharge  Plan for Next Session:   Traction                                             LE balance ex    Electronically signed by:  Aparna Flaherty

## 2019-09-18 NOTE — PROGRESS NOTES
Physical Therapy  Initial Assessment  Date: 2019  Patient Name: Javier Ordoñez  MRN: [id number]  : 1960     Treatment Diagnosis: M50.00 cervical disc disease with myelopathy    Restrictions- none       Subjective   General  Chart Reviewed: Yes  Patient assessed for rehabilitation services?: Yes  Response To Previous Treatment: Not applicable  Family / Caregiver Present: No  Referring Practitioner: Rupert Goltz MD - [facility]  Referral Date : 19  Diagnosis: M50.00 cervical disc with myelopathy  Follows Commands: Within Functional Limits  General Comment  Comments: R hand is \" clumsy\". Drops objects. Does not write well. Hard to turn ignition key, door locks. PT Visit Information  PT Insurance Information: BCBS  Subjective  Subjective: Was being seen by neurologist with suspicion of MS. C-spine MRI showed disc involvement, stenosis. Has R hand weakness and interossei atrophy. Is R hand dominant  Pain Screening  Patient Currently in Pain: No  Vital Signs  Patient Currently in Pain: No    Orientation  Orientation  Overall Orientation Status: Within Normal Limits    Social/Functional History  Social/Functional History  Type of Home: House  Home Layout: Two level  ADL Assistance: Independent  Homemaking Assistance: Independent  Ambulation Assistance: Independent  Transfer Assistance: Independent  Active : Yes  Mode of Transportation: Car  Occupation: Unemployed    Objective     Observation/Palpation  Posture: Good    Spine  Cervical: Flexion 30 deg , tight , no pain. Extension 20 deg, tight, no pain. R side bend 15 deg, tight, no pain. L side bend 20 deg. R rotation 40 deg, tight, no pain, L rotation 70 deg. Special Tests: Repeated protrusion no pain. Repeated retraction no pain.  Repeated retraction/ extension, no pain, increased ROM  Joint Mobility  Spine: ROM loss , but no pain (very specific area of R adductor pollicis atrophy)    Strength RUE  Comment: adductor pollicis atrophy, 3+/5  Strength Other  Other:  R 50 psi, L 62 psi(Pinch R 9 psi, L 13 psi)     Additional Measures  Special Tests: Poor balance 1 leg balance B     Assessment   Conditions Requiring Skilled Therapeutic Intervention  Body structures, Functions, Activity limitations: Decreased ROM; Decreased strength  Treatment Diagnosis: M50.00 cervical disc disease with myelopathy  Prognosis: Fair  Decision Making: Medium Complexity  REQUIRES PT FOLLOW UP: Yes  Activity Tolerance  Activity Tolerance: Patient Tolerated treatment well         Plan   Plan  Times per week: 2  Plan weeks: 4  Current Treatment Recommendations: Strengthening, ROM, Modalities, Home Exercise Program       Goals  Short term goals  Time Frame for Short term goals: 1 week  Short term goal 1: Start HEP  Short term goal 2: Start traction for disc, nerve root decompression  Long term goals  Time Frame for Long term goals : 4 weeks  Long term goal 1: C spine ROM extension 40 deg, B rotation 70 deg for proper spinal mechanics  Long term goal 2: R adductor pollicis 4-/5 for R hand dexterity  Long term goal 3: 1 leg balance 5 sec       Therapy Time   Individual Concurrent Group Co-treatment   Time In 1005         Time Out 1055         Minutes Mike 50, PT

## 2019-09-18 NOTE — PLAN OF CARE
weeks    Rehab Potential: [] Excellent [x] Good [] Fair  [] Poor     Electronically signed by:  Michael Gamez PT      If you have any questions or concerns, please don't hesitate to call.   Thank you for your referral.      Physician Signature:________________________________Date:__________________  By signing above, therapists plan is approved by physician

## 2019-09-20 ENCOUNTER — HOSPITAL ENCOUNTER (OUTPATIENT)
Dept: PHYSICAL THERAPY | Age: 59
Setting detail: THERAPIES SERIES
Discharge: HOME | End: 2019-09-20
Payer: COMMERCIAL

## 2019-09-24 ENCOUNTER — HOSPITAL ENCOUNTER (OUTPATIENT)
Dept: PHYSICAL THERAPY | Age: 59
Setting detail: THERAPIES SERIES
Discharge: HOME | End: 2019-09-24
Payer: COMMERCIAL

## 2019-09-24 PROCEDURE — 97012 MECHANICAL TRACTION THERAPY: CPT | Performed by: PHYSICAL THERAPIST

## 2019-09-24 PROCEDURE — 97110 THERAPEUTIC EXERCISES: CPT | Performed by: PHYSICAL THERAPIST

## 2019-09-24 NOTE — FLOWSHEET NOTE
Physical Therapy Daily Treatment Note    Date:  2019    Patient Name:  Salina Weaver    :  1960  MRN: [id number]  Restrictions/Precautions:     Medical/Treatment Diagnosis Information:   · Diagnosis: M50.00 cervical disc with myelopathy  · Treatment Diagnosis: M50.00 cervical disc disease with myelopathy  Insurance/Certification information:  PT Insurance Information: BCBS  Physician Information:  Referring Practitioner: Bruce Vazquez 18 of care signed (Y/N):  n  Visit# / total visits: 28  Pain level: 0/10       Time In:8:53   Time Out:8:25    Progress Note: [x]  Yes  []  No  Next due by: Visit #, 8, or 10/16/19      Subjective:  Pain has not been an issue so far,    Objective:   Observation:   Severe , isolated R 1st interossei atrophy  Test measurements:  Cervical extension progression ex to reduce the disc issue       Exercises: there ex for C-spine ROM  Exercise/Equipment Resistance/Repetitions Other comments   Retraction 10x    Retraction/extension 10x    Active rotation 10x L, 10x R    R side bend 10x              Putty squeeze./pinch yel 5'    Thumb adduction 20x Rubber band   Thumb opposition 20x Rubber band              Counter ex     Squat Matrix     Lunges     [x] Provided verbal/tactile cueing for activities related to strengthening, flexibility, endurance, ROM. (97981)  [] Provided verbal/tactile cueing for activities related to improving balance, coordination, kinesthetic sense, posture, motor skill, proprioception. (51159)    Therapeutic Activities:     [] Therapeutic activities, direct (one-on-one) patient contact (use of dynamic activities to improve functional performance). (61092)    Gait:   [] Provided training and instruction to the patient for ambulation re-education.  (23225)    Self-Care/ADL's  [] Self-care/home management training and compensatory training, meal preparation, safety procedures, and instructions in use of assistive technology devices/adaptive

## 2019-09-26 ENCOUNTER — HOSPITAL ENCOUNTER (OUTPATIENT)
Dept: PHYSICAL THERAPY | Age: 59
Setting detail: THERAPIES SERIES
Discharge: HOME | End: 2019-09-26
Payer: COMMERCIAL

## 2019-09-26 PROCEDURE — 97012 MECHANICAL TRACTION THERAPY: CPT | Performed by: PHYSICAL THERAPIST

## 2019-09-26 PROCEDURE — 97110 THERAPEUTIC EXERCISES: CPT | Performed by: PHYSICAL THERAPIST

## 2019-09-26 NOTE — FLOWSHEET NOTE
Physical Therapy Daily Treatment Note    Date:  2019    Patient Name:  Jose C Mazariegos    :  1960  MRN: [id number]  Restrictions/Precautions:     Medical/Treatment Diagnosis Information:   · Diagnosis: M50.00 cervical disc with myelopathy  · Treatment Diagnosis: M50.00 cervical disc disease with myelopathy  Insurance/Certification information:  PT Insurance Information: BC  Physician Information:  Referring Practitioner: Mamadou Vazquez 18 of care signed (Y/N):  n  Visit# / total visits: 3/8  Pain level: 0/10       Time In:10:32   Time Out:11:15    Progress Note: []  Yes  [x]  No  Next due by: Visit #, 8, or 10/16/19      Subjective:  Neck feels ok. Balance still feels off. Does admits  to long term R knee problems which may effect it. Objective:   Observation:   Severe , isolated R 1st interossei atrophy  Test measurements:  Cervical extension progression ex to reduce the disc issue     No neck pain or peripheral pain with C-spine AROM. Closed chain LE ex with min balance loss    Exercises: there ex for C-spine ROM  Exercise/Equipment Resistance/Repetitions Other comments   Retraction 10x    Retraction/extension 10x    Active rotation 10x L, 10x R    R side bend 10x              Putty squeeze./pinch  HEP   Thumb adduction 20x Rubber band   Thumb opposition 20x Rubber band              Counter ex 20x TR/HR, abd, ext, flex, march   Squat Matrix 10x, 9 position    Lateral walk 20 ft x4    Lunges 10x  Front/ lat   [x] Provided verbal/tactile cueing for activities related to strengthening, flexibility, endurance, ROM. (23305)  [] Provided verbal/tactile cueing for activities related to improving balance, coordination, kinesthetic sense, posture, motor skill, proprioception. (31761)    Therapeutic Activities:     [] Therapeutic activities, direct (one-on-one) patient contact (use of dynamic activities to improve functional performance).  (99055)    Gait:   [] Provided training and instruction to the patient for ambulation re-education. (77242)    Self-Care/ADL's  [] Self-care/home management training and compensatory training, meal preparation, safety procedures, and instructions in use of assistive technology devices/adaptive equipment, direct one-on-one contact. (80886)    Home Exercise Program:   C-spine retraction/ extension for disc derangement reduction. Putty pinch and thumb abd/add ex with rubber band resistance  [x] Reviewed/Progressed HEP activities related to strengthening, flexibility, endurance, ROM. (29698)  [] Reviewed/Progressed HEP activities related to improving balance, coordination, kinesthetic sense, posture, motor skill, proprioception.  (06754)    Manual Treatments:    [] Provided manual therapy to mobilize soft tissue/joints for the purpose of modulating pain, promoting relaxation,  increasing ROM, reducing/eliminating soft tissue swelling/inflammation/restriction, improving soft tissue extensibility. (16137)    Service Based Modalities:  Cervical traction for nerve root decompression 18 # max, 5# min, 15'    Timed Code Treatment Minutes:     There ex 25'    Total Treatment Minutes:   36'    Treatment/Activity Tolerance:  [x] Patient tolerated treatment well [] Patient limited by fatique  [] Patient limited by pain  [] Patient limited by other medical complications  [] Other:     Prognosis: [x] Good [] Fair  [] Poor    Patient Requires Follow-up: [x] Yes  [] No      Goals:  Short term goals  Time Frame for Short term goals: 1 week  Short term goal 1: Start HEP  Short term goal 2: Start traction for disc, nerve root decompression    Long term goals  Time Frame for Long term goals : 4 weeks  Long term goal 1: C spine ROM extension 40 deg, B rotation 70 deg for proper spinal mechanics  Long term goal 2: R adductor pollicis 4-/5 for R hand dexterity  Long term goal 3: 1 leg balance 5 sec          Plan:   [x] Continue per plan of care [] Alter current plan (see comments)  [] Plan of care initiated [] Hold pending MD visit [] Discharge  Plan for Next Session:                                                   Electronically signed by:  Dell Willis

## 2019-09-30 ENCOUNTER — HOSPITAL ENCOUNTER (OUTPATIENT)
Dept: PHYSICAL THERAPY | Age: 59
Setting detail: THERAPIES SERIES
Discharge: HOME | End: 2019-09-30
Payer: COMMERCIAL

## 2019-09-30 PROCEDURE — 97110 THERAPEUTIC EXERCISES: CPT | Performed by: PHYSICAL THERAPY ASSISTANT

## 2019-09-30 PROCEDURE — 97012 MECHANICAL TRACTION THERAPY: CPT | Performed by: PHYSICAL THERAPY ASSISTANT

## 2019-10-02 ENCOUNTER — HOSPITAL ENCOUNTER (OUTPATIENT)
Dept: PHYSICAL THERAPY | Age: 59
Setting detail: THERAPIES SERIES
Discharge: HOME | End: 2019-10-02
Payer: COMMERCIAL

## 2019-10-02 PROCEDURE — 97110 THERAPEUTIC EXERCISES: CPT | Performed by: PHYSICAL THERAPY ASSISTANT

## 2019-10-02 PROCEDURE — 97012 MECHANICAL TRACTION THERAPY: CPT | Performed by: PHYSICAL THERAPY ASSISTANT

## 2019-10-03 DIAGNOSIS — R42 VERTIGO: Primary | ICD-10-CM

## 2019-10-08 ENCOUNTER — HOSPITAL ENCOUNTER (OUTPATIENT)
Dept: PHYSICAL THERAPY | Age: 59
Setting detail: THERAPIES SERIES
Discharge: HOME | End: 2019-10-08
Payer: COMMERCIAL

## 2019-10-08 PROCEDURE — 97110 THERAPEUTIC EXERCISES: CPT | Performed by: PHYSICAL THERAPY ASSISTANT

## 2019-10-08 PROCEDURE — 97012 MECHANICAL TRACTION THERAPY: CPT | Performed by: PHYSICAL THERAPY ASSISTANT

## 2019-10-09 ENCOUNTER — HOSPITAL ENCOUNTER (OUTPATIENT)
Dept: PHYSICAL THERAPY | Age: 59
Setting detail: THERAPIES SERIES
Discharge: HOME | End: 2019-10-09
Payer: COMMERCIAL

## 2019-10-09 PROCEDURE — 97162 PT EVAL MOD COMPLEX 30 MIN: CPT

## 2019-10-09 PROCEDURE — 97112 NEUROMUSCULAR REEDUCATION: CPT

## 2019-10-10 ENCOUNTER — HOSPITAL ENCOUNTER (OUTPATIENT)
Dept: PHYSICAL THERAPY | Age: 59
Setting detail: THERAPIES SERIES
Discharge: HOME | End: 2019-10-10
Payer: COMMERCIAL

## 2019-10-16 ENCOUNTER — HOSPITAL ENCOUNTER (OUTPATIENT)
Dept: PHYSICAL THERAPY | Age: 59
Setting detail: THERAPIES SERIES
Discharge: HOME | End: 2019-10-16
Payer: COMMERCIAL

## 2019-10-16 PROCEDURE — 97112 NEUROMUSCULAR REEDUCATION: CPT

## 2019-10-17 ENCOUNTER — HOSPITAL ENCOUNTER (OUTPATIENT)
Dept: PHYSICAL THERAPY | Age: 59
Setting detail: THERAPIES SERIES
Discharge: HOME | End: 2019-10-17
Payer: COMMERCIAL

## 2019-10-17 PROCEDURE — 97110 THERAPEUTIC EXERCISES: CPT | Performed by: PHYSICAL THERAPY ASSISTANT

## 2019-10-17 PROCEDURE — 97012 MECHANICAL TRACTION THERAPY: CPT | Performed by: PHYSICAL THERAPY ASSISTANT

## 2019-10-23 ENCOUNTER — HOSPITAL ENCOUNTER (OUTPATIENT)
Dept: PHYSICAL THERAPY | Age: 59
Setting detail: THERAPIES SERIES
Discharge: HOME | End: 2019-10-23
Payer: COMMERCIAL

## 2019-10-23 PROCEDURE — 97012 MECHANICAL TRACTION THERAPY: CPT | Performed by: PHYSICAL THERAPY ASSISTANT

## 2019-10-23 PROCEDURE — 97110 THERAPEUTIC EXERCISES: CPT | Performed by: PHYSICAL THERAPY ASSISTANT

## 2019-10-25 ENCOUNTER — HOSPITAL ENCOUNTER (OUTPATIENT)
Dept: PHYSICAL THERAPY | Age: 59
Setting detail: THERAPIES SERIES
Discharge: HOME | End: 2019-10-25
Payer: COMMERCIAL

## 2019-10-25 PROCEDURE — 97110 THERAPEUTIC EXERCISES: CPT | Performed by: PHYSICAL THERAPY ASSISTANT

## 2019-10-28 ENCOUNTER — APPOINTMENT (OUTPATIENT)
Dept: PHYSICAL THERAPY | Age: 59
End: 2019-10-28
Payer: COMMERCIAL

## 2019-10-30 ENCOUNTER — APPOINTMENT (OUTPATIENT)
Dept: PHYSICAL THERAPY | Age: 59
End: 2019-10-30
Payer: COMMERCIAL

## 2019-10-31 ENCOUNTER — HOSPITAL ENCOUNTER (OUTPATIENT)
Dept: PHYSICAL THERAPY | Age: 59
Setting detail: THERAPIES SERIES
Discharge: HOME | End: 2019-10-31
Payer: COMMERCIAL

## 2019-10-31 PROCEDURE — 97110 THERAPEUTIC EXERCISES: CPT

## 2019-11-04 ENCOUNTER — HOSPITAL ENCOUNTER (OUTPATIENT)
Dept: PHYSICAL THERAPY | Age: 59
Setting detail: THERAPIES SERIES
Discharge: HOME | End: 2019-11-04
Payer: COMMERCIAL

## 2019-11-04 PROCEDURE — 97110 THERAPEUTIC EXERCISES: CPT | Performed by: PHYSICAL THERAPY ASSISTANT

## 2019-11-05 ENCOUNTER — APPOINTMENT (OUTPATIENT)
Dept: PHYSICAL THERAPY | Age: 59
End: 2019-11-05
Payer: COMMERCIAL

## 2019-11-07 ENCOUNTER — HOSPITAL ENCOUNTER (OUTPATIENT)
Dept: PHYSICAL THERAPY | Age: 59
Setting detail: THERAPIES SERIES
Discharge: HOME | End: 2019-11-07
Payer: COMMERCIAL

## 2019-11-07 PROCEDURE — 97110 THERAPEUTIC EXERCISES: CPT | Performed by: PHYSICAL THERAPY ASSISTANT

## 2019-11-11 ENCOUNTER — HOSPITAL ENCOUNTER (OUTPATIENT)
Dept: PHYSICAL THERAPY | Age: 59
Setting detail: THERAPIES SERIES
Discharge: HOME | End: 2019-11-11
Payer: COMMERCIAL

## 2019-11-13 ENCOUNTER — HOSPITAL ENCOUNTER (OUTPATIENT)
Dept: PHYSICAL THERAPY | Age: 59
Setting detail: THERAPIES SERIES
Discharge: HOME | End: 2019-11-13
Payer: COMMERCIAL

## 2019-11-13 PROCEDURE — 97110 THERAPEUTIC EXERCISES: CPT | Performed by: PHYSICAL THERAPY ASSISTANT

## 2019-11-15 ENCOUNTER — HOSPITAL ENCOUNTER (OUTPATIENT)
Dept: PHYSICAL THERAPY | Age: 59
Setting detail: THERAPIES SERIES
Discharge: HOME | End: 2019-11-15
Payer: COMMERCIAL

## 2019-11-15 PROCEDURE — 97110 THERAPEUTIC EXERCISES: CPT

## 2019-11-20 ENCOUNTER — HOSPITAL ENCOUNTER (OUTPATIENT)
Dept: PHYSICAL THERAPY | Age: 59
Setting detail: THERAPIES SERIES
Discharge: HOME | End: 2019-11-20
Payer: COMMERCIAL

## 2019-11-20 PROCEDURE — 97110 THERAPEUTIC EXERCISES: CPT | Performed by: PHYSICAL THERAPY ASSISTANT

## 2019-11-22 ENCOUNTER — HOSPITAL ENCOUNTER (OUTPATIENT)
Dept: PHYSICAL THERAPY | Age: 59
Setting detail: THERAPIES SERIES
Discharge: HOME | End: 2019-11-22
Payer: COMMERCIAL

## 2019-11-22 PROCEDURE — 97110 THERAPEUTIC EXERCISES: CPT

## 2019-11-27 ENCOUNTER — HOSPITAL ENCOUNTER (OUTPATIENT)
Dept: PHYSICAL THERAPY | Age: 59
Setting detail: THERAPIES SERIES
Discharge: HOME | End: 2019-11-27
Payer: COMMERCIAL

## 2019-12-10 ENCOUNTER — HOSPITAL ENCOUNTER (OUTPATIENT)
Age: 59
Setting detail: SPECIMEN
Discharge: HOME | End: 2019-12-10
Payer: COMMERCIAL

## 2019-12-10 ENCOUNTER — OFFICE VISIT (OUTPATIENT)
Dept: OBGYN | Age: 59
End: 2019-12-10
Payer: COMMERCIAL

## 2019-12-10 VITALS
BODY MASS INDEX: 29.32 KG/M2 | WEIGHT: 176 LBS | TEMPERATURE: 97.6 F | HEART RATE: 77 BPM | DIASTOLIC BLOOD PRESSURE: 88 MMHG | HEIGHT: 65 IN | SYSTOLIC BLOOD PRESSURE: 146 MMHG

## 2019-12-10 DIAGNOSIS — Z12.4 SCREENING FOR CERVICAL CANCER: ICD-10-CM

## 2019-12-10 DIAGNOSIS — Z12.31 OTHER SCREENING MAMMOGRAM: ICD-10-CM

## 2019-12-10 DIAGNOSIS — Z13.21 ENCOUNTER FOR VITAMIN DEFICIENCY SCREENING: ICD-10-CM

## 2019-12-10 DIAGNOSIS — Z01.419 WELL FEMALE EXAM WITH ROUTINE GYNECOLOGICAL EXAM: Primary | ICD-10-CM

## 2019-12-10 PROCEDURE — G0145 SCR C/V CYTO,THINLAYER,RESCR: HCPCS

## 2019-12-10 PROCEDURE — 99396 PREV VISIT EST AGE 40-64: CPT | Performed by: NURSE PRACTITIONER

## 2019-12-12 LAB — CYTOLOGY REPORT: NORMAL

## 2019-12-20 ENCOUNTER — HOSPITAL ENCOUNTER (OUTPATIENT)
Dept: MAMMOGRAPHY | Age: 59
Discharge: HOME | End: 2019-12-22
Payer: COMMERCIAL

## 2019-12-20 DIAGNOSIS — Z12.31 OTHER SCREENING MAMMOGRAM: ICD-10-CM

## 2019-12-20 PROCEDURE — 77063 BREAST TOMOSYNTHESIS BI: CPT

## 2019-12-23 RX ORDER — BUPROPION HYDROCHLORIDE 150 MG/1
TABLET, EXTENDED RELEASE ORAL
Qty: 180 TABLET | Refills: 4 | Status: SHIPPED | OUTPATIENT
Start: 2019-12-23 | End: 2020-04-10 | Stop reason: SDUPTHER

## 2020-01-02 RX ORDER — ATORVASTATIN CALCIUM 10 MG/1
TABLET, FILM COATED ORAL
Qty: 90 TABLET | Refills: 4 | Status: SHIPPED | OUTPATIENT
Start: 2020-01-02 | End: 2020-01-14 | Stop reason: SDUPTHER

## 2020-01-09 ENCOUNTER — TELEPHONE (OUTPATIENT)
Dept: OBGYN | Age: 60
End: 2020-01-09

## 2020-01-14 ENCOUNTER — OFFICE VISIT (OUTPATIENT)
Dept: FAMILY MEDICINE CLINIC | Age: 60
End: 2020-01-14
Payer: COMMERCIAL

## 2020-01-14 ENCOUNTER — HOSPITAL ENCOUNTER (OUTPATIENT)
Dept: LAB | Age: 60
Discharge: HOME | End: 2020-01-14
Payer: COMMERCIAL

## 2020-01-14 VITALS
BODY MASS INDEX: 30.22 KG/M2 | OXYGEN SATURATION: 99 % | HEIGHT: 66 IN | HEART RATE: 80 BPM | SYSTOLIC BLOOD PRESSURE: 132 MMHG | DIASTOLIC BLOOD PRESSURE: 80 MMHG | WEIGHT: 188 LBS

## 2020-01-14 LAB
ANION GAP SERPL CALCULATED.3IONS-SCNC: 18 MMOL/L (ref 9–17)
BUN BLDV-MCNC: 22 MG/DL (ref 6–20)
BUN/CREAT BLD: 20 (ref 9–20)
CALCIUM SERPL-MCNC: 9.7 MG/DL (ref 8.6–10.4)
CHLORIDE BLD-SCNC: 103 MMOL/L (ref 98–107)
CHOLESTEROL/HDL RATIO: 2.4
CHOLESTEROL: 226 MG/DL
CO2: 25 MMOL/L (ref 20–31)
CREAT SERPL-MCNC: 1.12 MG/DL (ref 0.5–0.9)
CREATININE URINE: 132.9 MG/DL (ref 28–217)
ESTIMATED AVERAGE GLUCOSE: 108 MG/DL
GFR AFRICAN AMERICAN: >60 ML/MIN
GFR NON-AFRICAN AMERICAN: 50 ML/MIN
GFR SERPL CREATININE-BSD FRML MDRD: ABNORMAL ML/MIN/{1.73_M2}
GFR SERPL CREATININE-BSD FRML MDRD: ABNORMAL ML/MIN/{1.73_M2}
GLUCOSE BLD-MCNC: 107 MG/DL (ref 70–99)
HBA1C MFR BLD: 5.4 % (ref 4.8–5.9)
HDLC SERPL-MCNC: 94 MG/DL
LDL CHOLESTEROL: 80 MG/DL (ref 0–130)
MICROALBUMIN/CREAT 24H UR: 15 MG/L
MICROALBUMIN/CREAT UR-RTO: 11 MCG/MG CREAT
POTASSIUM SERPL-SCNC: 4.2 MMOL/L (ref 3.7–5.3)
SODIUM BLD-SCNC: 146 MMOL/L (ref 135–144)
TRIGL SERPL-MCNC: 261 MG/DL
VITAMIN D 25-HYDROXY: 53.1 NG/ML (ref 30–100)
VLDLC SERPL CALC-MCNC: ABNORMAL MG/DL (ref 1–30)

## 2020-01-14 PROCEDURE — 80061 LIPID PANEL: CPT

## 2020-01-14 PROCEDURE — 82306 VITAMIN D 25 HYDROXY: CPT

## 2020-01-14 PROCEDURE — 82570 ASSAY OF URINE CREATININE: CPT

## 2020-01-14 PROCEDURE — 83036 HEMOGLOBIN GLYCOSYLATED A1C: CPT

## 2020-01-14 PROCEDURE — 99214 OFFICE O/P EST MOD 30 MIN: CPT | Performed by: NURSE PRACTITIONER

## 2020-01-14 PROCEDURE — 36415 COLL VENOUS BLD VENIPUNCTURE: CPT

## 2020-01-14 PROCEDURE — 82043 UR ALBUMIN QUANTITATIVE: CPT

## 2020-01-14 PROCEDURE — 80048 BASIC METABOLIC PNL TOTAL CA: CPT

## 2020-01-14 RX ORDER — LISINOPRIL 40 MG/1
40 TABLET ORAL DAILY
Qty: 90 TABLET | Refills: 1 | Status: SHIPPED | OUTPATIENT
Start: 2020-01-14 | End: 2020-01-14 | Stop reason: SDUPTHER

## 2020-01-14 RX ORDER — ATORVASTATIN CALCIUM 10 MG/1
TABLET, FILM COATED ORAL
Qty: 90 TABLET | Refills: 1 | Status: SHIPPED | OUTPATIENT
Start: 2020-01-14 | End: 2020-08-13 | Stop reason: SDUPTHER

## 2020-01-14 RX ORDER — LISINOPRIL 40 MG/1
40 TABLET ORAL DAILY
Qty: 90 TABLET | Refills: 1 | Status: SHIPPED | OUTPATIENT
Start: 2020-01-14 | End: 2020-08-13 | Stop reason: SDUPTHER

## 2020-01-14 RX ORDER — HYDROCHLOROTHIAZIDE 12.5 MG/1
12.5 CAPSULE, GELATIN COATED ORAL EVERY MORNING
Qty: 90 CAPSULE | Refills: 1 | Status: SHIPPED | OUTPATIENT
Start: 2020-01-14 | End: 2020-08-13 | Stop reason: SDUPTHER

## 2020-01-14 RX ORDER — HYDROCHLOROTHIAZIDE 12.5 MG/1
12.5 CAPSULE, GELATIN COATED ORAL EVERY MORNING
Qty: 90 CAPSULE | Refills: 3 | Status: SHIPPED | OUTPATIENT
Start: 2020-01-14 | End: 2020-01-14 | Stop reason: SDUPTHER

## 2020-01-14 ASSESSMENT — ENCOUNTER SYMPTOMS
CONSTIPATION: 0
ABDOMINAL PAIN: 0
COLOR CHANGE: 0
SHORTNESS OF BREATH: 0
SINUS PAIN: 0
CHEST TIGHTNESS: 0
DIARRHEA: 0
SINUS PRESSURE: 0
BLOOD IN STOOL: 0

## 2020-01-14 NOTE — PROGRESS NOTES
2020     Harini Phoenix (:  1960) is a 64 y.o. female, here for evaluation of the following medical concerns:    Diabetes   She presents for her follow-up diabetic visit. She has type 2 diabetes mellitus. Her disease course has been stable. There are no hypoglycemic associated symptoms. Pertinent negatives for hypoglycemia include no dizziness, headaches or nervousness/anxiousness. There are no diabetic associated symptoms. Pertinent negatives for diabetes include no chest pain, no polydipsia and no polyuria. There are no hypoglycemic complications. Diabetic complications include peripheral neuropathy. Risk factors for coronary artery disease include diabetes mellitus, dyslipidemia, hypertension, obesity and post-menopausal. Her weight is stable. She is following a generally healthy diet. Meal planning includes avoidance of concentrated sweets. She has not had a previous visit with a dietitian. She rarely participates in exercise. Her breakfast blood glucose range is generally 110-130 mg/dl. An ACE inhibitor/angiotensin II receptor blocker is being taken. She does not see a podiatrist.Eye exam is current. Hypertension   This is a chronic problem. The current episode started more than 1 year ago. The problem is unchanged. The problem is controlled. Pertinent negatives include no anxiety, chest pain, headaches, peripheral edema or shortness of breath. There are no associated agents to hypertension. Risk factors for coronary artery disease include obesity, post-menopausal state, diabetes mellitus and dyslipidemia. Past treatments include ACE inhibitors and diuretics. The current treatment provides significant improvement. Compliance problems include diet and exercise. Hypertensive end-organ damage includes kidney disease. Hyperlipidemia   This is a chronic problem. The current episode started more than 1 year ago. The problem is uncontrolled. Recent lipid tests were reviewed and are high. Exacerbating diseases include diabetes and obesity. Factors aggravating her hyperlipidemia include fatty foods and thiazides. Pertinent negatives include no chest pain, myalgias or shortness of breath. Current antihyperlipidemic treatment includes statins. The current treatment provides moderate improvement of lipids. Compliance problems include adherence to diet and adherence to exercise. Risk factors for coronary artery disease include diabetes mellitus, dyslipidemia, hypertension and post-menopausal.     Patient presents for 6 month follow up. She reports she is doing well, is to be checking blood sugar daily but only checks about 4x/week with readings between 120-125. A1C today 5.4. Pt denies s/s of hyper or hypoglycemia, no low readings. Pt does endorse some neuropathy to BL feet, coldness, tingling. Did not tolerate gabapentin or lyrica in the past, reports the symptoms in feet has much improved since quitting smoking about 1 year ago. Follows with neurology. Past Medical History:   Diagnosis Date    Cervicogenic headache     Chronic sinusitis     Conductive hearing loss     Degenerative arthritis of cervical spine     Depression     Elevated fasting glucose     Epigastric abdominal pain     GERD (gastroesophageal reflux disease)     History of cervical dysplasia 1987    LASER    History of methamphetamine abuse ([address]     (Quit 6-2001)    LSIL (low grade squamous intraepithelial lesion) on Pap smear 2002    Cryo    Tobacco abuse        Past Surgical History:   Procedure Laterality Date    CATARACT REMOVAL Bilateral     CATARACT REMOVAL WITH IMPLANT Left 3/22/2016    Dr. Ortiz Gao, [address] COLONOSCOPY  03/03/2017    Dr. Ignacio Chavez, [facility]  03/10/1987    Laser conization of cervix (Hx of moderate dysplasia on Pap test).  UPPER GASTROINTESTINAL ENDOSCOPY  05/02/2013    Mild gastritis. Social History     Socioeconomic History    Marital status:      Spouse name: None    Number of children: None    Years of education: None    Highest education level: None   Occupational History    None   Social Needs    Financial resource strain: None    Food insecurity:     Worry: None     Inability: None    Transportation needs:     Medical: None     Non-medical: None   Tobacco Use    Smoking status: Former Smoker     Packs/day: 0.25     Years: 40.00     Pack years: 10.00     Last attempt to quit: 2019     Years since quittin.7    Smokeless tobacco: Never Used   Substance and Sexual Activity    Alcohol use: Yes     Alcohol/week: 0.0 standard drinks     Comment: socially    Drug use: Yes     Types: Opiates , Marijuana     Comment: Quit . Followed up at the recovery center.     Sexual activity: Yes     Partners: Male     Birth control/protection: Post-menopausal     Comment: Menarche age 17  Menopause age 49   Lifestyle    Physical activity:     Days per week: None     Minutes per session: None    Stress: None   Relationships    Social connections:     Talks on phone: None     Gets together: None     Attends Holiness service: None     Active member of club or organization: None     Attends meetings of clubs or organizations: None     Relationship status: None    Intimate partner violence:     Fear of current or ex partner: None     Emotionally abused: None     Physically abused: None     Forced sexual activity: None   Other Topics Concern    None   Social History Narrative    None       Family History   Problem Relation Age of Onset    Diabetes Mother           general health deterioration after falling    Osteoporosis Mother     High Cholesterol Father     Lung Cancer Father          age 61    Diabetes Other     Diabetes Other     Diabetes Brother        Allergies   Allergen Reactions    Gabapentin Anaphylaxis    Lyrica [Pregabalin]     Ciprofloxacin Other (See Comments)     Stomach pain    Keflex [Cephalexin] Other (See Comments)     Stomach pain       Current Outpatient Medications   Medication Sig Dispense Refill    buPROPion (WELLBUTRIN SR) 150 MG extended release tablet TAKE 1 TABLET TWICE A  tablet 4    OLANZapine (ZYPREXA) 10 MG tablet   0    FLUoxetine (PROZAC) 20 MG capsule Take 60 mg by mouth daily      Exenatide (BYDUREON) 2 MG PEN Inject 1 pen into the skin once a week 12 pen 3    lamoTRIgine (LAMICTAL) 200 MG tablet Take 200 mg by mouth daily      hydrochlorothiazide (MICROZIDE) 12.5 MG capsule Take 1 capsule by mouth every morning 90 capsule 1    lisinopril (PRINIVIL;ZESTRIL) 40 MG tablet Take 1 tablet by mouth daily 90 tablet 1    atorvastatin (LIPITOR) 10 MG tablet TAKE 1 TABLET DAILY 90 tablet 1    omeprazole (PRILOSEC) 20 MG delayed release capsule Take 1 capsule by mouth daily 90 capsule 1    Blood Pressure KIT Check blood pressure daily and as needed E11.9 (hypertension) 1 kit 0    Blood Pressure Monitor CARY Monitor BP daily; Dx. Hypertension (I10) 1 Device 0    ondansetron (ZOFRAN ODT) 4 MG disintegrating tablet Take 1 tablet by mouth every 8 hours as needed for Nausea 20 tablet 0    Insulin Pen Needle ([employer] PEN NEEDLES) 31G X 6 MM MISC 1 each by Does not apply route daily 100 each 3    Glucose Blood (BLOOD GLUCOSE TEST STRIPS) STRP Diagnosis 250.90, tests once daily 50 strip 11    Lancets MISC Diagnosis 250.90, tests once daily 50 each 11    Blood Glucose Monitoring Suppl CARY Diagnosis 250.90, tests once daily 1 Device 0    Cholecalciferol (VITAMIN D PO) Take by mouth      SAPHRIS 10 MG SUBL sublingual tablet nightly       Multiple Vitamins-Minerals (MULTIVITAMIN PO) Take  by mouth daily. No current facility-administered medications for this visit. Review of Systems   Constitutional: Negative for activity change, appetite change and fever.    HENT: Negative for congestion, ear pain, sinus pressure and sinus pain. Respiratory: Negative for chest tightness and shortness of breath. Cardiovascular: Negative for chest pain and leg swelling. Gastrointestinal: Negative for abdominal pain, blood in stool, constipation and diarrhea. Endocrine: Negative for polydipsia and polyuria. Endorses neuropathy to BLE, pins and needles and cold, see HPI   Genitourinary: Negative for difficulty urinating, frequency and hematuria. Musculoskeletal: Negative for arthralgias and myalgias. Skin: Negative for color change, rash and wound. Neurological: Negative for dizziness, light-headedness and headaches. Psychiatric/Behavioral: The patient is not nervous/anxious. Prior to Visit Medications    Medication Sig Taking? Authorizing Provider   buPROPion (WELLBUTRIN SR) 150 MG extended release tablet TAKE 1 TABLET TWICE A DAY Yes SAEED Coreas CNP   OLANZapine (ZYPREXA) 10 MG tablet  Yes Historical Provider, MD   FLUoxetine (PROZAC) 20 MG capsule Take 60 mg by mouth daily Yes Historical Provider, MD   Exenatide (BYDUREON) 2 MG PEN Inject 1 pen into the skin once a week Yes SAEED Werner CNP   lamoTRIgine (LAMICTAL) 200 MG tablet Take 200 mg by mouth daily Yes Historical Provider, MD   hydrochlorothiazide (MICROZIDE) 12.5 MG capsule Take 1 capsule by mouth every morning  SAEED Werner CNP   lisinopril (PRINIVIL;ZESTRIL) 40 MG tablet Take 1 tablet by mouth daily  SAEED Werner CNP   atorvastatin (LIPITOR) 10 MG tablet TAKE 1 TABLET DAILY  SAEED Werner CNP   omeprazole (PRILOSEC) 20 MG delayed release capsule Take 1 capsule by mouth daily  SAEED Werner CNP   Blood Pressure KIT Check blood pressure daily and as needed E11.9 (hypertension)  SAEED Werner CNP   Blood Pressure Monitor CARY Monitor BP daily; Dx.  Hypertension (I10)  Tracey Kerr MD   ondansetron friction rub. No gallop. Pulmonary:      Effort: Pulmonary effort is normal. No respiratory distress. Breath sounds: No wheezing, rhonchi or rales. Abdominal:      General: Abdomen is flat. Palpations: Abdomen is soft. Musculoskeletal:         General: No swelling, tenderness or signs of injury. Right lower leg: No edema. Left lower leg: No edema. Skin:     Capillary Refill: Capillary refill takes less than 2 seconds. Coloration: Skin is not jaundiced or pale. Findings: No erythema or rash. Neurological:      General: No focal deficit present. Mental Status: She is alert and oriented to person, place, and time. Mental status is at baseline. Cranial Nerves: No cranial nerve deficit. Sensory: No sensory deficit. Motor: No weakness. Gait: Gait normal.   Psychiatric:         Mood and Affect: Mood normal.         Behavior: Behavior normal.         Thought Content: Thought content normal.         Judgment: Judgment normal.       Hospital Outpatient Visit on 01/14/2020   Component Date Value Ref Range Status    Cholesterol 01/14/2020 226* <200 mg/dL Final    Comment:    Cholesterol Guidelines:      <200  Desirable   200-240  Borderline      >240  Undesirable         HDL 01/14/2020 94  >40 mg/dL Final    Comment:    HDL Guidelines:    <40     Undesirable   40-59    Borderline    >59     Desirable         LDL Cholesterol 01/14/2020 80  0 - 130 mg/dL Final    Comment:    LDL Guidelines:     <100    Desirable   100-129   Near to/above Desirable   130-159   Borderline      >159   Undesirable     Direct (measured) LDL and calculated LDL are not interchangeable tests.  Chol/HDL Ratio 01/14/2020 2.4  <5 Final            Triglycerides 01/14/2020 261* <150 mg/dL Final    Comment:    Triglyceride Guidelines:     <150   Desirable   150-199  Borderline   200-499  High     >499   Very high   Based on AHA Guidelines for fasting triglyceride, October 2012.          VLDL 01/14/2020 NOT REPORTED* 1 - 30 mg/dL Final    Glucose 01/14/2020 107* 70 - 99 mg/dL Final    BUN 01/14/2020 22* 6 - 20 mg/dL Final    CREATININE 01/14/2020 1.12* 0.50 - 0.90 mg/dL Final    Bun/Cre Ratio 01/14/2020 20  9 - 20 Final    Calcium 01/14/2020 9.7  8.6 - 10.4 mg/dL Final    Sodium 01/14/2020 146* 135 - 144 mmol/L Final    Potassium 01/14/2020 4.2  3.7 - 5.3 mmol/L Final    Chloride 01/14/2020 103  98 - 107 mmol/L Final    CO2 01/14/2020 25  20 - 31 mmol/L Final    Anion Gap 01/14/2020 18* 9 - 17 mmol/L Final    GFR Non- 01/14/2020 50* >60 mL/min Final    GFR  01/14/2020 >60  >60 mL/min Final    GFR Comment 01/14/2020        Final    Comment: Average GFR for 52-63 years old:   80 mL/min/1.73sq m  Chronic Kidney Disease:   <60 mL/min/1.73sq m  Kidney failure:   <15 mL/min/1.73sq m              eGFR calculated using average adult body mass. Additional eGFR calculator available at:        [email or web address]            GFR Staging 01/14/2020 NOT REPORTED   Final    Hemoglobin A1C 01/14/2020 5.4  4.8 - 5.9 % Final    Estimated Avg Glucose 01/14/2020 108  mg/dL Final    Microalb, Ur 01/14/2020 15  <21 mg/L Final    Creatinine, Ur 01/14/2020 132.9  28.0 - 217.0 mg/dL Final    Microalb/Crt. Ratio 01/14/2020 11  <25 mcg/mg creat Final   Hospital Outpatient Visit on 12/10/2019   Component Date Value Ref Range Status    Cytology Report 12/10/2019    Final                    QBD:[id number]  [facility]  [facility]  ANATOMIC PATHOLOGY  [address] [place] [address], [place] [address].   [place], [address]  [phone]  Fax: [phone]  GYNECOLOGIC CYTOLOGY REPORT    Patient Name: Monique Arnett  MR#: [id number]  Specimen #[id number]  Source:  1: Cervical material, (ThinPrep vial, Imaging-assisted review)    Clinical History  Postmenopausal  Z12.4 Encounter for screening for malignant neoplasm of cervix  High risk HPV DNA

## 2020-01-14 NOTE — TELEPHONE ENCOUNTER
Pt calling today stating her meds were sent to [facility] and [facility]

## 2020-01-16 ENCOUNTER — TELEPHONE (OUTPATIENT)
Dept: FAMILY MEDICINE CLINIC | Age: 60
End: 2020-01-16

## 2020-01-16 NOTE — TELEPHONE ENCOUNTER
----- Message from SAEED Javier CNP sent at 1/16/2020  4:18 PM EST -----  Triglycerides are significantly higher this time than the last. Work on low fat diet. Increase exercise. All other labs are stable.  Repeat in 6 months with a CMP instead of a BMP

## 2020-03-04 ENCOUNTER — OFFICE VISIT (OUTPATIENT)
Dept: PRIMARY CARE CLINIC | Age: 60
End: 2020-03-04
Payer: COMMERCIAL

## 2020-03-04 VITALS
HEART RATE: 86 BPM | DIASTOLIC BLOOD PRESSURE: 100 MMHG | BODY MASS INDEX: 30.05 KG/M2 | TEMPERATURE: 96.3 F | OXYGEN SATURATION: 97 % | WEIGHT: 187 LBS | HEIGHT: 66 IN | SYSTOLIC BLOOD PRESSURE: 155 MMHG | RESPIRATION RATE: 16 BRPM

## 2020-03-04 PROCEDURE — 99214 OFFICE O/P EST MOD 30 MIN: CPT | Performed by: FAMILY MEDICINE

## 2020-03-04 RX ORDER — AMOXICILLIN 875 MG/1
875 TABLET, COATED ORAL 2 TIMES DAILY
Qty: 20 TABLET | Refills: 0 | Status: SHIPPED | OUTPATIENT
Start: 2020-03-04 | End: 2020-08-13 | Stop reason: ALTCHOICE

## 2020-03-04 ASSESSMENT — ENCOUNTER SYMPTOMS
DIARRHEA: 0
SINUS COMPLAINT: 1
NAUSEA: 0
SINUS PRESSURE: 1
VOMITING: 0
SHORTNESS OF BREATH: 0
EYE REDNESS: 0
RHINORRHEA: 1
SINUS PAIN: 1
EYE DISCHARGE: 0
SORE THROAT: 1
COUGH: 1
ABDOMINAL PAIN: 0
WHEEZING: 0
CONSTIPATION: 0
TROUBLE SWALLOWING: 0

## 2020-03-04 NOTE — PROGRESS NOTES
3/4/2020     Nika Phoenix (:  1960) is a 64 y.o. female, here for evaluation of the following medical concerns:    Sinus Problem   This is a new problem. The current episode started in the past 7 days (started about 3 days ago). The problem has been gradually worsening since onset. There has been no fever. Associated symptoms include congestion, coughing (slight cough, but non productive), headaches, sinus pressure and a sore throat. Pertinent negatives include no chills, ear pain, neck pain or shortness of breath. (Ears feel plugged) Treatments tried: ibuprofen. The treatment provided no relief. Did review patient's med list, allergies, social history,pmhx and pshx today as noted in the record. Review of Systems   Constitutional: Negative for chills, fatigue and fever. HENT: Positive for congestion, postnasal drip, rhinorrhea, sinus pressure, sinus pain and sore throat. Negative for ear pain and trouble swallowing. Eyes: Negative for discharge and redness. Respiratory: Positive for cough (slight cough, but non productive). Negative for shortness of breath and wheezing. Cardiovascular: Negative for chest pain. Gastrointestinal: Negative for abdominal pain, constipation, diarrhea, nausea and vomiting. Genitourinary: Negative for dysuria, flank pain, frequency and urgency. Musculoskeletal: Negative for arthralgias, myalgias and neck pain. Skin: Negative for rash and wound. Allergic/Immunologic: Negative for environmental allergies. Neurological: Positive for headaches. Negative for dizziness, weakness and light-headedness. Hematological: Negative for adenopathy. Psychiatric/Behavioral: Negative. Prior to Visit Medications    Medication Sig Taking?  Authorizing Provider   hydrochlorothiazide (MICROZIDE) 12.5 MG capsule Take 1 capsule by mouth every morning Yes SAEED Raya - CNP   lisinopril (PRINIVIL;ZESTRIL) 40 MG tablet Take 1 tablet by mouth daily Yes SAEED Wayne CNP   atorvastatin (LIPITOR) 10 MG tablet TAKE 1 TABLET DAILY Yes SAEED Wayne CNP   buPROPion (WELLBUTRIN SR) 150 MG extended release tablet TAKE 1 TABLET TWICE A DAY Yes SAEED Wayne CNP   omeprazole (PRILOSEC) 20 MG delayed release capsule Take 1 capsule by mouth daily Yes SAEED Wayne CNP   Blood Pressure KIT Check blood pressure daily and as needed E11.9 (hypertension) Yes SAEED Wayne CNP   OLANZapine (ZYPREXA) 10 MG tablet  Yes Historical Provider, MD   FLUoxetine (PROZAC) 20 MG capsule Take 60 mg by mouth daily Yes Historical Provider, MD   Exenatide (BYDUREON) 2 MG PEN Inject 1 pen into the skin once a week Yes SAEED Wayne CNP   Blood Pressure Monitor CARY Monitor BP daily; Dx. Hypertension (I10) Yes Jose Luis Aviles MD   ondansetron (ZOFRAN ODT) 4 MG disintegrating tablet Take 1 tablet by mouth every 8 hours as needed for Nausea Yes Vilma Lezama MD   Insulin Pen Needle ([employer] PEN NEEDLES) 31G X 6 MM MISC 1 each by Does not apply route daily Yes SAEED Wayne CNP   Glucose Blood (BLOOD GLUCOSE TEST STRIPS) STRP Diagnosis 250.90, tests once daily Yes SAEED Wayne CNP   Lancets MISC Diagnosis 250.90, tests once daily Yes SAEED Wayne CNP   lamoTRIgine (LAMICTAL) 200 MG tablet Take 200 mg by mouth daily Yes Historical Provider, MD   Blood Glucose Monitoring Suppl CARY Diagnosis 250.90, tests once daily Yes SAEED Wayne CNP   Cholecalciferol (VITAMIN D PO) Take by mouth Yes Historical Provider, MD   SAPHRIS 10 MG SUBL sublingual tablet nightly  Yes Historical Provider, MD   Multiple Vitamins-Minerals (MULTIVITAMIN PO) Take  by mouth daily.  Yes Historical Provider, MD        Social History     Tobacco Use    Smoking status: Former Smoker     Packs/day: 0.25     Years: 40.00 Pack years: 10.00     Last attempt to quit: 2019     Years since quittin.8    Smokeless tobacco: Never Used   Substance Use Topics    Alcohol use: Yes     Alcohol/week: 0.0 standard drinks     Comment: socially        Vitals:    20 1004   BP: (!) 176/110   Pulse: 86   Resp: 16   Temp: 96.3 °F (35.7 °C)   SpO2: 97%   Weight: 187 lb (84.8 kg)   Height: 5' 6\" (1.676 m)     Estimated body mass index is 30.18 kg/m² as calculated from the following:    Height as of this encounter: 5' 6\" (1.676 m). Weight as of this encounter: 187 lb (84.8 kg). Physical Exam  Vitals signs and nursing note reviewed. Constitutional:       General: She is not in acute distress. Appearance: Normal appearance. She is well-developed. She is not diaphoretic. HENT:      Head: Normocephalic and atraumatic. Right Ear: External ear normal.      Left Ear: External ear normal.      Ears:      Comments: TMs dull with fluid behind the TM     Nose: Congestion and rhinorrhea present. Mouth/Throat:      Pharynx: No posterior oropharyngeal erythema. Comments: Post nasal drainage noted  Eyes:      General: No scleral icterus. Right eye: No discharge. Left eye: No discharge. Conjunctiva/sclera: Conjunctivae normal.      Pupils: Pupils are equal, round, and reactive to light. Neck:      Musculoskeletal: Normal range of motion and neck supple. Thyroid: No thyromegaly. Cardiovascular:      Rate and Rhythm: Normal rate and regular rhythm. Heart sounds: Normal heart sounds. Pulmonary:      Effort: Pulmonary effort is normal. No respiratory distress. Breath sounds: Normal breath sounds. No wheezing. Lymphadenopathy:      Cervical: Cervical adenopathy present. Skin:     General: Skin is warm and dry. Findings: No rash. Neurological:      Mental Status: She is alert and oriented to person, place, and time.    Psychiatric:         Behavior: Behavior normal. Thought Content: Thought content normal.         Judgment: Judgment normal.         ASSESSMENT/PLAN:  Encounter Diagnoses   Name Primary?  Acute non-recurrent pansinusitis Yes    Elevated blood pressure reading      Orders Placed This Encounter   Medications    amoxicillin (AMOXIL) 875 MG tablet     Sig: Take 1 tablet by mouth 2 times daily     Dispense:  20 tablet     Refill:  0     No orders of the defined types were placed in this encounter. Blood pressure is more elevated than previous readings and she notes that she is due to follow up with Dr. Apple Pacheco, cardiology. Did recommend that she check it at home and she is going to obtain a blood pressure monitor so she can monitor at home. Would keep a log and bring it in to her office visit with Dr. Apple Pacheco for further med adjustment as needed. Increase fluids and rest    Tylenol/Motrin prn    RX as above for acute sinus infection. Can use mucinex over the counter for any pressure or thick sinus drainage    Return  if no improvement in symptoms or if any further symptoms arise. An electronic signature was used to authenticate this note.     --Bethel Wahl,  on 3/4/2020 at 11:04 AM

## 2020-04-10 RX ORDER — EXENATIDE 2 MG/.65ML
2 INJECTION, SUSPENSION, EXTENDED RELEASE SUBCUTANEOUS WEEKLY
Qty: 12 PEN | Refills: 3 | Status: SHIPPED | OUTPATIENT
Start: 2020-04-10 | End: 2020-06-15 | Stop reason: SDUPTHER

## 2020-04-10 RX ORDER — BUPROPION HYDROCHLORIDE 150 MG/1
TABLET, EXTENDED RELEASE ORAL
Qty: 180 TABLET | Refills: 4 | Status: SHIPPED | OUTPATIENT
Start: 2020-04-10 | End: 2020-08-13 | Stop reason: SINTOL

## 2020-04-28 ENCOUNTER — TELEPHONE (OUTPATIENT)
Dept: FAMILY MEDICINE CLINIC | Age: 60
End: 2020-04-28

## 2020-05-19 ENCOUNTER — HOSPITAL ENCOUNTER (EMERGENCY)
Age: 60
Discharge: HOME | End: 2020-05-19
Attending: NURSE PRACTITIONER
Payer: COMMERCIAL

## 2020-05-19 VITALS
HEIGHT: 66 IN | RESPIRATION RATE: 16 BRPM | SYSTOLIC BLOOD PRESSURE: 150 MMHG | HEART RATE: 80 BPM | WEIGHT: 185 LBS | TEMPERATURE: 98.1 F | BODY MASS INDEX: 29.73 KG/M2 | OXYGEN SATURATION: 97 % | DIASTOLIC BLOOD PRESSURE: 96 MMHG

## 2020-05-19 LAB
-: ABNORMAL
ABSOLUTE EOS #: 0.2 K/UL (ref 0–0.44)
ABSOLUTE IMMATURE GRANULOCYTE: 0.03 K/UL (ref 0–0.3)
ABSOLUTE LYMPH #: 1.64 K/UL (ref 1.1–3.7)
ABSOLUTE MONO #: 0.61 K/UL (ref 0.1–1.2)
AMORPHOUS: ABNORMAL
AMPHETAMINE SCREEN URINE: NEGATIVE
ANION GAP SERPL CALCULATED.3IONS-SCNC: 15 MMOL/L (ref 9–17)
BACTERIA: ABNORMAL
BARBITURATE SCREEN URINE: NEGATIVE
BASOPHILS # BLD: 1 % (ref 0–2)
BASOPHILS ABSOLUTE: 0.05 K/UL (ref 0–0.2)
BENZODIAZEPINE SCREEN, URINE: NEGATIVE
BILIRUBIN URINE: NEGATIVE
BUN BLDV-MCNC: 13 MG/DL (ref 6–20)
BUN/CREAT BLD: 13 (ref 9–20)
BUPRENORPHINE URINE: NEGATIVE
CALCIUM SERPL-MCNC: 9.6 MG/DL (ref 8.6–10.4)
CANNABINOID SCREEN URINE: NEGATIVE
CASTS UA: ABNORMAL /LPF (ref 0–2)
CHLORIDE BLD-SCNC: 103 MMOL/L (ref 98–107)
CO2: 22 MMOL/L (ref 20–31)
COCAINE METABOLITE, URINE: NEGATIVE
COLOR: ABNORMAL
COMMENT UA: ABNORMAL
CREAT SERPL-MCNC: 1.02 MG/DL (ref 0.5–0.9)
CRYSTALS, UA: ABNORMAL /HPF
DIFFERENTIAL TYPE: ABNORMAL
EOSINOPHILS RELATIVE PERCENT: 3 % (ref 1–4)
EPITHELIAL CELLS UA: ABNORMAL /HPF (ref 0–5)
GFR AFRICAN AMERICAN: >60 ML/MIN
GFR NON-AFRICAN AMERICAN: 56 ML/MIN
GFR SERPL CREATININE-BSD FRML MDRD: ABNORMAL ML/MIN/{1.73_M2}
GFR SERPL CREATININE-BSD FRML MDRD: ABNORMAL ML/MIN/{1.73_M2}
GLUCOSE BLD-MCNC: 125 MG/DL (ref 70–99)
GLUCOSE URINE: NEGATIVE
HCT VFR BLD CALC: 40 % (ref 36.3–47.1)
HEMOGLOBIN: 13.2 G/DL (ref 11.9–15.1)
IMMATURE GRANULOCYTES: 0 %
KETONES, URINE: NEGATIVE
LEUKOCYTE ESTERASE, URINE: ABNORMAL
LYMPHOCYTES # BLD: 22 % (ref 24–43)
MCH RBC QN AUTO: 32.1 PG (ref 25.2–33.5)
MCHC RBC AUTO-ENTMCNC: 33 G/DL (ref 25.2–33.5)
MCV RBC AUTO: 97.3 FL (ref 82.6–102.9)
MDMA URINE: NORMAL
METHADONE SCREEN, URINE: NEGATIVE
METHAMPHETAMINE, URINE: NEGATIVE
MONOCYTES # BLD: 8 % (ref 3–12)
MUCUS: ABNORMAL
NITRITE, URINE: NEGATIVE
NRBC AUTOMATED: ABNORMAL PER 100 WBC
OPIATES, URINE: NEGATIVE
OTHER OBSERVATIONS UA: ABNORMAL
OXYCODONE SCREEN URINE: NEGATIVE
PDW BLD-RTO: 12.4 % (ref 11.8–14.4)
PH UA: 7 (ref 5–6)
PHENCYCLIDINE, URINE: NEGATIVE
PLATELET # BLD: 234 K/UL (ref 138–453)
PLATELET ESTIMATE: ABNORMAL
PMV BLD AUTO: 10.2 FL (ref 8.1–13.5)
POTASSIUM SERPL-SCNC: 4.1 MMOL/L (ref 3.7–5.3)
PROPOXYPHENE, URINE: NEGATIVE
PROTEIN UA: NEGATIVE
RBC # BLD: 4.11 M/UL (ref 3.95–5.11)
RBC # BLD: ABNORMAL 10*6/UL
RBC UA: ABNORMAL /HPF (ref 0–4)
RENAL EPITHELIAL, UA: ABNORMAL /HPF
SEG NEUTROPHILS: 67 % (ref 36–65)
SEGMENTED NEUTROPHILS ABSOLUTE COUNT: 5.05 K/UL (ref 1.5–8.1)
SODIUM BLD-SCNC: 140 MMOL/L (ref 135–144)
SPECIFIC GRAVITY UA: 1.02 (ref 1.01–1.02)
TEST INFORMATION: NORMAL
TRICHOMONAS: ABNORMAL
TRICYCLIC ANTIDEPRESSANTS, UR: NEGATIVE
TROPONIN INTERP: NORMAL
TROPONIN INTERP: NORMAL
TROPONIN T: NORMAL NG/ML
TROPONIN T: NORMAL NG/ML
TROPONIN, HIGH SENSITIVITY: 6 NG/L (ref 0–14)
TROPONIN, HIGH SENSITIVITY: <6 NG/L (ref 0–14)
TURBIDITY: ABNORMAL
URINE HGB: NEGATIVE
UROBILINOGEN, URINE: NORMAL
WBC # BLD: 7.6 K/UL (ref 3.5–11.3)
WBC # BLD: ABNORMAL 10*3/UL
WBC UA: ABNORMAL /HPF (ref 0–4)
YEAST: ABNORMAL

## 2020-05-19 PROCEDURE — 80048 BASIC METABOLIC PNL TOTAL CA: CPT

## 2020-05-19 PROCEDURE — 85025 COMPLETE CBC W/AUTO DIFF WBC: CPT

## 2020-05-19 PROCEDURE — 84484 ASSAY OF TROPONIN QUANT: CPT

## 2020-05-19 PROCEDURE — 36415 COLL VENOUS BLD VENIPUNCTURE: CPT

## 2020-05-19 PROCEDURE — 80306 DRUG TEST PRSMV INSTRMNT: CPT

## 2020-05-19 PROCEDURE — 81001 URINALYSIS AUTO W/SCOPE: CPT

## 2020-05-19 PROCEDURE — 93005 ELECTROCARDIOGRAM TRACING: CPT | Performed by: EMERGENCY MEDICINE

## 2020-05-19 PROCEDURE — 99284 EMERGENCY DEPT VISIT MOD MDM: CPT

## 2020-05-19 NOTE — ED PROVIDER NOTES
[facility] ED  [address]  Phone: [phone]  K[place]      Pt Name: Earlene Zuniga  MRN: [id number]  [place] 1960  Date of evaluation: 5/19/2020    CHIEF COMPLAINT       Chief Complaint   Patient presents with    Other     \"feel weird\"; patient unable to explain       HISTORY OF PRESENT ILLNESS    Earlene Zuniga is a 64 y.o. female who presents to the emergency department comes by ambulance after having an episode where she felt lightheaded and felt like her body was going to tense up. Initially called the ambulance with seizure however the patient did not lose consciousness and did not have any seizure activity she said she just felt like her body was going to tighten up. She denies any symptoms currently she is resting comfortably no chest pain or shortness of breath no fevers or chills. No recent illnesses. Denies any other complaints. She says it just feels weird and hard to explain.   REVIEW OF SYSTEMS       Constitutional: No fevers or chills positive lightheadedness resolved  HEENT: No sore throat, rhinorrhea, or earache   Eyes: No blurry vision or double vision no drainage   Cardiovascular: No chest pain or tachycardia   Respiratory: No wheezing or shortness of breath no cough   Gastrointestinal: No nausea, vomiting, diarrhea, constipation, or abdominal pain   : No hematuria or dysuria   Musculoskeletal: No swelling or pain   Skin: No rash   Neurological: No focal neurologic complaints, paresthesias, weakness, or headache     PAST MEDICAL HISTORY    has a past medical history of Cervicogenic headache, Chronic sinusitis, Conductive hearing loss, Degenerative arthritis of cervical spine, Depression, Elevated fasting glucose, Epigastric abdominal pain, GERD (gastroesophageal reflux disease), History of cervical dysplasia, History of methamphetamine abuse ([facility] [address] LSIL (low grade squamous intraepithelial lesion) on Pap smear, and Tobacco abuse.    SURGICAL HISTORY      has a past surgical history that includes Upper gastrointestinal endoscopy (05/02/2013); other surgical history (03/10/1987); Cervix lesion destruction; Cataract removal with implant (Left, 3/22/2016); Cataract removal (Bilateral); and Colonoscopy (03/03/2017). CURRENT MEDICATIONS       Previous Medications    AMOXICILLIN (AMOXIL) 875 MG TABLET    Take 1 tablet by mouth 2 times daily    ATORVASTATIN (LIPITOR) 10 MG TABLET    TAKE 1 TABLET DAILY    BLOOD GLUCOSE MONITORING SUPPL CARY    Diagnosis 250.90, tests once daily    BLOOD PRESSURE KIT    Check blood pressure daily and as needed E11.9 (hypertension)    BLOOD PRESSURE MONITOR CARY    Monitor BP daily; Dx. Hypertension (I10)    BUPROPION (WELLBUTRIN SR) 150 MG EXTENDED RELEASE TABLET    TAKE 1 TABLET TWICE A DAY    CHOLECALCIFEROL (VITAMIN D PO)    Take by mouth    EXENATIDE (BYDUREON) 2 MG PEN    Inject 1 pen into the skin once a week    FLUOXETINE (PROZAC) 20 MG CAPSULE    Take 60 mg by mouth daily    GLUCOSE BLOOD (BLOOD GLUCOSE TEST STRIPS) STRP    Diagnosis 250.90, tests once daily    HYDROCHLOROTHIAZIDE (MICROZIDE) 12.5 MG CAPSULE    Take 1 capsule by mouth every morning    INSULIN PEN NEEDLE ([employer] PEN NEEDLES) 31G X 6 MM MISC    1 each by Does not apply route daily    LAMOTRIGINE (LAMICTAL) 200 MG TABLET    Take 200 mg by mouth daily    LANCETS MISC    Diagnosis 250.90, tests once daily    LISINOPRIL (PRINIVIL;ZESTRIL) 40 MG TABLET    Take 1 tablet by mouth daily    MULTIPLE VITAMINS-MINERALS (MULTIVITAMIN PO)    Take  by mouth daily.     OLANZAPINE (ZYPREXA) 10 MG TABLET        OMEPRAZOLE (PRILOSEC) 20 MG DELAYED RELEASE CAPSULE    Take 1 capsule by mouth daily    ONDANSETRON (ZOFRAN ODT) 4 MG DISINTEGRATING TABLET    Take 1 tablet by mouth every 8 hours as needed for Nausea    SAPHRIS 10 MG SUBL SUBLINGUAL TABLET    nightly        ALLERGIES     is allergic to gabapentin; lyrica [pregabalin]; ciprofloxacin; and keflex BPM    Atrial Rate 90 BPM    P-R Interval 138 ms    QRS Duration 94 ms    Q-T Interval 398 ms    QTc Calculation (Bazett) 486 ms    P Axis 53 degrees    R Axis 59 degrees    T Axis 47 degrees   EKG 12 Lead   Result Value Ref Range    Ventricular Rate 82 BPM    Atrial Rate 82 BPM    P-R Interval 142 ms    QRS Duration 88 ms    Q-T Interval 404 ms    QTc Calculation (Bazett) 472 ms    P Axis 53 degrees    R Axis 57 degrees    T Axis 22 degrees       Not indicated unless otherwise documented above    RADIOLOGY:   I reviewed the radiologist interpretations:    No orders to display       Not indicated unless otherwise documented above    EMERGENCY DEPARTMENT COURSE:     The patient was given the following medications:  No orders of the defined types were placed in this encounter. Vitals:   -------------------------  BP (!) 150/96   Pulse 80   Temp 98.1 °F (36.7 °C) (Tympanic)   Resp 16   Ht 5' 6\" (1.676 m)   Wt 185 lb (83.9 kg)   LMP 08/28/2016   SpO2 97%   BMI 29.86 kg/m²     1:25 PM labs unremarkable. Does not feel as tense as she did before. Normal troponin no urinary tract infection electrolytes no abnormalities. Not anemic. Unclear etiology for episode of lightheadedness and feeling tight all over. She has been under tremendous amount of stress. We will repeat troponin and EKG at 2 PM.    2:55 PM repeat troponin normal.  Repeat EKG no acute changes. Unclear cause to her symptoms she is resting much more comfortably now in no acute distress. Recommend following up with family physician for further testing may need stress test.  Denies chest pain currently. Return if worsening symptoms or other concerns. I have reviewed the disposition diagnosis with the patient and or their family/guardian. I have answered their questions and given discharge instructions. They voiced understanding of these instructions and did not have any furtherquestions or complaints.     CRITICAL

## 2020-05-20 LAB
EKG ATRIAL RATE: 82 BPM
EKG ATRIAL RATE: 90 BPM
EKG P AXIS: 53 DEGREES
EKG P AXIS: 53 DEGREES
EKG P-R INTERVAL: 138 MS
EKG P-R INTERVAL: 142 MS
EKG Q-T INTERVAL: 398 MS
EKG Q-T INTERVAL: 404 MS
EKG QRS DURATION: 88 MS
EKG QRS DURATION: 94 MS
EKG QTC CALCULATION (BAZETT): 472 MS
EKG QTC CALCULATION (BAZETT): 486 MS
EKG R AXIS: 57 DEGREES
EKG R AXIS: 59 DEGREES
EKG T AXIS: 22 DEGREES
EKG T AXIS: 47 DEGREES
EKG VENTRICULAR RATE: 82 BPM
EKG VENTRICULAR RATE: 90 BPM

## 2020-06-15 ENCOUNTER — TELEPHONE (OUTPATIENT)
Dept: FAMILY MEDICINE CLINIC | Age: 60
End: 2020-06-15

## 2020-06-18 RX ORDER — EXENATIDE 2 MG/.65ML
2 INJECTION, SUSPENSION, EXTENDED RELEASE SUBCUTANEOUS WEEKLY
Qty: 5 PEN | Refills: 3 | Status: SHIPPED | OUTPATIENT
Start: 2020-06-18 | End: 2022-05-04

## 2020-08-13 ENCOUNTER — OFFICE VISIT (OUTPATIENT)
Dept: FAMILY MEDICINE CLINIC | Age: 60
End: 2020-08-13
Payer: COMMERCIAL

## 2020-08-13 ENCOUNTER — HOSPITAL ENCOUNTER (OUTPATIENT)
Dept: LAB | Age: 60
Discharge: HOME | End: 2020-08-13
Payer: COMMERCIAL

## 2020-08-13 VITALS
SYSTOLIC BLOOD PRESSURE: 118 MMHG | OXYGEN SATURATION: 96 % | HEIGHT: 66 IN | TEMPERATURE: 97.7 F | HEART RATE: 92 BPM | BODY MASS INDEX: 29.33 KG/M2 | WEIGHT: 182.5 LBS | DIASTOLIC BLOOD PRESSURE: 78 MMHG

## 2020-08-13 LAB
ALBUMIN SERPL-MCNC: 4.9 G/DL (ref 3.5–5.2)
ALBUMIN/GLOBULIN RATIO: 1.7 (ref 1–2.5)
ALP BLD-CCNC: 149 U/L (ref 35–104)
ALT SERPL-CCNC: 33 U/L (ref 5–33)
ANION GAP SERPL CALCULATED.3IONS-SCNC: 12 MMOL/L (ref 9–17)
AST SERPL-CCNC: 29 U/L
BILIRUB SERPL-MCNC: 0.47 MG/DL (ref 0.3–1.2)
BUN BLDV-MCNC: 16 MG/DL (ref 6–20)
BUN/CREAT BLD: 13 (ref 9–20)
CALCIUM SERPL-MCNC: 10.5 MG/DL (ref 8.6–10.4)
CHLORIDE BLD-SCNC: 104 MMOL/L (ref 98–107)
CHOLESTEROL/HDL RATIO: 2.3
CHOLESTEROL: 232 MG/DL
CO2: 26 MMOL/L (ref 20–31)
CREAT SERPL-MCNC: 1.28 MG/DL (ref 0.5–0.9)
ESTIMATED AVERAGE GLUCOSE: 103 MG/DL
GFR AFRICAN AMERICAN: 52 ML/MIN
GFR NON-AFRICAN AMERICAN: 43 ML/MIN
GFR SERPL CREATININE-BSD FRML MDRD: ABNORMAL ML/MIN/{1.73_M2}
GFR SERPL CREATININE-BSD FRML MDRD: ABNORMAL ML/MIN/{1.73_M2}
GLUCOSE BLD-MCNC: 126 MG/DL (ref 70–99)
HBA1C MFR BLD: 5.2 % (ref 4.8–5.9)
HDLC SERPL-MCNC: 99 MG/DL
LDL CHOLESTEROL: 98 MG/DL (ref 0–130)
POTASSIUM SERPL-SCNC: 4.5 MMOL/L (ref 3.7–5.3)
SODIUM BLD-SCNC: 142 MMOL/L (ref 135–144)
TOTAL PROTEIN: 7.8 G/DL (ref 6.4–8.3)
TRIGL SERPL-MCNC: 174 MG/DL
VLDLC SERPL CALC-MCNC: ABNORMAL MG/DL (ref 1–30)

## 2020-08-13 PROCEDURE — 99213 OFFICE O/P EST LOW 20 MIN: CPT | Performed by: NURSE PRACTITIONER

## 2020-08-13 PROCEDURE — 80053 COMPREHEN METABOLIC PANEL: CPT

## 2020-08-13 PROCEDURE — 83036 HEMOGLOBIN GLYCOSYLATED A1C: CPT

## 2020-08-13 PROCEDURE — 36415 COLL VENOUS BLD VENIPUNCTURE: CPT

## 2020-08-13 PROCEDURE — 80061 LIPID PANEL: CPT

## 2020-08-13 RX ORDER — OMEPRAZOLE 20 MG/1
20 CAPSULE, DELAYED RELEASE ORAL DAILY
Qty: 90 CAPSULE | Refills: 1 | Status: SHIPPED | OUTPATIENT
Start: 2020-08-13 | End: 2021-03-18 | Stop reason: SDUPTHER

## 2020-08-13 RX ORDER — ATORVASTATIN CALCIUM 10 MG/1
TABLET, FILM COATED ORAL
Qty: 90 TABLET | Refills: 1 | Status: SHIPPED | OUTPATIENT
Start: 2020-08-13 | End: 2021-03-18 | Stop reason: SDUPTHER

## 2020-08-13 RX ORDER — LISINOPRIL 40 MG/1
40 TABLET ORAL DAILY
Qty: 90 TABLET | Refills: 1 | Status: SHIPPED | OUTPATIENT
Start: 2020-08-13 | End: 2021-01-25

## 2020-08-13 RX ORDER — HYDROCHLOROTHIAZIDE 12.5 MG/1
12.5 CAPSULE, GELATIN COATED ORAL EVERY MORNING
Qty: 90 CAPSULE | Refills: 1 | Status: SHIPPED | OUTPATIENT
Start: 2020-08-13 | End: 2021-03-18 | Stop reason: SDUPTHER

## 2020-08-13 ASSESSMENT — ENCOUNTER SYMPTOMS
SHORTNESS OF BREATH: 0
DIARRHEA: 0
BLURRED VISION: 0
WHEEZING: 0
SORE THROAT: 0
VOMITING: 0
HEARTBURN: 0
COUGH: 0
NAUSEA: 0
HOARSE VOICE: 0

## 2020-08-13 ASSESSMENT — PATIENT HEALTH QUESTIONNAIRE - PHQ9
2. FEELING DOWN, DEPRESSED OR HOPELESS: 1
SUM OF ALL RESPONSES TO PHQ QUESTIONS 1-9: 2
SUM OF ALL RESPONSES TO PHQ QUESTIONS 1-9: 2
1. LITTLE INTEREST OR PLEASURE IN DOING THINGS: 1
SUM OF ALL RESPONSES TO PHQ9 QUESTIONS 1 & 2: 2

## 2020-08-13 NOTE — PROGRESS NOTES
2020     Cassia Phoenix (:  1960) is a 64 y.o. female, here for evaluation of the following medical concerns:    Pt presents to the clinic for a 6 month follow up of chronic medical conditions. She has a history of depression. She is currently on lamictal and zyprexa. She follows with psychiatry. She denies symptoms of depression. She denies thoughts of suicide or homicide. She follows every 3 months with a neurologist about her polyneuropathy. She is a former smoker. She has a history of alcohol abuse. She states that she is down to a few drinks every 3-4 days. She has no concerns today. Diabetes   She presents for her follow-up diabetic visit. She has type 2 diabetes mellitus. Disease course: due for labs. There are no hypoglycemic associated symptoms. Pertinent negatives for hypoglycemia include no dizziness or headaches. There are no diabetic associated symptoms. Pertinent negatives for diabetes include no blurred vision, no chest pain, no fatigue, no foot paresthesias, no polydipsia, no polyuria and no weight loss. There are no hypoglycemic complications. Diabetic complications include peripheral neuropathy. Risk factors for coronary artery disease include diabetes mellitus, dyslipidemia, hypertension, obesity and post-menopausal. Current diabetic treatments: non insulin injectable. She is compliant with treatment most of the time. Her weight is stable. She is following a generally healthy diet. Meal planning includes avoidance of concentrated sweets. She participates in exercise three times a week. An ACE inhibitor/angiotensin II receptor blocker is being taken. She does not see a podiatrist.Eye exam is current. Hypertension   This is a chronic problem. The current episode started more than 1 year ago. The problem is unchanged. The problem is controlled. Pertinent negatives include no anxiety, blurred vision, chest pain, headaches, palpitations, peripheral edema or shortness of breath. There are no associated agents to hypertension. Risk factors for coronary artery disease include diabetes mellitus, dyslipidemia, obesity and post-menopausal state. Past treatments include ACE inhibitors and diuretics. The current treatment provides significant improvement. Compliance problems include diet and exercise. Hyperlipidemia   This is a chronic problem. The current episode started more than 1 year ago. Condition status: due for labs. Exacerbating diseases include diabetes and obesity. Factors aggravating her hyperlipidemia include fatty foods and thiazides. Pertinent negatives include no chest pain, leg pain, myalgias or shortness of breath. Current antihyperlipidemic treatment includes statins. Compliance problems include adherence to diet and adherence to exercise. Risk factors for coronary artery disease include diabetes mellitus, dyslipidemia, hypertension, obesity and post-menopausal.   Gastroesophageal Reflux   She reports no chest pain, no coughing, no heartburn, no hoarse voice, no nausea, no sore throat or no wheezing. This is a chronic problem. The current episode started more than 1 year ago. The problem occurs rarely. The problem has been unchanged. The symptoms are aggravated by certain foods. Pertinent negatives include no fatigue, muscle weakness or weight loss. Risk factors include obesity and ETOH use. She has tried a PPI for the symptoms. The treatment provided moderate relief.        Past Medical History:   Diagnosis Date    Cervicogenic headache     Chronic sinusitis     Conductive hearing loss     Degenerative arthritis of cervical spine     Depression     Elevated fasting glucose     Epigastric abdominal pain     GERD (gastroesophageal reflux disease)     History of cervical dysplasia 1987    LASER    History of methamphetamine abuse ([address]     (Quit 6-2001)    LSIL (low grade squamous intraepithelial lesion) on Pap smear 2002    [place]    Tobacco abuse        Past Surgical History:   Procedure Laterality Date    CATARACT REMOVAL Bilateral     CATARACT REMOVAL WITH IMPLANT Left 3/22/2016    Dr. Alcira Dunlap, [address] COLONOSCOPY  2017    Dr. Heidi No, [address] OTHER SURGICAL HISTORY  03/10/1987    Laser conization of cervix (Hx of moderate dysplasia on Pap test).  UPPER GASTROINTESTINAL ENDOSCOPY  2013    Mild gastritis. Social History     Socioeconomic History    Marital status:      Spouse name: None    Number of children: None    Years of education: None    Highest education level: None   Occupational History    None   Social Needs    Financial resource strain: None    Food insecurity     Worry: None     Inability: None    Transportation needs     Medical: None     Non-medical: None   Tobacco Use    Smoking status: Former Smoker     Packs/day: 0.25     Years: 40.00     Pack years: 10.00     Last attempt to quit: 2019     Years since quittin.3    Smokeless tobacco: Never Used   Substance and Sexual Activity    Alcohol use: Yes     Alcohol/week: 0.0 standard drinks     Comment: socially    Drug use: Yes     Types: Opiates , Marijuana     Comment: Quit . Followed up at the recovery center.     Sexual activity: Yes     Partners: Male     Birth control/protection: Post-menopausal     Comment: Menarche age 17  Menopause age 49   Lifestyle    Physical activity     Days per week: None     Minutes per session: None    Stress: None   Relationships    Social connections     Talks on phone: None     Gets together: None     Attends Yazidism service: None     Active member of club or organization: None     Attends meetings of clubs or organizations: None     Relationship status: None    Intimate partner violence     Fear of current or ex partner: None     Emotionally abused: None     Physically abused: None     Forced sexual activity: None   Other Topics Concern    None   Social History Narrative    None       Family History   Problem Relation Age of Onset    Diabetes Mother           general health deterioration after falling    Osteoporosis Mother     High Cholesterol Father     Lung Cancer Father          age 61    Diabetes Other     Diabetes Other     Diabetes Brother        Allergies   Allergen Reactions    Gabapentin Anaphylaxis    Lyrica [Pregabalin]     Wellbutrin [Bupropion] Other (See Comments)     Seeing double    Ciprofloxacin Other (See Comments)     Stomach pain    Keflex [Cephalexin] Other (See Comments)     Stomach pain       Current Outpatient Medications   Medication Sig Dispense Refill    hydroCHLOROthiazide (MICROZIDE) 12.5 MG capsule Take 1 capsule by mouth every morning 90 capsule 1    lisinopril (PRINIVIL;ZESTRIL) 40 MG tablet Take 1 tablet by mouth daily 90 tablet 1    atorvastatin (LIPITOR) 10 MG tablet TAKE 1 TABLET DAILY 90 tablet 1    omeprazole (PRILOSEC) 20 MG delayed release capsule Take 1 capsule by mouth daily 90 capsule 1    OLANZapine (ZYPREXA) 10 MG tablet   0    lamoTRIgine (LAMICTAL) 200 MG tablet Take 200 mg by mouth daily      Multiple Vitamins-Minerals (MULTIVITAMIN PO) Take  by mouth daily.  Exenatide (BYDUREON) 2 MG PEN Inject 1 pen into the skin once a week 5 pen 3    Blood Pressure KIT Check blood pressure daily and as needed E11.9 (hypertension) 1 kit 0    FLUoxetine (PROZAC) 20 MG capsule Take 60 mg by mouth daily      Blood Pressure Monitor CARY Monitor BP daily; Dx.  Hypertension (I10) 1 Device 0    Insulin Pen Needle ([employer] PEN NEEDLES) 31G X 6 MM MISC 1 each by Does not apply route daily 100 each 3    Glucose Blood (BLOOD GLUCOSE TEST STRIPS) STRP Diagnosis 250.90, tests once daily 50 strip 11    Lancets MISC Diagnosis 250.90, tests once daily 50 each 11    Blood Glucose Monitoring Suppl CARY Diagnosis 250.90, tests once daily 1 Device 0    Cholecalciferol (VITAMIN D PO) Take by mouth      SAPHRIS 10 MG SUBL sublingual tablet nightly        No current facility-administered medications for this visit. Review of Systems   Constitutional: Negative for activity change, appetite change, fatigue and weight loss. HENT: Negative for hoarse voice and sore throat. Eyes: Negative for blurred vision. Respiratory: Negative for cough, shortness of breath and wheezing. Cardiovascular: Negative for chest pain and palpitations. Gastrointestinal: Negative for diarrhea, heartburn, nausea and vomiting. Endocrine: Negative for polydipsia and polyuria. Musculoskeletal: Negative for myalgias and muscle weakness. Skin: Negative. Neurological: Negative for dizziness, light-headedness and headaches. Psychiatric/Behavioral: Negative. Prior to Visit Medications    Medication Sig Taking? Authorizing Provider   hydroCHLOROthiazide (MICROZIDE) 12.5 MG capsule Take 1 capsule by mouth every morning Yes SAEED Arroyo CNP   lisinopril (PRINIVIL;ZESTRIL) 40 MG tablet Take 1 tablet by mouth daily Yes SAEED Arroyo CNP   atorvastatin (LIPITOR) 10 MG tablet TAKE 1 TABLET DAILY Yes SAEED Arroyo CNP   omeprazole (PRILOSEC) 20 MG delayed release capsule Take 1 capsule by mouth daily Yes SAEED Arroyo CNP   OLANZapine (ZYPREXA) 10 MG tablet  Yes Historical Provider, MD   lamoTRIgine (LAMICTAL) 200 MG tablet Take 200 mg by mouth daily Yes Historical Provider, MD   Multiple Vitamins-Minerals (MULTIVITAMIN PO) Take  by mouth daily.  Yes Historical Provider, MD   Exenatide (BYDUREON) 2 MG PEN Inject 1 pen into the skin once a week  SAEED Arroyo CNP   Blood Pressure KIT Check blood pressure daily and as needed E11.9 (hypertension)  SAEED Arroyo CNP   FLUoxetine (PROZAC) 20 MG capsule Take 60 mg by mouth daily  Historical Provider, MD   Blood Pressure Monitor CARY Monitor BP daily; Dx. Hypertension (I10)  Eric Borjas MD   Insulin Pen Needle ([employer] PEN NEEDLES) 31G X 6 MM MISC 1 each by Does not apply route daily  SAEED Ladd CNP   Glucose Blood (BLOOD GLUCOSE TEST STRIPS) STRP Diagnosis 250.90, tests once daily  SAEED Ladd CNP   Lancets MISC Diagnosis 250.90, tests once daily  SAEED Ladd CNP   Blood Glucose Monitoring Suppl CARY Diagnosis 250.90, tests once daily  SAEED Ladd CNP   Cholecalciferol (VITAMIN D PO) Take by mouth  Historical Provider, MD   SAPHRIS 10 MG SUBL sublingual tablet nightly   Historical Provider, MD        Social History     Tobacco Use    Smoking status: Former Smoker     Packs/day: 0.25     Years: 40.00     Pack years: 10.00     Last attempt to quit: 2019     Years since quittin.3    Smokeless tobacco: Never Used   Substance Use Topics    Alcohol use: Yes     Alcohol/week: 0.0 standard drinks     Comment: socially        Vitals:    20 1110   BP: 118/78   Site: Right Upper Arm   Position: Sitting   Cuff Size: Large Adult   Pulse: 92   Temp: 97.7 °F (36.5 °C)   SpO2: 96%   Weight: 182 lb 8 oz (82.8 kg)   Height: 5' 6.4\" (1.687 m)     Estimated body mass index is 29.1 kg/m² as calculated from the following:    Height as of this encounter: 5' 6.4\" (1.687 m). Weight as of this encounter: 182 lb 8 oz (82.8 kg). Physical Exam  Vitals signs and nursing note reviewed. Constitutional:       General: She is not in acute distress. Appearance: Normal appearance. She is well-developed. She is obese. She is not diaphoretic. HENT:      Head: Normocephalic and atraumatic. Eyes:      Pupils: Pupils are equal, round, and reactive to light. Neck:      Musculoskeletal: Normal range of motion and neck supple. Cardiovascular:      Rate and Rhythm: Normal rate and regular rhythm. Heart sounds: Normal heart sounds.    Pulmonary: Effort: Pulmonary effort is normal.      Breath sounds: Normal breath sounds. Abdominal:      General: Bowel sounds are normal.      Palpations: Abdomen is soft. Skin:     General: Skin is warm and dry. Capillary Refill: Capillary refill takes less than 2 seconds. Neurological:      General: No focal deficit present. Mental Status: She is alert and oriented to person, place, and time. Deep Tendon Reflexes: Reflexes are normal and symmetric. Psychiatric:         Mood and Affect: Mood normal.         Behavior: Behavior normal.         Thought Content: Thought content normal.         Judgment: Judgment normal.       Diabetic foot check: Normal strength and range of motion of toes, feet, and ankles bilaterally. No joint deformity. No cyanosis or clubbing. 100% sensation at all 22 test points with the 10 gram filament. Dorsalis pedis pulses intact bilaterally. Capillary refill at the toes was less than 2 seconds. Light touch sensation intact bilaterally. . No skin breakdown, erythema, rub spots, blisters, scaling, or ulcers. Large callus on the right great toe. Refuses podiatry    ASSESSMENT/PLAN:  1. Type 2 diabetes mellitus without complication, unspecified whether long term insulin use (HCC)  Due for labs  Continue current medications at this time  -  DIABETES FOOT EXAM  - Hemoglobin A1C; Future    2. Essential hypertension  Stable continue current medication   - hydroCHLOROthiazide (MICROZIDE) 12.5 MG capsule; Take 1 capsule by mouth every morning  Dispense: 90 capsule; Refill: 1  - lisinopril (PRINIVIL;ZESTRIL) 40 MG tablet; Take 1 tablet by mouth daily  Dispense: 90 tablet; Refill: 1    3. Mixed hyperlipidemia  Due for labs  Continue current medication   - atorvastatin (LIPITOR) 10 MG tablet; TAKE 1 TABLET DAILY  Dispense: 90 tablet; Refill: 1    4.  Gastroesophageal reflux disease without esophagitis  Stable continue current medications  - omeprazole (PRILOSEC) 20 MG delayed release capsule; Take 1 capsule by mouth daily  Dispense: 90 capsule; Refill: 1    5. Depression, unspecified depression type  Stable continue with psychiatry     6. History of tobacco abuse  Continue tobacco cessation    Will call with results in 6 months for follow up   Pt to return PRN       Return in about 6 months (around 2/13/2021), or if symptoms worsen or fail to improve. An electronic signature was used to authenticate this note.     --SAEED Silva - CNP on 8/13/2020 at 1:13 PM

## 2020-10-27 ENCOUNTER — OFFICE VISIT (OUTPATIENT)
Dept: PRIMARY CARE CLINIC | Age: 60
End: 2020-10-27
Payer: COMMERCIAL

## 2020-10-27 VITALS
WEIGHT: 189.2 LBS | RESPIRATION RATE: 16 BRPM | HEART RATE: 84 BPM | SYSTOLIC BLOOD PRESSURE: 130 MMHG | DIASTOLIC BLOOD PRESSURE: 86 MMHG | HEIGHT: 66 IN | BODY MASS INDEX: 30.41 KG/M2

## 2020-10-27 PROCEDURE — 69209 REMOVE IMPACTED EAR WAX UNI: CPT | Performed by: FAMILY MEDICINE

## 2020-10-27 ASSESSMENT — PATIENT HEALTH QUESTIONNAIRE - PHQ9
SUM OF ALL RESPONSES TO PHQ QUESTIONS 1-9: 0
SUM OF ALL RESPONSES TO PHQ QUESTIONS 1-9: 0
2. FEELING DOWN, DEPRESSED OR HOPELESS: 0
1. LITTLE INTEREST OR PLEASURE IN DOING THINGS: 0
SUM OF ALL RESPONSES TO PHQ9 QUESTIONS 1 & 2: 0
SUM OF ALL RESPONSES TO PHQ QUESTIONS 1-9: 0

## 2020-10-27 NOTE — PROGRESS NOTES
Left ear wash done with warm water. Moderate amount of cerumen removed. Patient tolerated procedure well.

## 2020-10-27 NOTE — PROGRESS NOTES
Patient had \"popping noise\" in her left ear for 3 weeks. She attempted to clean her ear last night with a Q tip but caused her ear to plug up.

## 2020-10-27 NOTE — PROGRESS NOTES
[facility]             [address] [place] [address] [place], [address]                        Telephone [phone]             Fax [phone]       Megha Zelaya  1960  MRN:  [id number]  Date of visit:  10/27/2020     Subjective:    Yeimi Cruz is a 64 y.o.  female who presents to [facility] today (10/27/2020) for evaluation of:  Hearing Problem      She states that she has had a \"popping\" in the left ear. She used a Q-tip to clean the left ear last night, and now the ear feels plugged and she has decreased hearing. She denies ear pain. She denies symptoms in the right ear. She denies fever, sore throat, or sinus symptoms. Current medications are:  Current Outpatient Medications   Medication Sig Dispense Refill    hydroCHLOROthiazide (MICROZIDE) 12.5 MG capsule Take 1 capsule by mouth every morning 90 capsule 1    lisinopril (PRINIVIL;ZESTRIL) 40 MG tablet Take 1 tablet by mouth daily 90 tablet 1    atorvastatin (LIPITOR) 10 MG tablet TAKE 1 TABLET DAILY 90 tablet 1    omeprazole (PRILOSEC) 20 MG delayed release capsule Take 1 capsule by mouth daily 90 capsule 1    Blood Pressure KIT Check blood pressure daily and as needed E11.9 (hypertension) 1 kit 0    OLANZapine (ZYPREXA) 10 MG tablet   0    FLUoxetine (PROZAC) 20 MG capsule Take 60 mg by mouth daily      Blood Pressure Monitor CARY Monitor BP daily; Dx.  Hypertension (I10) 1 Device 0    Insulin Pen Needle ([employer] PEN NEEDLES) 31G X 6 MM MISC 1 each by Does not apply route daily 100 each 3    Glucose Blood (BLOOD GLUCOSE TEST STRIPS) STRP Diagnosis 250.90, tests once daily 50 strip 11    Lancets MISC Diagnosis 250.90, tests once daily 50 each 11    lamoTRIgine (LAMICTAL) 200 MG tablet Take 200 mg by mouth daily      Blood Glucose Monitoring Suppl CARY Diagnosis 250.90, tests once daily 1 Device 0    Cholecalciferol (VITAMIN D PO) Take by mouth  SAPHRIS 10 MG SUBL sublingual tablet nightly       Multiple Vitamins-Minerals (MULTIVITAMIN PO) Take  by mouth daily.  Exenatide (BYDUREON) 2 MG PEN Inject 1 pen into the skin once a week 5 pen 3     No current facility-administered medications for this visit. She is allergic to gabapentin; lyrica [pregabalin]; wellbutrin [bupropion]; ciprofloxacin; and keflex [cephalexin]. She has the following problem list:  Patient Active Problem List   Diagnosis    Depression    GERD (gastroesophageal reflux disease)    Tobacco abuse    Type 2 diabetes mellitus without complication ([address]    Left cataract    Alcohol abuse    Obstructive sleep apnea syndrome    Mixed hyperlipidemia    Essential hypertension    Chronic sinusitis    Polyneuropathy associated with underlying disease ([address]    Peripheral arterial disease ([facility] [address]        She  reports that she has been smoking. She has a 10.00 pack-year smoking history. She has never used smokeless tobacco.      Objective:    Vitals:    10/27/20 1245   BP: 130/86   Site: Right Upper Arm   Position: Sitting   Cuff Size: Large Adult   Pulse: 84   Resp: 16   Weight: 189 lb 3.2 oz (85.8 kg)   Height: 5' 6\" (1.676 m)              Body mass index is 30.54 kg/m². Obese  female healthy-appearing, alert and cooperative. The right external auditory canal and tympanic membrane are clear. There is cerumen in the left external auditory canal.  After irrigation, the left external auditory canal is erythematous. The left tympanic membrane is clear. Oropharynx has no erythema. There is no exudate. Neck supple. No adenopathy. Chest:  Normal expansion. Clear to auscultation. No rales, rhonchi, or wheezing. Heart sounds are normal.  Regular rate and rhythm without murmur, gallop or rub.       Assessment & Plan:    Impacted cerumen, left ear  - IA REMOVAL IMPACTED CERUMEN IRRIGATION/LVG UNILAT    The cerumen was cleared from the left external auditory canal

## 2021-01-25 DIAGNOSIS — I10 ESSENTIAL HYPERTENSION: ICD-10-CM

## 2021-01-25 RX ORDER — LISINOPRIL 40 MG/1
TABLET ORAL
Qty: 90 TABLET | Refills: 1 | Status: SHIPPED | OUTPATIENT
Start: 2021-01-25 | End: 2021-03-19 | Stop reason: SDUPTHER

## 2021-01-25 NOTE — TELEPHONE ENCOUNTER
Fay Aldridge called requesting a refill of the below medication which has been pended for you:     Requested Prescriptions     Pending Prescriptions Disp Refills    lisinopril (PRINIVIL;ZESTRIL) 40 MG tablet [Pharmacy Med Name: LISINOPRIL TABS 40MG] 90 tablet 3     Sig: TAKE 1 TABLET DAILY       Last Appointment Date: 8/13/2020  Next Appointment Date: 2/12/2021    Allergies   Allergen Reactions    Gabapentin Anaphylaxis    Lyrica [Pregabalin]     Wellbutrin [Bupropion] Other (See Comments)     Seeing double    Ciprofloxacin Other (See Comments)     Stomach pain    Keflex [Cephalexin] Other (See Comments)     Stomach pain

## 2021-02-17 ENCOUNTER — TELEPHONE (OUTPATIENT)
Dept: OBGYN | Age: 61
End: 2021-02-17

## 2021-02-22 ENCOUNTER — OFFICE VISIT (OUTPATIENT)
Dept: PRIMARY CARE CLINIC | Age: 61
End: 2021-02-22
Payer: COMMERCIAL

## 2021-02-22 VITALS
RESPIRATION RATE: 18 BRPM | DIASTOLIC BLOOD PRESSURE: 76 MMHG | HEIGHT: 66 IN | WEIGHT: 175.6 LBS | HEART RATE: 110 BPM | BODY MASS INDEX: 28.22 KG/M2 | SYSTOLIC BLOOD PRESSURE: 132 MMHG | OXYGEN SATURATION: 96 % | TEMPERATURE: 97.1 F

## 2021-02-22 DIAGNOSIS — H69.81 EUSTACHIAN TUBE DYSFUNCTION, RIGHT: Primary | ICD-10-CM

## 2021-02-22 DIAGNOSIS — H92.01 OTALGIA OF RIGHT EAR: ICD-10-CM

## 2021-02-22 PROCEDURE — 99213 OFFICE O/P EST LOW 20 MIN: CPT | Performed by: NURSE PRACTITIONER

## 2021-02-22 RX ORDER — PREDNISONE 20 MG/1
40 TABLET ORAL DAILY
Qty: 10 TABLET | Refills: 0 | Status: SHIPPED | OUTPATIENT
Start: 2021-02-22 | End: 2021-02-27

## 2021-02-22 ASSESSMENT — ENCOUNTER SYMPTOMS
SINUS PRESSURE: 0
COUGH: 0
ABDOMINAL PAIN: 0
RHINORRHEA: 0
DIARRHEA: 0
RESPIRATORY NEGATIVE: 1
SORE THROAT: 0
GASTROINTESTINAL NEGATIVE: 1
VOMITING: 0

## 2021-02-22 ASSESSMENT — PATIENT HEALTH QUESTIONNAIRE - PHQ9
SUM OF ALL RESPONSES TO PHQ QUESTIONS 1-9: 2
SUM OF ALL RESPONSES TO PHQ QUESTIONS 1-9: 2

## 2021-02-22 NOTE — PATIENT INSTRUCTIONS
Even though infection is a common cause of ear pain, not all ear pain means an infection. If you have ear pain and don't have an infection, it could be because of a jaw problem, such as temporomandibular joint (TMJ) pain. Or it could be because of a neck problem. When ear discomfort or pain is mild or comes and goes without other symptoms, home treatment may be all you need. Follow-up care is a key part of your treatment and safety. Be sure to make and go to all appointments, and call your doctor if you are having problems. It's also a good idea to know your test results and keep a list of the medicines you take. How can you care for yourself at home? · Apply heat on the ear to ease pain. To apply heat, put a warm water bottle, a heating pad set on low, or a warm cloth on your ear. Do not go to sleep with a heating pad on your skin. · Take an over-the-counter pain medicine, such as acetaminophen (Tylenol), ibuprofen (Advil, Motrin), or naproxen (Aleve). Be safe with medicines. Read and follow all instructions on the label. · Do not take two or more pain medicines at the same time unless the doctor told you to. Many pain medicines have acetaminophen, which is Tylenol. Too much acetaminophen (Tylenol) can be harmful. · Never insert anything, such as a cotton swab or a pillo pin, into the ear. When should you call for help? Call your doctor now or seek immediate medical care if:    · You have new or worse symptoms of infection, such as:  ? Increased pain, swelling, warmth, or redness. ? Red streaks leading from the area. ? Pus draining from the area. ? A fever. Watch closely for changes in your health, and be sure to contact your doctor if:    · You have new or worse discharge coming from the ear.     · You do not get better as expected. Where can you learn more? Go to [email or web address] and sign in to your Real[employer] account. Enter D182 in the [employer]alN[employer]ash[employer] box to learn more about \"Earache: Care Instructions. \"     If you do not have an account, please click on the \"Sign Up Now\" link. Current as of: April 15, 2020               Content Version: 12.6  © 4988-1207 [employer], [facility]. Care instructions adapted under license by [facility] ([facility]). If you have questions about a medical condition or this instruction, always ask your healthcare professional. [address] any warranty or liability for your use of this information.

## 2021-02-22 NOTE — PROGRESS NOTES
[facility]             [address] [place] [address]                        Telephone [phone]             Fax [phone]     Ce Hopkins  1960  MFE:[id number]   Date of visit:  2/22/2021    Subjective:    Richa Salazar is a 63 y.o.  female who presents to [facility] today (2/22/2021) for evaluation of:    Chief Complaint   Patient presents with    Other     erick.ear pain. Jan . 4th had teeth removed and had problems since jan 4th ,dentist suggested she get a CT scan       Otalgia   There is pain in the right ear. This is a new problem. The current episode started more than 1 month ago (X 6 weeks; began 1-2 weeks after extraction of 2 back teeth (wisdom tooth and molar) on right lower jaw and was treated for dry socket). The problem occurs constantly. The problem has been unchanged. There has been no fever. The pain is at a severity of 4/10. Pertinent negatives include no abdominal pain, coughing, diarrhea, ear discharge, headaches, rash, rhinorrhea, sore throat or vomiting. Associated symptoms comments: C/o pressure in right ear and pain in right temple when she swallows. She has tried acetaminophen and NSAIDs (norco) for the symptoms. The treatment provided no relief.        She has the following problem list:  Patient Active Problem List   Diagnosis    Depression    GERD (gastroesophageal reflux disease)    Tobacco abuse    Type 2 diabetes mellitus without complication ([address]    Left cataract    Alcohol abuse    Obstructive sleep apnea syndrome    Mixed hyperlipidemia    Essential hypertension    Chronic sinusitis    Polyneuropathy associated with underlying disease ([address]    Peripheral arterial disease ([address]        Current medications are:  Current Outpatient Medications   Medication Sig Dispense Refill Body mass index is 28.34 kg/m². Review of Systems   Constitutional: Positive for appetite change. Negative for chills, fatigue and fever. HENT: Positive for ear pain. Negative for ear discharge, postnasal drip, rhinorrhea, sinus pressure and sore throat. Respiratory: Negative. Negative for cough. Cardiovascular: Negative. Gastrointestinal: Negative. Negative for abdominal pain, diarrhea and vomiting. Skin: Negative for rash. Neurological: Negative for headaches. Physical Exam  Vitals signs and nursing note reviewed. Constitutional:       Appearance: She is well-developed. HENT:      Head: Normocephalic. Jaw: There is normal jaw occlusion. No tenderness, swelling or pain on movement. Right Ear: Ear canal and external ear normal. A middle ear effusion is present. Left Ear: Tympanic membrane, ear canal and external ear normal.      Nose: Nose normal.      Mouth/Throat:      Lips: Pink. Mouth: Mucous membranes are moist.      Pharynx: Oropharynx is clear. Uvula midline. Eyes:      Pupils: Pupils are equal, round, and reactive to light. Neck:      Musculoskeletal: Normal range of motion and neck supple. Cardiovascular:      Rate and Rhythm: Normal rate and regular rhythm. Heart sounds: Normal heart sounds. Pulmonary:      Effort: Pulmonary effort is normal.      Breath sounds: Normal breath sounds and air entry. Lymphadenopathy:      Cervical: No cervical adenopathy. Skin:     General: Skin is warm and dry. Neurological:      General: No focal deficit present. Mental Status: She is alert and oriented to person, place, and time. Psychiatric:         Behavior: Behavior normal.         Thought Content: Thought content normal.       Assessment and Plan:    No results found for this visit on 02/22/21. Diagnosis Orders   1.  Eustachian tube dysfunction, right  predniSONE (DELTASONE) 20 MG tablet 2. Otalgia of right ear  predniSONE (DELTASONE) 20 MG tablet       Take prednisone as directed. Take Tylenol as needed for pain. I discussed with patient and PCP of possible CT head or ENT referral if symptoms persist. Follow up with PCP if symptoms persist.    The use, risks, benefits, and side effects of prescribed or recommended medications were discussed. All questions were answered and the patient/caregiver voiced understanding. No orders of the defined types were placed in this encounter.         Electronically signed by SAEED Klein CNP on 2/22/21 at 3:25 PM EST

## 2021-02-26 ENCOUNTER — TELEPHONE (OUTPATIENT)
Dept: FAMILY MEDICINE CLINIC | Age: 61
End: 2021-02-26

## 2021-02-26 NOTE — TELEPHONE ENCOUNTER
Pt calling stating she was seen by Kristel Feldman for her ear and given a steroid, pt states she's finished the steroid and states she's no better, dno you want to order a CT or MRI, please advise at above number.

## 2021-03-01 ENCOUNTER — HOSPITAL ENCOUNTER (OUTPATIENT)
Dept: MAMMOGRAPHY | Age: 61
Discharge: HOME | End: 2021-03-03
Payer: COMMERCIAL

## 2021-03-01 DIAGNOSIS — Z12.31 OTHER SCREENING MAMMOGRAM: ICD-10-CM

## 2021-03-01 PROCEDURE — 77063 BREAST TOMOSYNTHESIS BI: CPT

## 2021-03-01 PROCEDURE — 77067 SCR MAMMO BI INCL CAD: CPT

## 2021-03-02 ENCOUNTER — OFFICE VISIT (OUTPATIENT)
Dept: OTOLARYNGOLOGY | Age: 61
End: 2021-03-02
Payer: COMMERCIAL

## 2021-03-02 VITALS
WEIGHT: 174 LBS | SYSTOLIC BLOOD PRESSURE: 112 MMHG | BODY MASS INDEX: 27.97 KG/M2 | HEIGHT: 66 IN | DIASTOLIC BLOOD PRESSURE: 78 MMHG | HEART RATE: 88 BPM

## 2021-03-02 DIAGNOSIS — H93.8X1 FULLNESS IN EAR, RIGHT: ICD-10-CM

## 2021-03-02 DIAGNOSIS — R51.9 RIGHT FACIAL PAIN: Primary | ICD-10-CM

## 2021-03-02 DIAGNOSIS — M26.609 TMJ (TEMPOROMANDIBULAR JOINT DISORDER): ICD-10-CM

## 2021-03-02 PROCEDURE — 31231 NASAL ENDOSCOPY DX: CPT | Performed by: OTOLARYNGOLOGY

## 2021-03-02 PROCEDURE — 99204 OFFICE O/P NEW MOD 45 MIN: CPT | Performed by: OTOLARYNGOLOGY

## 2021-03-04 ENCOUNTER — OFFICE VISIT (OUTPATIENT)
Dept: OBGYN | Age: 61
End: 2021-03-04
Payer: COMMERCIAL

## 2021-03-04 ENCOUNTER — HOSPITAL ENCOUNTER (OUTPATIENT)
Age: 61
Setting detail: SPECIMEN
Discharge: HOME | End: 2021-03-04
Payer: COMMERCIAL

## 2021-03-04 VITALS
HEIGHT: 66 IN | TEMPERATURE: 97.9 F | WEIGHT: 175 LBS | DIASTOLIC BLOOD PRESSURE: 64 MMHG | HEART RATE: 90 BPM | BODY MASS INDEX: 28.12 KG/M2 | SYSTOLIC BLOOD PRESSURE: 118 MMHG

## 2021-03-04 DIAGNOSIS — Z12.31 OTHER SCREENING MAMMOGRAM: ICD-10-CM

## 2021-03-04 DIAGNOSIS — Z12.4 SCREENING FOR CERVICAL CANCER: ICD-10-CM

## 2021-03-04 DIAGNOSIS — Z01.419 WELL FEMALE EXAM WITH ROUTINE GYNECOLOGICAL EXAM: Primary | ICD-10-CM

## 2021-03-04 PROCEDURE — 99396 PREV VISIT EST AGE 40-64: CPT | Performed by: NURSE PRACTITIONER

## 2021-03-04 PROCEDURE — G0145 SCR C/V CYTO,THINLAYER,RESCR: HCPCS

## 2021-03-04 NOTE — PROGRESS NOTES
[facility]  3/4/2021              63 y.o. Chief Complaint   Patient presents with    Gynecologic Exam         Patient's last menstrual period was 2016. No referring provider defined for this encounter. HPI :Annual Exam  Patient presents for annual exam.  Counseling on healthy lifestyle reviewed, as well as the need for self breast exam. We reviewed the need for Kegal exercises and literature was provided. We discussed and reviewed the need for routine screenings and immunization updates when appropriate. Continues to smoke about 1/4 pack per day    Performs monthly self breast exams. Denies vaginal bleeding or spotting and is aware of the need to report any she would note in the future. The patient is not sexually active. last pap: was normal, last mammogram: was normal  The patient has regular exercise: yes .  We did review the need for and frequency of both weight bearing and strengthening as tolerated by the patient. ________________________________________________________________________  [facility] History    Para Term  AB Living   0 0 0 0 0 0   SAB TAB Ectopic Molar Multiple Live Births   0 0 0 0 0 0     Past Medical History:   Diagnosis Date    Cervicogenic headache     Chronic sinusitis     Conductive hearing loss     Degenerative arthritis of cervical spine     Depression     Elevated fasting glucose     Epigastric abdominal pain     GERD (gastroesophageal reflux disease)     History of cervical dysplasia     LASER    History of methamphetamine abuse ([facility]     (Quit -)    LSIL (low grade squamous intraepithelial lesion) on Pap smear     Cryo    Tobacco abuse                                                                    Past Surgical History:   Procedure Laterality Date    CATARACT REMOVAL Bilateral     CATARACT REMOVAL WITH IMPLANT Left 3/22/2016    Dr. Micah Jean [place]  COLONOSCOPY  2017    Dr. Nakia Ward, [address] OTHER SURGICAL HISTORY  03/10/1987    Laser conization of cervix (Hx of moderate dysplasia on Pap test).  UPPER GASTROINTESTINAL ENDOSCOPY  2013    Mild gastritis. Family History   Problem Relation Age of Onset    Diabetes Mother          200 general health deterioration after falling    Osteoporosis Mother     High Cholesterol Father     Lung Cancer Father          age 61    Diabetes Other     Diabetes Other     Diabetes Brother      Social History     Socioeconomic History    Marital status:      Spouse name: Not on file    Number of children: Not on file    Years of education: Not on file    Highest education level: Not on file   Occupational History    Not on file   Social Needs    Financial resource strain: Not on file    Food insecurity     Worry: Not on file     Inability: Not on file    Transportation needs     Medical: Not on file     Non-medical: Not on file   Tobacco Use    Smoking status: Current Every Day Smoker     Packs/day: 0.25     Years: 40.00     Pack years: 10.00    Smokeless tobacco: Never Used   Substance and Sexual Activity    Alcohol use: Yes     Alcohol/week: 0.0 standard drinks     Comment: socially    Drug use: Yes     Types: Opiates , Marijuana     Comment: Quit . Followed up at the recovery center.     Sexual activity: Yes     Partners: Male     Birth control/protection: Post-menopausal     Comment: Menarche age 17  Menopause age 49   Lifestyle    Physical activity     Days per week: Not on file     Minutes per session: Not on file    Stress: Not on file   Relationships    Social connections     Talks on phone: Not on file     Gets together: Not on file     Attends Rastafarian service: Not on file     Active member of club or organization: Not on file     Attends meetings of clubs or organizations: Not on file     Relationship status: Not on file  Intimate partner violence     Fear of current or ex partner: Not on file     Emotionally abused: Not on file     Physically abused: Not on file     Forced sexual activity: Not on file   Other Topics Concern    Not on file   Social History Narrative    Not on file       MEDICATIONS:  Current Outpatient Medications   Medication Sig Dispense Refill    lisinopril (PRINIVIL;ZESTRIL) 40 MG tablet TAKE 1 TABLET DAILY 90 tablet 1    hydroCHLOROthiazide (MICROZIDE) 12.5 MG capsule Take 1 capsule by mouth every morning 90 capsule 1    atorvastatin (LIPITOR) 10 MG tablet TAKE 1 TABLET DAILY 90 tablet 1    omeprazole (PRILOSEC) 20 MG delayed release capsule Take 1 capsule by mouth daily 90 capsule 1    Blood Pressure KIT Check blood pressure daily and as needed E11.9 (hypertension) 1 kit 0    OLANZapine (ZYPREXA) 10 MG tablet   0    FLUoxetine (PROZAC) 20 MG capsule Take 60 mg by mouth daily      Blood Pressure Monitor CARY Monitor BP daily; Dx. Hypertension (I10) 1 Device 0    Insulin Pen Needle ([employer] PEN NEEDLES) 31G X 6 MM MISC 1 each by Does not apply route daily 100 each 3    Glucose Blood (BLOOD GLUCOSE TEST STRIPS) STRP Diagnosis 250.90, tests once daily 50 strip 11    Lancets MISC Diagnosis 250.90, tests once daily 50 each 11    lamoTRIgine (LAMICTAL) 200 MG tablet Take 200 mg by mouth daily      Blood Glucose Monitoring Suppl CARY Diagnosis 250.90, tests once daily 1 Device 0    Cholecalciferol (VITAMIN D PO) Take by mouth      SAPHRIS 10 MG SUBL sublingual tablet nightly       Multiple Vitamins-Minerals (MULTIVITAMIN PO) Take  by mouth daily.  Exenatide (BYDUREON) 2 MG PEN Inject 1 pen into the skin once a week 5 pen 3     No current facility-administered medications for this visit.         ALLERGIES:  Allergies as of 03/04/2021 - Review Complete 03/04/2021   Allergen Reaction Noted    Gabapentin Anaphylaxis 03/21/2019    Lyrica [pregabalin]  04/30/2019  Wellbutrin [bupropion] Other (See Comments) 08/13/2020    Ciprofloxacin Other (See Comments) 05/15/2018    Keflex [cephalexin] Other (See Comments) 05/15/2018           Gynecologic History:  Menarche: 15   Menopause at 49   Patient's last menstrual period was 08/28/2016. Hormone Exposure: No    Family History of Breast, Ovarian , Colon or Uterine Cancer: No     Preventative Health Testing:  Date of Last Pap Smear: 2020  Date of Last Mammogram: 2020    ________________________________________________________________________      Review Of Systems:  General ROS:  negative  Hematological and Lymphatic ROS:negative   Breast ROS: negative  Cardiovascular ROS: negative  Respiratory ROS: negative   Gastrointestinal ROS: negative  Genito-Urinary ROS: negative  Psychological ROS: negative  Neurological ROS: negative  Musculoskeletal ROS: negative  Dermatological ROS: negative                                                                                                                                                                                   PHYSICAL Exam:     Constitutional:  Blood pressure 118/64, pulse 90, temperature 97.9 °F (36.6 °C), temperature source Temporal, height 5' 6\" (1.676 m), weight 175 lb (79.4 kg), last menstrual period 08/28/2016, not currently breastfeeding. General Appearance: This  is a well Developed, well Nourished, well groomed female. Her BMI was reviewed. Skin:  There was a Normal Inspection of the skin without rashes or lesions. Lymphatic:  No Lymph Nodes were Palpable in the neck , axilla or groin. Neck and EENT:  The neck was supple. There were no masses   The thyroid was not enlarged and had no masses. PERRLA, Nares Patent No Masses    Respiratory: The lungs were auscultated and found to be clear. There were no rales, rhonchi or wheezes. There was a good respiratory effort.     Cardiovascular: The heart was in a regular rate and rhythm. There was no murmur appreciated. Extremities: The patients extremities were without calf tenderness, edema, or varicosities. There was full range of motion in all four extremities. Pulses in all four extremities    Abdomen: The abdomen was soft and non-tender. There was no guarding, rebound or rigidity. On evaluation there was no evidence of hepatosplenomegaly and there was no costal vertebral kay tenderness bilaterally. No hernias were appreciated. Psych: The patient had a normal Orientation to: Time, Place, Person, and Situation  Mood and affect appropriate    Breast:  (Chest)  normal appearance, no masses or tenderness, Inspection negative, No nipple retraction or dimpling, No nipple discharge or bleeding, No axillary or supraclavicular adenopathy, Normal to palpation without dominant masses      Pelvic Exam:  External genitalia: normal general appearance, skin tag left buttock  Urinary system: urethral meatus normal  Vaginal: atrophic mucosa  Cervix: normal appearance and thin prep PAP obtained  Adnexa: normal bimanual exam and non palpable  Uterus: normal single, nontender    Musculosk:  Normal Gait and station was noted. Digits were evaluated without abnormal findings. Range of motion, stability and strength were evaluated and found to be appropriate for the patients age. ASSESSMENT:      63 y.o. Annual   Diagnosis Orders   1. Well female exam with routine gynecological exam     2. Screening for cervical cancer  PAP SMEAR   3.  Other screening mammogram  SAL JONAH DIGITAL SCREEN BILATERAL        Chief Complaint   Patient presents with    Gynecologic Exam         Past Medical History:   Diagnosis Date    Cervicogenic headache     Chronic sinusitis     Conductive hearing loss     Degenerative arthritis of cervical spine     Depression     Elevated fasting glucose     Epigastric abdominal pain     GERD (gastroesophageal reflux disease)  History of cervical dysplasia 1987    LASER    History of methamphetamine abuse ([facility]     (Quit 6-2001)    LSIL (low grade squamous intraepithelial lesion) on Pap smear 2002    Cryo    Tobacco abuse          Patient Active Problem List   Diagnosis    Depression    GERD (gastroesophageal reflux disease)    Tobacco abuse    Type 2 diabetes mellitus without complication ([facility]    Left cataract    Alcohol abuse    Obstructive sleep apnea syndrome    Mixed hyperlipidemia    Essential hypertension    Chronic sinusitis    Polyneuropathy associated with underlying disease ([facility]    Peripheral arterial disease ([address]          Hereditary Breast, Ovarian, Colon and Uterine Cancer screening Done. Tobacco & Secondary smoke risks reviewed; instructed on cessation and avoidance    PLAN:  Return in about 1 year (around 3/4/2022) for Annual Exam, Mammogram.  Return for annual exams  Mammograms every 1 year. If 37 yo and last mammogram was negative. Routine health maintenance per patients PCP. Orders Placed This Encounter   Procedures    SAL JONAH DIGITAL SCREEN BILATERAL     Standing Status:   Future     Standing Expiration Date:   9/4/2022    PAP SMEAR     Standing Status:   Future     Standing Expiration Date:   5/4/2021     Order Specific Question:   Collection Type     Answer: Thin Prep     Order Specific Question:   Prior Abnormal Pap Test     Answer:   No     Order Specific Question:   Screening or Diagnostic     Answer:   Screening     Order Specific Question:   HPV Requested?      Answer:   Yes - If Abnormal Reflex HPV     Order Specific Question:   High Risk Patient     Answer:   N/A       Sam Mayorga  3/4/2021

## 2021-03-11 DIAGNOSIS — E11.9 TYPE 2 DIABETES MELLITUS WITHOUT COMPLICATION, UNSPECIFIED WHETHER LONG TERM INSULIN USE (HCC): Primary | ICD-10-CM

## 2021-03-11 DIAGNOSIS — I10 ESSENTIAL HYPERTENSION: ICD-10-CM

## 2021-03-11 DIAGNOSIS — E78.2 MIXED HYPERLIPIDEMIA: ICD-10-CM

## 2021-03-12 ENCOUNTER — HOSPITAL ENCOUNTER (OUTPATIENT)
Dept: CT IMAGING | Age: 61
Discharge: HOME | End: 2021-03-14
Payer: COMMERCIAL

## 2021-03-12 DIAGNOSIS — R51.9 RIGHT FACIAL PAIN: ICD-10-CM

## 2021-03-12 DIAGNOSIS — M26.609 TMJ (TEMPOROMANDIBULAR JOINT DISORDER): ICD-10-CM

## 2021-03-12 LAB — CYTOLOGY REPORT: NORMAL

## 2021-03-12 PROCEDURE — 70486 CT MAXILLOFACIAL W/O DYE: CPT

## 2021-03-18 ENCOUNTER — HOSPITAL ENCOUNTER (OUTPATIENT)
Dept: LAB | Age: 61
Discharge: HOME | End: 2021-03-18
Payer: COMMERCIAL

## 2021-03-18 ENCOUNTER — OFFICE VISIT (OUTPATIENT)
Dept: FAMILY MEDICINE CLINIC | Age: 61
End: 2021-03-18
Payer: COMMERCIAL

## 2021-03-18 VITALS
SYSTOLIC BLOOD PRESSURE: 118 MMHG | DIASTOLIC BLOOD PRESSURE: 62 MMHG | BODY MASS INDEX: 27.97 KG/M2 | HEART RATE: 96 BPM | OXYGEN SATURATION: 98 % | WEIGHT: 174 LBS | HEIGHT: 66 IN

## 2021-03-18 DIAGNOSIS — I10 ESSENTIAL HYPERTENSION: ICD-10-CM

## 2021-03-18 DIAGNOSIS — E78.2 MIXED HYPERLIPIDEMIA: ICD-10-CM

## 2021-03-18 DIAGNOSIS — M26.609 TMJ (TEMPOROMANDIBULAR JOINT SYNDROME): Primary | ICD-10-CM

## 2021-03-18 DIAGNOSIS — K21.9 GASTROESOPHAGEAL REFLUX DISEASE WITHOUT ESOPHAGITIS: ICD-10-CM

## 2021-03-18 DIAGNOSIS — E11.9 TYPE 2 DIABETES MELLITUS WITHOUT COMPLICATION, UNSPECIFIED WHETHER LONG TERM INSULIN USE (HCC): ICD-10-CM

## 2021-03-18 LAB
ALBUMIN SERPL-MCNC: 4.9 G/DL (ref 3.5–5.2)
ALBUMIN/GLOBULIN RATIO: 1.5 (ref 1–2.5)
ALP BLD-CCNC: 137 U/L (ref 35–104)
ALT SERPL-CCNC: 22 U/L (ref 5–33)
ANION GAP SERPL CALCULATED.3IONS-SCNC: 14 MMOL/L (ref 9–17)
AST SERPL-CCNC: 24 U/L
BILIRUB SERPL-MCNC: 0.31 MG/DL (ref 0.3–1.2)
BUN BLDV-MCNC: 25 MG/DL (ref 6–20)
BUN/CREAT BLD: 16 (ref 9–20)
CALCIUM SERPL-MCNC: 10.5 MG/DL (ref 8.6–10.4)
CHLORIDE BLD-SCNC: 105 MMOL/L (ref 98–107)
CHOLESTEROL/HDL RATIO: 3.5
CHOLESTEROL: 264 MG/DL
CO2: 26 MMOL/L (ref 20–31)
CREAT SERPL-MCNC: 1.6 MG/DL (ref 0.5–0.9)
CREATININE URINE: 173.9 MG/DL (ref 28–217)
ESTIMATED AVERAGE GLUCOSE: 134 MG/DL
GFR AFRICAN AMERICAN: 40 ML/MIN
GFR NON-AFRICAN AMERICAN: 33 ML/MIN
GFR SERPL CREATININE-BSD FRML MDRD: ABNORMAL ML/MIN/{1.73_M2}
GFR SERPL CREATININE-BSD FRML MDRD: ABNORMAL ML/MIN/{1.73_M2}
GLUCOSE BLD-MCNC: 127 MG/DL (ref 70–99)
HBA1C MFR BLD: 6.3 % (ref 4–6)
HDLC SERPL-MCNC: 75 MG/DL
LAMOTRIGINE LEVEL: 13.7 UG/ML (ref 3–15)
LDL CHOLESTEROL: 139 MG/DL (ref 0–130)
MICROALBUMIN/CREAT 24H UR: 15 MG/L
MICROALBUMIN/CREAT UR-RTO: 9 MCG/MG CREAT
POTASSIUM SERPL-SCNC: 4.5 MMOL/L (ref 3.7–5.3)
SODIUM BLD-SCNC: 145 MMOL/L (ref 135–144)
TOTAL PROTEIN: 8.2 G/DL (ref 6.4–8.3)
TRIGL SERPL-MCNC: 248 MG/DL
VLDLC SERPL CALC-MCNC: ABNORMAL MG/DL (ref 1–30)

## 2021-03-18 PROCEDURE — 82043 UR ALBUMIN QUANTITATIVE: CPT

## 2021-03-18 PROCEDURE — 80175 DRUG SCREEN QUAN LAMOTRIGINE: CPT

## 2021-03-18 PROCEDURE — 99214 OFFICE O/P EST MOD 30 MIN: CPT | Performed by: NURSE PRACTITIONER

## 2021-03-18 PROCEDURE — 80061 LIPID PANEL: CPT

## 2021-03-18 PROCEDURE — 80053 COMPREHEN METABOLIC PANEL: CPT

## 2021-03-18 PROCEDURE — 82570 ASSAY OF URINE CREATININE: CPT

## 2021-03-18 PROCEDURE — 83036 HEMOGLOBIN GLYCOSYLATED A1C: CPT

## 2021-03-18 PROCEDURE — 36415 COLL VENOUS BLD VENIPUNCTURE: CPT

## 2021-03-18 RX ORDER — ATORVASTATIN CALCIUM 10 MG/1
TABLET, FILM COATED ORAL
Qty: 90 TABLET | Refills: 1 | Status: SHIPPED | OUTPATIENT
Start: 2021-03-18 | End: 2021-03-19 | Stop reason: DRUGHIGH

## 2021-03-18 RX ORDER — OMEPRAZOLE 20 MG/1
20 CAPSULE, DELAYED RELEASE ORAL DAILY
Qty: 90 CAPSULE | Refills: 1 | Status: SHIPPED | OUTPATIENT
Start: 2021-03-18 | End: 2021-03-19 | Stop reason: SDUPTHER

## 2021-03-18 RX ORDER — HYDROCHLOROTHIAZIDE 12.5 MG/1
12.5 CAPSULE, GELATIN COATED ORAL EVERY MORNING
Qty: 90 CAPSULE | Refills: 1 | Status: SHIPPED | OUTPATIENT
Start: 2021-03-18 | End: 2021-03-19 | Stop reason: SDUPTHER

## 2021-03-18 ASSESSMENT — ENCOUNTER SYMPTOMS
EYE PAIN: 0
CHEST TIGHTNESS: 0
EYE ITCHING: 0
COUGH: 0
SHORTNESS OF BREATH: 0
DIARRHEA: 0
COLOR CHANGE: 0
CONSTIPATION: 0
ABDOMINAL PAIN: 0
RHINORRHEA: 1

## 2021-03-18 ASSESSMENT — PATIENT HEALTH QUESTIONNAIRE - PHQ9
SUM OF ALL RESPONSES TO PHQ QUESTIONS 1-9: 2
SUM OF ALL RESPONSES TO PHQ QUESTIONS 1-9: 2
1. LITTLE INTEREST OR PLEASURE IN DOING THINGS: 1

## 2021-03-18 NOTE — PROGRESS NOTES
Francisca Dubin (:  1960) is a 63 y.o. female,Established patient, here for evaluation of the following chief complaint(s):  Diabetes (had lab work done this am) and Hypertension      ASSESSMENT/PLAN:  1. TMJ (temporomandibular joint syndrome)  2. Essential hypertension  -     hydroCHLOROthiazide (MICROZIDE) 12.5 MG capsule; Take 1 capsule by mouth every morning, Disp-90 capsule, R-1Normal  3. Gastroesophageal reflux disease without esophagitis  -     omeprazole (PRILOSEC) 20 MG delayed release capsule; Take 1 capsule by mouth daily, Disp-90 capsule, R-1Normal  4. Mixed hyperlipidemia  -     atorvastatin (LIPITOR) 10 MG tablet; TAKE 1 TABLET DAILY, Disp-90 tablet, R-1Normal  5. Type 2 diabetes mellitus without complication, unspecified whether long term insulin use ([facility]    Continue current medications at this time  Will call with lab results  Encouraged patient to decrease alcohol consumption  Pt to return in 6 months for follow up   Pt to return PRN   Return in about 6 months (around 2021), or if symptoms worsen or fail to improve. SUBJECTIVE/OBJECTIVE:  Patient presents for 6 month F/U. Patient recently seen ENT and states she was diagnosed with TMJ. She states since she has had less pain in that area, and states her hearing is less muffled. Palpation over masseter causes some tenderness on right side. No popping or clicking noted on exam.     Noted that the weight loss occurred when she was on the generic medication. She continues to see psych. Hypertension  This is a chronic problem. The current episode started more than 1 year ago. The problem is unchanged. The problem is controlled. Pertinent negatives include no anxiety, chest pain, headaches, malaise/fatigue, palpitations, peripheral edema or shortness of breath. There are no associated agents to hypertension. Risk factors for coronary artery disease include diabetes mellitus, dyslipidemia, post-menopausal state and smoking/tobacco exposure. Past treatments include ACE inhibitors and diuretics. The current treatment provides significant improvement. Compliance problems include diet and exercise. Gastroesophageal Reflux  She reports no abdominal pain, no chest pain, no coughing, no heartburn or no sore throat. This is a chronic problem. The current episode started more than 1 year ago. The problem occurs occasionally. The problem has been unchanged. Nothing aggravates the symptoms. Pertinent negatives include no fatigue or muscle weakness. Risk factors include smoking/tobacco exposure and ETOH use. She has tried a PPI for the symptoms. The treatment provided moderate relief. Hyperlipidemia  This is a chronic problem. The current episode started more than 1 year ago. Condition status: labs are not available at the time of visit. Exacerbating diseases include diabetes. Factors aggravating her hyperlipidemia include fatty foods, smoking and thiazides. Pertinent negatives include no chest pain, leg pain, myalgias or shortness of breath. Current antihyperlipidemic treatment includes statins. Compliance problems include adherence to diet and adherence to exercise. Risk factors for coronary artery disease include diabetes mellitus, dyslipidemia, hypertension and post-menopausal.   Diabetes  She presents for her follow-up diabetic visit. She has type 2 diabetes mellitus. Pertinent negatives for hypoglycemia include no dizziness, headaches, nervousness/anxiousness or seizures. Pertinent negatives for diabetes include no chest pain, no fatigue, no polydipsia, no polyuria and no visual change. There are no hypoglycemic complications. Diabetic complications include peripheral neuropathy. Risk factors for coronary artery disease include hypertension, diabetes mellitus, dyslipidemia, post-menopausal and tobacco exposure. Current diabetic treatments: non insulin injection. She is compliant with treatment most of the time. Her weight is decreasing steadily.  She is following a generally unhealthy diet. Meal planning includes avoidance of concentrated sweets. She rarely participates in exercise. Home blood sugar record trend: does not check blood sugar. An ACE inhibitor/angiotensin II receptor blocker is being taken. She does not see a podiatrist.Eye exam is not current. Past Medical History:   Diagnosis Date    Cervicogenic headache     Chronic sinusitis     Conductive hearing loss     Degenerative arthritis of cervical spine     Depression     Elevated fasting glucose     Epigastric abdominal pain     GERD (gastroesophageal reflux disease)     History of cervical dysplasia 1987    LASER    History of methamphetamine abuse ([address]     (Quit 6-2001)    LSIL (low grade squamous intraepithelial lesion) on Pap smear 2002    Cryo    Tobacco abuse        Past Surgical History:   Procedure Laterality Date    CATARACT REMOVAL Bilateral     CATARACT REMOVAL WITH IMPLANT Left 3/22/2016    Dr. Danielle Grande, [address] COLONOSCOPY  03/03/2017    Dr. Zach Sawyer, [facility]  03/10/1987    Laser conization of cervix (Hx of moderate dysplasia on Pap test).  UPPER GASTROINTESTINAL ENDOSCOPY  05/02/2013    Mild gastritis.        Social History     Socioeconomic History    Marital status:      Spouse name: Not on file    Number of children: Not on file    Years of education: Not on file    Highest education level: Not on file   Occupational History    Not on file   Social Needs    Financial resource strain: Not on file    Food insecurity     Worry: Not on file     Inability: Not on file    Transportation needs     Medical: Not on file     Non-medical: Not on file   Tobacco Use    Smoking status: Current Every Day Smoker     Packs/day: 0.25     Years: 40.00     Pack years: 10.00     Start date: 3/18/1975    Smokeless tobacco: Never Used   Substance and Sexual Activity    Alcohol use: Yes     Alcohol/week: 0.0 standard drinks     Comment: socially    Drug use: Yes     Types: Opiates , Marijuana     Comment: Quit . Followed up at the recovery center.     Sexual activity: Yes     Partners: Male     Birth control/protection: Post-menopausal     Comment: Menarche age 17  Menopause age 49   Lifestyle    Physical activity     Days per week: Not on file     Minutes per session: Not on file    Stress: Not on file   Relationships    Social connections     Talks on phone: Not on file     Gets together: Not on file     Attends Restoration service: Not on file     Active member of club or organization: Not on file     Attends meetings of clubs or organizations: Not on file     Relationship status: Not on file    Intimate partner violence     Fear of current or ex partner: Not on file     Emotionally abused: Not on file     Physically abused: Not on file     Forced sexual activity: Not on file   Other Topics Concern    Not on file   Social History Narrative    Not on file       Family History   Problem Relation Age of Onset    Diabetes Mother           general health deterioration after falling    Osteoporosis Mother     High Cholesterol Father     Lung Cancer Father          age 61    Diabetes Other     Diabetes Other     Diabetes Brother        Allergies   Allergen Reactions    Gabapentin Anaphylaxis    Lyrica [Pregabalin]     Wellbutrin [Bupropion] Other (See Comments)     Seeing double    Ciprofloxacin Other (See Comments)     Stomach pain    Keflex [Cephalexin] Other (See Comments)     Stomach pain       Current Outpatient Medications   Medication Sig Dispense Refill    FLUoxetine (PROZAC) 20 MG capsule Take 60 mg by mouth daily      lamoTRIgine (LAMICTAL) 200 MG tablet Take 200 mg by mouth daily      SAPHRIS 10 MG SUBL sublingual tablet nightly       atorvastatin (LIPITOR) 20 MG tablet Take 1 tablet by mouth daily 30 tablet 3    hydroCHLOROthiazide (MICROZIDE) 12.5 MG capsule Take 1 capsule by mouth every morning 90 capsule 1    omeprazole (PRILOSEC) 20 MG delayed release capsule Take 1 capsule by mouth daily 90 capsule 1    lisinopril (PRINIVIL;ZESTRIL) 40 MG tablet TAKE 1 TABLET DAILY 90 tablet 1    Exenatide (BYDUREON) 2 MG PEN Inject 1 pen into the skin once a week 5 pen 3    Blood Pressure KIT Check blood pressure daily and as needed E11.9 (hypertension) 1 kit 0    Blood Pressure Monitor CARY Monitor BP daily; Dx. Hypertension (I10) 1 Device 0    Insulin Pen Needle ([employer] PEN NEEDLES) 31G X 6 MM MISC 1 each by Does not apply route daily 100 each 3    Glucose Blood (BLOOD GLUCOSE TEST STRIPS) STRP Diagnosis 250.90, tests once daily 50 strip 11    Lancets MISC Diagnosis 250.90, tests once daily 50 each 11    Blood Glucose Monitoring Suppl CARY Diagnosis 250.90, tests once daily 1 Device 0    Cholecalciferol (VITAMIN D PO) Take by mouth      Multiple Vitamins-Minerals (MULTIVITAMIN PO) Take  by mouth daily. No current facility-administered medications for this visit. Review of Systems   Constitutional: Positive for unexpected weight change. Negative for activity change, fatigue, fever and malaise/fatigue. 10lb weight loss . Started when she was ill   HENT: Positive for dental problem, postnasal drip and rhinorrhea. Negative for congestion, ear discharge and sore throat. Right ear fullness, tenderness palpated over right masseter    Eyes: Negative for pain and itching. Respiratory: Negative for cough, chest tightness and shortness of breath. Cardiovascular: Negative for chest pain and palpitations. Gastrointestinal: Negative for abdominal pain, constipation, diarrhea and heartburn. Endocrine: Negative for polydipsia and polyuria. Genitourinary: Negative for difficulty urinating, dysuria and flank pain. Musculoskeletal: Negative for myalgias and muscle weakness. Skin: Negative for color change. Neurological: Negative for dizziness, seizures, numbness and headaches. Psychiatric/Behavioral: The patient is not nervous/anxious. Physical Exam  Constitutional:       Appearance: Normal appearance. HENT:      Head: Normocephalic and atraumatic. Right Ear: Tympanic membrane normal.      Left Ear: Tympanic membrane normal.      Nose: Nose normal. No congestion. Mouth/Throat:      Mouth: Mucous membranes are moist.      Pharynx: Oropharynx is clear. Eyes:      Pupils: Pupils are equal, round, and reactive to light. Neck:      Musculoskeletal: Normal range of motion. No neck rigidity. Cardiovascular:      Rate and Rhythm: Normal rate and regular rhythm. Pulses: Normal pulses. Heart sounds: Normal heart sounds. No murmur. No gallop. Pulmonary:      Effort: Pulmonary effort is normal. No respiratory distress. Breath sounds: Normal breath sounds. No wheezing. Abdominal:      General: Bowel sounds are normal. There is no distension. Tenderness: There is no abdominal tenderness. Musculoskeletal:         General: No swelling. Skin:     General: Skin is warm. Capillary Refill: Capillary refill takes less than 2 seconds. Neurological:      General: No focal deficit present. Mental Status: She is alert and oriented to person, place, and time. On this date 3/18/2021 I have spent 30 minutes reviewing previous notes, test results and face to face with the patient discussing the diagnosis and importance of compliance with the treatment plan as well as documenting on the day of the visit. An electronic signature was used to authenticate this note.     --Radene Cough

## 2021-03-19 DIAGNOSIS — I10 ESSENTIAL HYPERTENSION: ICD-10-CM

## 2021-03-19 DIAGNOSIS — K21.9 GASTROESOPHAGEAL REFLUX DISEASE WITHOUT ESOPHAGITIS: ICD-10-CM

## 2021-03-19 DIAGNOSIS — E78.2 MIXED HYPERLIPIDEMIA: ICD-10-CM

## 2021-03-19 DIAGNOSIS — E83.52 SERUM CALCIUM ELEVATED: ICD-10-CM

## 2021-03-19 DIAGNOSIS — N28.9 KIDNEY FUNCTION ABNORMAL: Primary | ICD-10-CM

## 2021-03-19 RX ORDER — OMEPRAZOLE 20 MG/1
20 CAPSULE, DELAYED RELEASE ORAL DAILY
Qty: 90 CAPSULE | Refills: 1 | Status: SHIPPED | OUTPATIENT
Start: 2021-03-19 | End: 2021-10-22 | Stop reason: SDUPTHER

## 2021-03-19 RX ORDER — HYDROCHLOROTHIAZIDE 12.5 MG/1
12.5 CAPSULE, GELATIN COATED ORAL EVERY MORNING
Qty: 90 CAPSULE | Refills: 1 | Status: SHIPPED | OUTPATIENT
Start: 2021-03-19 | End: 2021-10-04

## 2021-03-19 RX ORDER — LISINOPRIL 40 MG/1
TABLET ORAL
Qty: 90 TABLET | Refills: 1 | Status: SHIPPED | OUTPATIENT
Start: 2021-03-19 | End: 2021-11-08

## 2021-03-19 RX ORDER — ATORVASTATIN CALCIUM 20 MG/1
20 TABLET, FILM COATED ORAL DAILY
Qty: 30 TABLET | Refills: 3 | Status: SHIPPED | OUTPATIENT
Start: 2021-03-19 | End: 2021-10-22 | Stop reason: SDUPTHER

## 2021-03-19 NOTE — TELEPHONE ENCOUNTER
Spoke with patient and advised patient of lab results and recommendations. Patient agreed with plan.  Patient would like medications sent to [facility] instead of [facility] pended

## 2021-03-19 NOTE — TELEPHONE ENCOUNTER
----- Message from SAEED Russo CNP sent at 3/19/2021  2:22 PM EDT -----  microalbumin is WNL. Her A1C is still controlled but it is higher than it has been. Encouraged healthy diet and increase exercise. Her kidney function is higher than it has been. Have her push fluids and repeat a BMP in 2 weeks. Her calcium is also mildly elevated I would like her to do a PTH, SPEP, and UPEP when she comes in for the repeat BMP. Her lipids are very uncontrolled. I would like to increase her lipitor to 20mg daily. Repeat lipid panel and AST in 3 months.

## 2021-03-20 ASSESSMENT — ENCOUNTER SYMPTOMS
SORE THROAT: 0
HEARTBURN: 0
VISUAL CHANGE: 0

## 2021-03-25 ENCOUNTER — IMMUNIZATION (OUTPATIENT)
Dept: LAB | Age: 61
End: 2021-03-25
Payer: COMMERCIAL

## 2021-03-25 PROCEDURE — 91301 COVID-19, MODERNA VACCINE 100MCG/0.5ML DOSE: CPT | Performed by: INTERNAL MEDICINE

## 2021-03-25 PROCEDURE — 0011A COVID-19, MODERNA VACCINE 100MCG/0.5ML DOSE: CPT | Performed by: INTERNAL MEDICINE

## 2021-04-02 ENCOUNTER — HOSPITAL ENCOUNTER (OUTPATIENT)
Dept: LAB | Age: 61
Discharge: HOME | End: 2021-04-02
Payer: COMMERCIAL

## 2021-04-02 ENCOUNTER — TELEPHONE (OUTPATIENT)
Dept: FAMILY MEDICINE CLINIC | Age: 61
End: 2021-04-02

## 2021-04-02 DIAGNOSIS — R79.89 ELEVATED SERUM CREATININE: Primary | ICD-10-CM

## 2021-04-02 DIAGNOSIS — E83.52 SERUM CALCIUM ELEVATED: ICD-10-CM

## 2021-04-02 DIAGNOSIS — N28.9 KIDNEY FUNCTION ABNORMAL: ICD-10-CM

## 2021-04-02 LAB
ANION GAP SERPL CALCULATED.3IONS-SCNC: 16 MMOL/L (ref 9–17)
BUN BLDV-MCNC: 41 MG/DL (ref 6–20)
BUN/CREAT BLD: 13 (ref 9–20)
CALCIUM IONIZED: 1.36 MMOL/L (ref 1.13–1.33)
CALCIUM SERPL-MCNC: 10.6 MG/DL (ref 8.6–10.4)
CHLORIDE BLD-SCNC: 102 MMOL/L (ref 98–107)
CO2: 23 MMOL/L (ref 20–31)
CREAT SERPL-MCNC: 3.09 MG/DL (ref 0.5–0.9)
GFR AFRICAN AMERICAN: 19 ML/MIN
GFR NON-AFRICAN AMERICAN: 15 ML/MIN
GFR SERPL CREATININE-BSD FRML MDRD: ABNORMAL ML/MIN/{1.73_M2}
GFR SERPL CREATININE-BSD FRML MDRD: ABNORMAL ML/MIN/{1.73_M2}
GLUCOSE BLD-MCNC: 151 MG/DL (ref 70–99)
POTASSIUM SERPL-SCNC: 4.9 MMOL/L (ref 3.7–5.3)
PTH INTACT: 29.64 PG/ML (ref 15–65)
SODIUM BLD-SCNC: 141 MMOL/L (ref 135–144)

## 2021-04-02 PROCEDURE — 84166 PROTEIN E-PHORESIS/URINE/CSF: CPT

## 2021-04-02 PROCEDURE — 82330 ASSAY OF CALCIUM: CPT

## 2021-04-02 PROCEDURE — 83970 ASSAY OF PARATHORMONE: CPT

## 2021-04-02 PROCEDURE — 84165 PROTEIN E-PHORESIS SERUM: CPT

## 2021-04-02 PROCEDURE — 84155 ASSAY OF PROTEIN SERUM: CPT

## 2021-04-02 PROCEDURE — 80048 BASIC METABOLIC PNL TOTAL CA: CPT

## 2021-04-02 PROCEDURE — 36415 COLL VENOUS BLD VENIPUNCTURE: CPT

## 2021-04-02 PROCEDURE — 84156 ASSAY OF PROTEIN URINE: CPT

## 2021-04-02 NOTE — TELEPHONE ENCOUNTER
Spoke with patient and advised patient of lab results. Patient states that she feels fine and is not having any symptoms of anything at this time. Patient states she has not taken any new vitamins or medications. Patient states that she will await S recommendations.

## 2021-04-05 ENCOUNTER — TELEPHONE (OUTPATIENT)
Dept: FAMILY MEDICINE CLINIC | Age: 61
End: 2021-04-05

## 2021-04-05 LAB
ALBUMIN (CALCULATED): 5 G/DL (ref 3.2–5.2)
ALBUMIN PERCENT: 68 % (ref 45–65)
ALPHA 1 PERCENT: 2 % (ref 3–6)
ALPHA 2 PERCENT: 11 % (ref 6–13)
ALPHA-1-GLOBULIN: 0.1 G/DL (ref 0.1–0.4)
ALPHA-2-GLOBULIN: 0.8 G/DL (ref 0.5–0.9)
BETA GLOBULIN: 0.8 G/DL (ref 0.5–1.1)
BETA PERCENT: 11 % (ref 11–19)
GAMMA GLOBULIN %: 9 % (ref 9–20)
GAMMA GLOBULIN: 0.6 G/DL (ref 0.5–1.5)
P E INTERPRETATION, U: NORMAL
PATHOLOGIST: ABNORMAL
PATHOLOGIST: NORMAL
PROTEIN ELECTROPHORESIS, SERUM: ABNORMAL
SPECIMEN TYPE: NORMAL
TOTAL PROT. SUM,%: 101 % (ref 98–102)
TOTAL PROT. SUM: 7.3 G/DL (ref 6.3–8.2)
TOTAL PROTEIN: 7.3 G/DL (ref 6.4–8.3)
URINE TOTAL PROTEIN: 44 MG/DL

## 2021-04-05 NOTE — TELEPHONE ENCOUNTER
Called and checked on patient to see how she is doing. Patient states that she is feeling fine, urinating real good per patient and is not having any problems. Patient states that she forgot to get her lab work done today but will do it tomorrow, Tuesday 04/06/2021.

## 2021-04-06 ENCOUNTER — HOSPITAL ENCOUNTER (OUTPATIENT)
Dept: LAB | Age: 61
Discharge: HOME | End: 2021-04-06
Payer: COMMERCIAL

## 2021-04-06 DIAGNOSIS — R79.89 ELEVATED SERUM CREATININE: ICD-10-CM

## 2021-04-06 LAB
ANION GAP SERPL CALCULATED.3IONS-SCNC: 13 MMOL/L (ref 9–17)
BUN BLDV-MCNC: 39 MG/DL (ref 6–20)
BUN/CREAT BLD: 17 (ref 9–20)
CALCIUM SERPL-MCNC: 10.8 MG/DL (ref 8.6–10.4)
CHLORIDE BLD-SCNC: 100 MMOL/L (ref 98–107)
CO2: 23 MMOL/L (ref 20–31)
CREAT SERPL-MCNC: 2.34 MG/DL (ref 0.5–0.9)
GFR AFRICAN AMERICAN: 26 ML/MIN
GFR NON-AFRICAN AMERICAN: 21 ML/MIN
GFR SERPL CREATININE-BSD FRML MDRD: ABNORMAL ML/MIN/{1.73_M2}
GFR SERPL CREATININE-BSD FRML MDRD: ABNORMAL ML/MIN/{1.73_M2}
GLUCOSE BLD-MCNC: 144 MG/DL (ref 70–99)
POTASSIUM SERPL-SCNC: 5.2 MMOL/L (ref 3.7–5.3)
SODIUM BLD-SCNC: 136 MMOL/L (ref 135–144)

## 2021-04-06 PROCEDURE — 36415 COLL VENOUS BLD VENIPUNCTURE: CPT

## 2021-04-06 PROCEDURE — 80048 BASIC METABOLIC PNL TOTAL CA: CPT

## 2021-04-07 ENCOUNTER — TELEPHONE (OUTPATIENT)
Dept: FAMILY MEDICINE CLINIC | Age: 61
End: 2021-04-07

## 2021-04-07 DIAGNOSIS — N28.9 KIDNEY FUNCTION ABNORMAL: ICD-10-CM

## 2021-04-07 DIAGNOSIS — E83.52 SERUM CALCIUM ELEVATED: Primary | ICD-10-CM

## 2021-04-07 NOTE — TELEPHONE ENCOUNTER
Creatinine is trending down. I would like to repeat a BMP in 1 week. She needs a referral to nephrology.

## 2021-04-07 NOTE — TELEPHONE ENCOUNTER
Patient returned call and results were given to patient. Patient is aware of results and recommendations. Patient is agreeable to appointments.  Will place referrals

## 2021-04-07 NOTE — TELEPHONE ENCOUNTER
----- Message from SAEED Winters - CNP sent at 4/6/2021  3:50 PM EDT -----  Parts of SPEP are positive and her ionized calcium is elevated.  I would like patient to see endocrinology

## 2021-04-15 ENCOUNTER — HOSPITAL ENCOUNTER (OUTPATIENT)
Dept: LAB | Age: 61
Discharge: HOME | End: 2021-04-15
Payer: COMMERCIAL

## 2021-04-15 DIAGNOSIS — N28.9 KIDNEY FUNCTION ABNORMAL: ICD-10-CM

## 2021-04-15 LAB
ANION GAP SERPL CALCULATED.3IONS-SCNC: 13 MMOL/L (ref 9–17)
BUN BLDV-MCNC: 39 MG/DL (ref 6–20)
BUN/CREAT BLD: 17 (ref 9–20)
CALCIUM SERPL-MCNC: 10.1 MG/DL (ref 8.6–10.4)
CHLORIDE BLD-SCNC: 102 MMOL/L (ref 98–107)
CO2: 26 MMOL/L (ref 20–31)
CREAT SERPL-MCNC: 2.36 MG/DL (ref 0.5–0.9)
GFR AFRICAN AMERICAN: 26 ML/MIN
GFR NON-AFRICAN AMERICAN: 21 ML/MIN
GFR SERPL CREATININE-BSD FRML MDRD: ABNORMAL ML/MIN/{1.73_M2}
GFR SERPL CREATININE-BSD FRML MDRD: ABNORMAL ML/MIN/{1.73_M2}
GLUCOSE BLD-MCNC: 160 MG/DL (ref 70–99)
POTASSIUM SERPL-SCNC: 4.9 MMOL/L (ref 3.7–5.3)
SODIUM BLD-SCNC: 141 MMOL/L (ref 135–144)

## 2021-04-15 PROCEDURE — 36415 COLL VENOUS BLD VENIPUNCTURE: CPT

## 2021-04-15 PROCEDURE — 80048 BASIC METABOLIC PNL TOTAL CA: CPT

## 2021-04-19 ENCOUNTER — HOSPITAL ENCOUNTER (OUTPATIENT)
Dept: MRI IMAGING | Age: 61
Discharge: HOME | End: 2021-04-21
Payer: COMMERCIAL

## 2021-04-19 ENCOUNTER — TELEPHONE (OUTPATIENT)
Dept: FAMILY MEDICINE CLINIC | Age: 61
End: 2021-04-19

## 2021-04-19 DIAGNOSIS — I67.1 CEREBRAL ANEURYSM, NONRUPTURED: ICD-10-CM

## 2021-04-19 DIAGNOSIS — N28.9 KIDNEY FUNCTION ABNORMAL: Primary | ICD-10-CM

## 2021-04-19 DIAGNOSIS — R79.89 ELEVATED SERUM CREATININE: ICD-10-CM

## 2021-04-19 PROCEDURE — 70544 MR ANGIOGRAPHY HEAD W/O DYE: CPT

## 2021-04-19 NOTE — TELEPHONE ENCOUNTER
----- Message from SAEED Hansen CNP sent at 4/16/2021  3:48 PM EDT -----  Creatinine is still elevated but stable. Recheck BMP in 3 weeks.

## 2021-04-19 NOTE — TELEPHONE ENCOUNTER
Spoke with patient and advised patient of lab results and recommendations. Patient agrees with plan and recommendations.

## 2021-04-22 ENCOUNTER — IMMUNIZATION (OUTPATIENT)
Dept: LAB | Age: 61
End: 2021-04-22
Payer: COMMERCIAL

## 2021-04-22 PROCEDURE — 0012A COVID-19, MODERNA VACCINE 100MCG/0.5ML DOSE: CPT | Performed by: INTERNAL MEDICINE

## 2021-04-22 PROCEDURE — 91301 COVID-19, MODERNA VACCINE 100MCG/0.5ML DOSE: CPT | Performed by: INTERNAL MEDICINE

## 2021-06-07 ENCOUNTER — OFFICE VISIT (OUTPATIENT)
Dept: NEPHROLOGY | Age: 61
End: 2021-06-07
Payer: COMMERCIAL

## 2021-06-07 VITALS
SYSTOLIC BLOOD PRESSURE: 112 MMHG | BODY MASS INDEX: 25.75 KG/M2 | DIASTOLIC BLOOD PRESSURE: 76 MMHG | HEART RATE: 89 BPM | WEIGHT: 160.2 LBS | HEIGHT: 66 IN

## 2021-06-07 DIAGNOSIS — N18.32 STAGE 3B CHRONIC KIDNEY DISEASE (HCC): Primary | ICD-10-CM

## 2021-06-07 DIAGNOSIS — I10 HYPERTENSION, ESSENTIAL: ICD-10-CM

## 2021-06-07 DIAGNOSIS — E11.22 TYPE 2 DIABETES MELLITUS WITH STAGE 3B CHRONIC KIDNEY DISEASE, WITHOUT LONG-TERM CURRENT USE OF INSULIN (HCC): ICD-10-CM

## 2021-06-07 DIAGNOSIS — N18.32 TYPE 2 DIABETES MELLITUS WITH STAGE 3B CHRONIC KIDNEY DISEASE, WITHOUT LONG-TERM CURRENT USE OF INSULIN (HCC): ICD-10-CM

## 2021-06-07 DIAGNOSIS — Z72.0 TOBACCO ABUSE: ICD-10-CM

## 2021-06-07 DIAGNOSIS — N17.9 AKI (ACUTE KIDNEY INJURY) (HCC): ICD-10-CM

## 2021-06-07 PROCEDURE — 99213 OFFICE O/P EST LOW 20 MIN: CPT | Performed by: INTERNAL MEDICINE

## 2021-06-07 NOTE — PROGRESS NOTES
pains.      Objective:  /76 (Site: Left Upper Arm, Position: Sitting, Cuff Size: Medium Adult)   Pulse 89   Ht 5' 6\" (1.676 m)   Wt 160 lb 3.2 oz (72.7 kg)   LMP 08/28/2016   BMI 25.86 kg/m²     Physical Exam:  General appearance: awake, alert, in no acute distress  Skin: warm and dry, no rash or erythema  Eyes: conjunctivae normal and sclera anicteric  ENT: no thrush no pharyngeal congestion    Neck: supple.    Pulmonary: lungs are clear and without any wheezing or rhonchi   Cardiovascular: normal S1 & S2, No Murmur   Abdomen: soft nontender, bowel sounds present, no organomegaly, no ascites    Extremities: no cyanosis, clubbing or edema    Labs:    CBC:   Lab Results   Component Value Date    WBC 7.6 05/19/2020    RBC 4.11 05/19/2020    HGB 13.2 05/19/2020    HCT 40.0 05/19/2020    MCV 97.3 05/19/2020    RDW 12.4 05/19/2020     05/19/2020    MPV 10.2 05/19/2020      BMP:   Lab Results   Component Value Date     04/15/2021     04/06/2021     04/02/2021    K 4.9 04/15/2021    K 5.2 04/06/2021    K 4.9 04/02/2021     04/15/2021     04/06/2021     04/02/2021    CO2 26 04/15/2021    CO2 23 04/06/2021    CO2 23 04/02/2021    BUN 39 04/15/2021    BUN 39 04/06/2021    BUN 41 04/02/2021    CREATININE 2.36 04/15/2021    CREATININE 2.34 04/06/2021    CREATININE 3.09 04/02/2021    GLUCOSE 160 04/15/2021    GLUCOSE 144 04/06/2021    GLUCOSE 151 04/02/2021    CALCIUM 10.1 04/15/2021    CALCIUM 10.8 04/06/2021    CALCIUM 10.6 04/02/2021      BNP:No results found for: BNP  PHOSPHORUS:  No results found for: PHOS  MAGNESIUM: No results found for: MG  ALBUMIN:   Lab Results   Component Value Date    LABALBU 4.9 03/18/2021     IRON:  No results found for: IRON  IRON SATURATION:  No results found for: LABIRON  TIBC:  No results found for: TIBC  FERRITIN:  No results found for: FERRITIN  ASHLEY: No results found for: ASHLEY    SPEP:   Lab Results   Component Value Date    PROT 7.3 04/02/2021    ALBCAL 5.0 04/02/2021    ALBPCT 68 04/02/2021    LABALPH 0.1 04/02/2021    LABALPH 0.8 04/02/2021    A1PCT 2 04/02/2021    A2PCT 11 04/02/2021    LABBETA 0.8 04/02/2021    BETAPCT 11 04/02/2021    GAMGLOB 0.6 04/02/2021    GGPCT 9 04/02/2021    PATH ELECTRONICALLY SIGNED. Thierry Schreiber M.D. 04/02/2021     UPEP:   Lab Results   Component Value Date    TPU 44 04/02/2021      HEPBSAG:  Lab Results   Component Value Date    HEPBSAG NONREACTIVE 05/21/2018     HEPCAB:  Lab Results   Component Value Date    HEPCAB NONREACTIVE 05/21/2018     C3: No results found for: C3  C4: No results found for: C4  MPO ANCA: No results found for: MPO . PR3 ANCA:  No results found for: PR3  PTH: No results found for: PTH    Urine Creatinine:    Lab Results   Component Value Date    LABCREA 173.9 03/18/2021     Urine Eosinophils: No results found for: UREO  Urine Protein:    Lab Results   Component Value Date    TPU 44 04/02/2021     Urinalysis:  U/A:   Lab Results   Component Value Date    NITRU NEGATIVE 05/19/2020    COLORU NOT REPORTED 05/19/2020    PHUR 7.0 05/19/2020    WBCUA 0 TO 4 05/19/2020    RBCUA 0 TO 4 05/19/2020    MUCUS NOT REPORTED 05/19/2020    TRICHOMONAS NOT REPORTED 05/19/2020    YEAST NOT REPORTED 05/19/2020    BACTERIA 1+ 05/19/2020    SPECGRAV 1.020 05/19/2020    LEUKOCYTESUR 1+ 05/19/2020    UROBILINOGEN Normal 05/19/2020    BILIRUBINUR NEGATIVE 05/19/2020    GLUCOSEU NEGATIVE 05/19/2020    [address] NEGATIVE 05/19/2020    AMORPHOUS NOT REPORTED 05/19/2020         Radiology:  Reviewed as available. Assessment:  1. CKD 3 likely due to diabetic and hypertensive nephrosclerosis. Baseline creatinine yet to be established. Patient's creatinine seems to be running around 1.2 to 1.7 mg/DL till March 2021. Then on 4/2/2021 creatinine increased to 3.09 mg/DL. Patient was started on diuretics and could be because of prerenal causes.   Most recent creatinine was 2.36 mg/DL on 4/15/2021.   2. ALBA vs progression of her underlying renal dysfunction. Likely has pre-renal factors along with possible effects of the diet pill and her meds. 3. Hypertension since 8-10 years ago. BP Readings from Last 3 Encounters:   06/07/21 112/76   03/18/21 118/62   03/04/21 118/64      5. Diabetes mellitus since 8-10 years ago. 6. +ve tobacco abuse. Plan:  1. Will Check Renal Ultrasound to r/o element of obstruction and to assess the kidney size/echotexture. 2. Comprehensive urine testing including Urinalysis, Urine  protein and creatinine ratio to   quantify the proteinuria   3. Will Order serum and urine protein electrophoresis to r/o element to occult paraprotein disease. 4. Will order Hepatitis B and C, ASHLEY, Complement levels. 5. Avoid any NSAIDS   6. Counseled about stopping smoking. 7. Advised to stop other meds such as herbal meds and diet pills. 8. Needs to increase water intake, at least 50-60 oz/day. 9. RTC in 2-3 months. Thank you for the consultation. Please do not hesitate to call with questions. Electronically signed by Paulina Johnson MD on 6/7/2021 at 3:04 PM  [facility]     This note is created with the assistance of a speech-recognition program. While intending to generate a document that actually reflects the content of the visit, no guarantees can be provided that every mistake has been identified and corrected by editing.

## 2021-06-15 ENCOUNTER — HOSPITAL ENCOUNTER (OUTPATIENT)
Dept: ULTRASOUND IMAGING | Age: 61
Discharge: HOME | End: 2021-06-17
Payer: COMMERCIAL

## 2021-06-15 DIAGNOSIS — N18.32 STAGE 3B CHRONIC KIDNEY DISEASE (HCC): ICD-10-CM

## 2021-06-15 PROCEDURE — 76770 US EXAM ABDO BACK WALL COMP: CPT

## 2021-06-17 ENCOUNTER — HOSPITAL ENCOUNTER (OUTPATIENT)
Dept: LAB | Age: 61
Discharge: HOME | End: 2021-06-17
Payer: COMMERCIAL

## 2021-06-17 ENCOUNTER — HOSPITAL ENCOUNTER (OUTPATIENT)
Age: 61
Setting detail: SPECIMEN
Discharge: HOME | End: 2021-06-17
Payer: COMMERCIAL

## 2021-06-17 DIAGNOSIS — N18.32 STAGE 3B CHRONIC KIDNEY DISEASE (HCC): ICD-10-CM

## 2021-06-17 DIAGNOSIS — E78.2 MIXED HYPERLIPIDEMIA: ICD-10-CM

## 2021-06-17 DIAGNOSIS — N17.9 AKI (ACUTE KIDNEY INJURY) (HCC): ICD-10-CM

## 2021-06-17 DIAGNOSIS — R79.89 ELEVATED SERUM CREATININE: ICD-10-CM

## 2021-06-17 DIAGNOSIS — N28.9 KIDNEY FUNCTION ABNORMAL: ICD-10-CM

## 2021-06-17 LAB
ABSOLUTE EOS #: 0.2 K/UL (ref 0–0.44)
ABSOLUTE IMMATURE GRANULOCYTE: 0.03 K/UL (ref 0–0.3)
ABSOLUTE LYMPH #: 2.21 K/UL (ref 1.1–3.7)
ABSOLUTE MONO #: 0.66 K/UL (ref 0.1–1.2)
ANION GAP SERPL CALCULATED.3IONS-SCNC: 15 MMOL/L (ref 9–17)
ANION GAP SERPL CALCULATED.3IONS-SCNC: 15 MMOL/L (ref 9–17)
BASOPHILS # BLD: 1 % (ref 0–2)
BASOPHILS ABSOLUTE: 0.04 K/UL (ref 0–0.2)
BUN BLDV-MCNC: 25 MG/DL (ref 6–20)
BUN BLDV-MCNC: 25 MG/DL (ref 6–20)
BUN/CREAT BLD: 14 (ref 9–20)
BUN/CREAT BLD: 14 (ref 9–20)
CALCIUM SERPL-MCNC: 10.2 MG/DL (ref 8.6–10.4)
CHLORIDE BLD-SCNC: 94 MMOL/L (ref 98–107)
CHLORIDE BLD-SCNC: 94 MMOL/L (ref 98–107)
CHLORIDE, UR: 57 MMOL/L
CHOLESTEROL/HDL RATIO: 2.8
CHOLESTEROL: 190 MG/DL
CO2: 25 MMOL/L (ref 20–31)
CO2: 25 MMOL/L (ref 20–31)
COMPLEMENT C3: 133 MG/DL (ref 90–180)
COMPLEMENT C4: 42 MG/DL (ref 10–40)
CREAT SERPL-MCNC: 1.79 MG/DL (ref 0.5–0.9)
CREATININE URINE: 93.8 MG/DL (ref 28–217)
DIFFERENTIAL TYPE: ABNORMAL
EOSINOPHIL,URINE: NEGATIVE
EOSINOPHILS RELATIVE PERCENT: 3 % (ref 1–4)
FREE KAPPA/LAMBDA RATIO: 1.39 (ref 0.26–1.65)
GFR AFRICAN AMERICAN: 35 ML/MIN
GFR NON-AFRICAN AMERICAN: 29 ML/MIN
GFR SERPL CREATININE-BSD FRML MDRD: ABNORMAL ML/MIN/{1.73_M2}
GLUCOSE BLD-MCNC: 144 MG/DL (ref 70–99)
GLUCOSE BLD-MCNC: 144 MG/DL (ref 70–99)
HAV IGM SER IA-ACNC: NONREACTIVE
HCT VFR BLD CALC: 38.7 % (ref 36.3–47.1)
HDLC SERPL-MCNC: 69 MG/DL
HEMOGLOBIN: 12.6 G/DL (ref 11.9–15.1)
HEPATITIS B CORE IGM ANTIBODY: NONREACTIVE
HEPATITIS B SURFACE ANTIGEN: NONREACTIVE
HEPATITIS C ANTIBODY: NONREACTIVE
IMMATURE GRANULOCYTES: 0 %
KAPPA FREE LIGHT CHAINS QNT: 2.04 MG/DL (ref 0.37–1.94)
LAMBDA FREE LIGHT CHAINS QNT: 1.47 MG/DL (ref 0.57–2.63)
LDL CHOLESTEROL: 78 MG/DL (ref 0–130)
LYMPHOCYTES # BLD: 27 % (ref 24–43)
MAGNESIUM: 2 MG/DL (ref 1.6–2.6)
MCH RBC QN AUTO: 33.2 PG (ref 25.2–33.5)
MCHC RBC AUTO-ENTMCNC: 32.6 G/DL (ref 25.2–33.5)
MCV RBC AUTO: 102.1 FL (ref 82.6–102.9)
MONOCYTES # BLD: 8 % (ref 3–12)
NRBC AUTOMATED: 0 PER 100 WBC
PDW BLD-RTO: 12.8 % (ref 11.8–14.4)
PLATELET # BLD: 308 K/UL (ref 138–453)
PLATELET ESTIMATE: ABNORMAL
PMV BLD AUTO: 10.3 FL (ref 8.1–13.5)
POTASSIUM SERPL-SCNC: 3.9 MMOL/L (ref 3.7–5.3)
POTASSIUM SERPL-SCNC: 3.9 MMOL/L (ref 3.7–5.3)
POTASSIUM, UR: 14.2 MMOL/L
PTH INTACT: 38.55 PG/ML (ref 15–65)
RBC # BLD: 3.79 M/UL (ref 3.95–5.11)
RBC # BLD: ABNORMAL 10*6/UL
SEG NEUTROPHILS: 61 % (ref 36–65)
SEGMENTED NEUTROPHILS ABSOLUTE COUNT: 4.94 K/UL (ref 1.5–8.1)
SODIUM BLD-SCNC: 134 MMOL/L (ref 135–144)
SODIUM BLD-SCNC: 134 MMOL/L (ref 135–144)
SODIUM,UR: 76 MMOL/L
TOTAL PROTEIN, URINE: 11 MG/DL
TRIGL SERPL-MCNC: 213 MG/DL
URINE TOTAL PROTEIN CREATININE RATIO: 0.12 (ref 0–0.2)
VITAMIN D 25-HYDROXY: 58.3 NG/ML (ref 30–100)
VLDLC SERPL CALC-MCNC: ABNORMAL MG/DL (ref 1–30)
WBC # BLD: 8.1 K/UL (ref 3.5–11.3)
WBC # BLD: ABNORMAL 10*3/UL

## 2021-06-17 PROCEDURE — 36415 COLL VENOUS BLD VENIPUNCTURE: CPT

## 2021-06-17 PROCEDURE — 83735 ASSAY OF MAGNESIUM: CPT

## 2021-06-17 PROCEDURE — 85025 COMPLETE CBC W/AUTO DIFF WBC: CPT

## 2021-06-17 PROCEDURE — 84300 ASSAY OF URINE SODIUM: CPT

## 2021-06-17 PROCEDURE — 82310 ASSAY OF CALCIUM: CPT

## 2021-06-17 PROCEDURE — 80061 LIPID PANEL: CPT

## 2021-06-17 PROCEDURE — 83883 ASSAY NEPHELOMETRY NOT SPEC: CPT

## 2021-06-17 PROCEDURE — 84165 PROTEIN E-PHORESIS SERUM: CPT

## 2021-06-17 PROCEDURE — 80048 BASIC METABOLIC PNL TOTAL CA: CPT

## 2021-06-17 PROCEDURE — 84166 PROTEIN E-PHORESIS/URINE/CSF: CPT

## 2021-06-17 PROCEDURE — 82306 VITAMIN D 25 HYDROXY: CPT

## 2021-06-17 PROCEDURE — 84133 ASSAY OF URINE POTASSIUM: CPT

## 2021-06-17 PROCEDURE — 82570 ASSAY OF URINE CREATININE: CPT

## 2021-06-17 PROCEDURE — 82436 ASSAY OF URINE CHLORIDE: CPT

## 2021-06-17 PROCEDURE — 86160 COMPLEMENT ANTIGEN: CPT

## 2021-06-17 PROCEDURE — 82565 ASSAY OF CREATININE: CPT

## 2021-06-17 PROCEDURE — 84156 ASSAY OF PROTEIN URINE: CPT

## 2021-06-17 PROCEDURE — 80074 ACUTE HEPATITIS PANEL: CPT

## 2021-06-17 PROCEDURE — 84155 ASSAY OF PROTEIN SERUM: CPT

## 2021-06-17 PROCEDURE — 86334 IMMUNOFIX E-PHORESIS SERUM: CPT

## 2021-06-17 PROCEDURE — 87205 SMEAR GRAM STAIN: CPT

## 2021-06-17 PROCEDURE — 83970 ASSAY OF PARATHORMONE: CPT

## 2021-06-18 LAB
ALBUMIN (CALCULATED): 5.3 G/DL (ref 3.2–5.2)
ALBUMIN PERCENT: 70 % (ref 45–65)
ALPHA 1 PERCENT: 2 % (ref 3–6)
ALPHA 2 PERCENT: 11 % (ref 6–13)
ALPHA-1-GLOBULIN: 0.2 G/DL (ref 0.1–0.4)
ALPHA-2-GLOBULIN: 0.8 G/DL (ref 0.5–0.9)
BETA GLOBULIN: 0.7 G/DL (ref 0.5–1.1)
BETA PERCENT: 10 % (ref 11–19)
GAMMA GLOBULIN %: 7 % (ref 9–20)
GAMMA GLOBULIN: 0.6 G/DL (ref 0.5–1.5)
P E INTERPRETATION, U: NORMAL
PATHOLOGIST: ABNORMAL
PATHOLOGIST: NORMAL
PATHOLOGIST: NORMAL
PROTEIN ELECTROPHORESIS, SERUM: ABNORMAL
SERUM IFX INTERP: NORMAL
SPECIMEN TYPE: NORMAL
TOTAL PROT. SUM,%: 100 % (ref 98–102)
TOTAL PROT. SUM: 7.6 G/DL (ref 6.3–8.2)
TOTAL PROTEIN: 7.6 G/DL (ref 6.4–8.3)
URINE TOTAL PROTEIN: 11 MG/DL

## 2021-06-22 LAB
-: NORMAL
REASON FOR REJECTION: NORMAL
ZZ NTE CLEAN UP: ORDERED TEST: NORMAL
ZZ NTE WITH NAME CLEAN UP: SPECIMEN SOURCE: NORMAL

## 2021-06-30 ENCOUNTER — HOSPITAL ENCOUNTER (OUTPATIENT)
Age: 61
Setting detail: SPECIMEN
Discharge: HOME | End: 2021-06-30
Payer: COMMERCIAL

## 2021-06-30 PROCEDURE — 82570 ASSAY OF URINE CREATININE: CPT

## 2021-06-30 PROCEDURE — 82340 ASSAY OF CALCIUM IN URINE: CPT

## 2021-07-02 LAB
CALCIUM TIMED UR: NORMAL MG/X H
CALCIUM URINE: 3.9 MG/DL
CALCIUM, URINE: 34 MG/24 H (ref 20–275)
CREATININE TIMED UR: NORMAL MG/ X H
CREATININE URINE: 85.3 MG/DL
CREATININE, 24H UR: 746 MG/24 H (ref 740–1570)
HOURS COLLECTED: 24 H
HOURS COLLECTED: 24 H
VOLUME: 875 ML
VOLUME: 875 ML

## 2021-09-09 ENCOUNTER — HOSPITAL ENCOUNTER (OUTPATIENT)
Dept: LAB | Age: 61
Discharge: HOME | End: 2021-09-09
Payer: COMMERCIAL

## 2021-09-09 DIAGNOSIS — N18.32 STAGE 3B CHRONIC KIDNEY DISEASE (HCC): ICD-10-CM

## 2021-09-09 LAB
-: ABNORMAL
AMORPHOUS: ABNORMAL
BACTERIA: ABNORMAL
BILIRUBIN URINE: NEGATIVE
CASTS UA: ABNORMAL /LPF (ref 0–2)
COLOR: ABNORMAL
COMMENT UA: ABNORMAL
CREATININE URINE: 153.7 MG/DL (ref 28–217)
CRYSTALS, UA: ABNORMAL /HPF
EPITHELIAL CELLS UA: ABNORMAL /HPF (ref 0–5)
GLUCOSE URINE: NEGATIVE
KETONES, URINE: NEGATIVE
LEUKOCYTE ESTERASE, URINE: NEGATIVE
MUCUS: ABNORMAL
NITRITE, URINE: NEGATIVE
OTHER OBSERVATIONS UA: ABNORMAL
PH UA: 6.5 (ref 5–6)
PROTEIN UA: NEGATIVE
RBC UA: ABNORMAL /HPF (ref 0–4)
RENAL EPITHELIAL, UA: ABNORMAL /HPF
SPECIFIC GRAVITY UA: 1.02 (ref 1.01–1.02)
TOTAL PROTEIN, URINE: 22 MG/DL
TRICHOMONAS: ABNORMAL
TURBIDITY: ABNORMAL
URINE HGB: NEGATIVE
UROBILINOGEN, URINE: NORMAL
WBC UA: ABNORMAL /HPF (ref 0–4)
YEAST: ABNORMAL

## 2021-09-09 PROCEDURE — 81001 URINALYSIS AUTO W/SCOPE: CPT

## 2021-09-09 PROCEDURE — 82570 ASSAY OF URINE CREATININE: CPT

## 2021-09-09 PROCEDURE — 84156 ASSAY OF PROTEIN URINE: CPT

## 2021-09-16 ENCOUNTER — TELEPHONE (OUTPATIENT)
Dept: NEPHROLOGY | Age: 61
End: 2021-09-16

## 2021-09-16 NOTE — TELEPHONE ENCOUNTER
Patient called stating she needs to reschedule her appointment with Dr Santino Roca for 9/20/2021 for follow up (three month follow up, labs and ZACH)   Her  is having surgery so she cannot come in on 9/20/2021 Please advise where to reschedule patient as bext available appt for a follow up is in December.   Call her at [phone]

## 2021-09-16 NOTE — TELEPHONE ENCOUNTER
Voicemail left for patient. She came in on 9/9/21 for labs, but only urine sample collected. Informed patient that there should be an order for blood work as well and once she completes CMP we can discuss with Dr. Mable Baldwin when she needs to come back.

## 2021-09-20 ENCOUNTER — HOSPITAL ENCOUNTER (OUTPATIENT)
Dept: LAB | Age: 61
Discharge: HOME | End: 2021-09-20
Payer: COMMERCIAL

## 2021-09-20 DIAGNOSIS — N18.32 STAGE 3B CHRONIC KIDNEY DISEASE (HCC): ICD-10-CM

## 2021-09-20 LAB
ABSOLUTE EOS #: 0.19 K/UL (ref 0–0.44)
ABSOLUTE IMMATURE GRANULOCYTE: 0.03 K/UL (ref 0–0.3)
ABSOLUTE LYMPH #: 2.13 K/UL (ref 1.1–3.7)
ABSOLUTE MONO #: 0.72 K/UL (ref 0.1–1.2)
ANION GAP SERPL CALCULATED.3IONS-SCNC: 13 MMOL/L (ref 9–17)
BASOPHILS # BLD: 1 % (ref 0–2)
BASOPHILS ABSOLUTE: 0.07 K/UL (ref 0–0.2)
BUN BLDV-MCNC: 17 MG/DL (ref 6–20)
BUN/CREAT BLD: 13 (ref 9–20)
CALCIUM SERPL-MCNC: 10.2 MG/DL (ref 8.6–10.4)
CHLORIDE BLD-SCNC: 103 MMOL/L (ref 98–107)
CO2: 26 MMOL/L (ref 20–31)
CREAT SERPL-MCNC: 1.33 MG/DL (ref 0.5–0.9)
DIFFERENTIAL TYPE: ABNORMAL
EOSINOPHILS RELATIVE PERCENT: 2 % (ref 1–4)
GFR AFRICAN AMERICAN: 49 ML/MIN
GFR NON-AFRICAN AMERICAN: 41 ML/MIN
GFR SERPL CREATININE-BSD FRML MDRD: ABNORMAL ML/MIN/{1.73_M2}
GFR SERPL CREATININE-BSD FRML MDRD: ABNORMAL ML/MIN/{1.73_M2}
GLUCOSE BLD-MCNC: 121 MG/DL (ref 70–99)
HCT VFR BLD CALC: 39.9 % (ref 36.3–47.1)
HEMOGLOBIN: 13.1 G/DL (ref 11.9–15.1)
IMMATURE GRANULOCYTES: 0 %
LYMPHOCYTES # BLD: 20 % (ref 24–43)
MCH RBC QN AUTO: 32.5 PG (ref 25.2–33.5)
MCHC RBC AUTO-ENTMCNC: 32.8 G/DL (ref 25.2–33.5)
MCV RBC AUTO: 99 FL (ref 82.6–102.9)
MONOCYTES # BLD: 7 % (ref 3–12)
NRBC AUTOMATED: 0 PER 100 WBC
PDW BLD-RTO: 13.4 % (ref 11.8–14.4)
PLATELET # BLD: 316 K/UL (ref 138–453)
PLATELET ESTIMATE: ABNORMAL
PMV BLD AUTO: 10 FL (ref 8.1–13.5)
POTASSIUM SERPL-SCNC: 4.6 MMOL/L (ref 3.7–5.3)
RBC # BLD: 4.03 M/UL (ref 3.95–5.11)
RBC # BLD: ABNORMAL 10*6/UL
SEG NEUTROPHILS: 70 % (ref 36–65)
SEGMENTED NEUTROPHILS ABSOLUTE COUNT: 7.34 K/UL (ref 1.5–8.1)
SODIUM BLD-SCNC: 142 MMOL/L (ref 135–144)
WBC # BLD: 10.5 K/UL (ref 3.5–11.3)
WBC # BLD: ABNORMAL 10*3/UL

## 2021-09-20 PROCEDURE — 80048 BASIC METABOLIC PNL TOTAL CA: CPT

## 2021-09-20 PROCEDURE — 85025 COMPLETE CBC W/AUTO DIFF WBC: CPT

## 2021-09-20 PROCEDURE — 36415 COLL VENOUS BLD VENIPUNCTURE: CPT

## 2021-09-28 ENCOUNTER — HOSPITAL ENCOUNTER (OUTPATIENT)
Dept: GENERAL RADIOLOGY | Age: 61
Discharge: HOME | End: 2021-09-30
Payer: COMMERCIAL

## 2021-09-28 ENCOUNTER — HOSPITAL ENCOUNTER (OUTPATIENT)
Dept: BONE DENSITY | Age: 61
Discharge: HOME | End: 2021-09-30
Payer: COMMERCIAL

## 2021-09-28 DIAGNOSIS — E21.3 HYPERPARATHYROIDISM (HCC): ICD-10-CM

## 2021-09-28 DIAGNOSIS — E83.52 HYPERCALCEMIA: ICD-10-CM

## 2021-09-28 PROCEDURE — 72072 X-RAY EXAM THORAC SPINE 3VWS: CPT

## 2021-09-28 PROCEDURE — 77085 DXA BONE DENSITY AXL VRT FX: CPT

## 2021-09-28 PROCEDURE — 72100 X-RAY EXAM L-S SPINE 2/3 VWS: CPT

## 2021-09-28 NOTE — TELEPHONE ENCOUNTER
Patient is scheduled for 12/6/21 and will have labs done prior to that appointment. Patient notified.

## 2021-10-01 DIAGNOSIS — I10 ESSENTIAL HYPERTENSION: ICD-10-CM

## 2021-10-04 RX ORDER — HYDROCHLOROTHIAZIDE 12.5 MG/1
CAPSULE, GELATIN COATED ORAL
Qty: 90 CAPSULE | Refills: 1 | Status: SHIPPED | OUTPATIENT
Start: 2021-10-04 | End: 2022-04-01

## 2021-10-15 ENCOUNTER — TELEPHONE (OUTPATIENT)
Dept: FAMILY MEDICINE CLINIC | Age: 61
End: 2021-10-15

## 2021-10-15 DIAGNOSIS — E11.9 TYPE 2 DIABETES MELLITUS WITHOUT COMPLICATION, UNSPECIFIED WHETHER LONG TERM INSULIN USE (HCC): ICD-10-CM

## 2021-10-15 DIAGNOSIS — E78.2 MIXED HYPERLIPIDEMIA: Primary | ICD-10-CM

## 2021-10-19 ENCOUNTER — HOSPITAL ENCOUNTER (OUTPATIENT)
Dept: LAB | Age: 61
Discharge: HOME | End: 2021-10-19
Payer: COMMERCIAL

## 2021-10-19 DIAGNOSIS — E78.2 MIXED HYPERLIPIDEMIA: ICD-10-CM

## 2021-10-19 DIAGNOSIS — E11.9 TYPE 2 DIABETES MELLITUS WITHOUT COMPLICATION, UNSPECIFIED WHETHER LONG TERM INSULIN USE (HCC): ICD-10-CM

## 2021-10-19 LAB
CALCIUM SERPL-MCNC: 10.6 MG/DL (ref 8.6–10.4)
CHOLESTEROL/HDL RATIO: 2.6
CHOLESTEROL: 203 MG/DL
HDLC SERPL-MCNC: 79 MG/DL
LDL CHOLESTEROL: 94 MG/DL (ref 0–130)
TRIGL SERPL-MCNC: 152 MG/DL
VLDLC SERPL CALC-MCNC: ABNORMAL MG/DL (ref 1–30)

## 2021-10-19 PROCEDURE — 83036 HEMOGLOBIN GLYCOSYLATED A1C: CPT

## 2021-10-19 PROCEDURE — 36415 COLL VENOUS BLD VENIPUNCTURE: CPT

## 2021-10-19 PROCEDURE — 82310 ASSAY OF CALCIUM: CPT

## 2021-10-19 PROCEDURE — 83970 ASSAY OF PARATHORMONE: CPT

## 2021-10-19 PROCEDURE — 80061 LIPID PANEL: CPT

## 2021-10-20 LAB
ESTIMATED AVERAGE GLUCOSE: 108 MG/DL
HBA1C MFR BLD: 5.4 % (ref 4–6)
PTH INTACT: 41.48 PG/ML (ref 15–65)

## 2021-10-22 ENCOUNTER — OFFICE VISIT (OUTPATIENT)
Dept: FAMILY MEDICINE CLINIC | Age: 61
End: 2021-10-22
Payer: COMMERCIAL

## 2021-10-22 ENCOUNTER — HOSPITAL ENCOUNTER (OUTPATIENT)
Dept: NUCLEAR MEDICINE | Age: 61
Discharge: HOME | End: 2021-10-24
Payer: COMMERCIAL

## 2021-10-22 VITALS
BODY MASS INDEX: 24.91 KG/M2 | HEIGHT: 66 IN | WEIGHT: 155 LBS | OXYGEN SATURATION: 100 % | SYSTOLIC BLOOD PRESSURE: 128 MMHG | HEART RATE: 78 BPM | DIASTOLIC BLOOD PRESSURE: 84 MMHG

## 2021-10-22 DIAGNOSIS — E11.9 TYPE 2 DIABETES MELLITUS WITHOUT COMPLICATION, UNSPECIFIED WHETHER LONG TERM INSULIN USE (HCC): Primary | ICD-10-CM

## 2021-10-22 DIAGNOSIS — E21.3 HYPERPARATHYROIDISM, UNSPECIFIED (HCC): ICD-10-CM

## 2021-10-22 DIAGNOSIS — E83.52 HYPERCALCEMIA: ICD-10-CM

## 2021-10-22 DIAGNOSIS — I10 ESSENTIAL HYPERTENSION: ICD-10-CM

## 2021-10-22 DIAGNOSIS — F10.10 ALCOHOL ABUSE: ICD-10-CM

## 2021-10-22 DIAGNOSIS — E78.2 MIXED HYPERLIPIDEMIA: ICD-10-CM

## 2021-10-22 DIAGNOSIS — K21.9 GASTROESOPHAGEAL REFLUX DISEASE WITHOUT ESOPHAGITIS: ICD-10-CM

## 2021-10-22 PROCEDURE — 78803 RP LOCLZJ TUM SPECT 1 AREA: CPT

## 2021-10-22 PROCEDURE — A9503 TC99M MEDRONATE: HCPCS | Performed by: INTERNAL MEDICINE

## 2021-10-22 PROCEDURE — 99214 OFFICE O/P EST MOD 30 MIN: CPT | Performed by: NURSE PRACTITIONER

## 2021-10-22 PROCEDURE — 3430000000 HC RX DIAGNOSTIC RADIOPHARMACEUTICAL: Performed by: INTERNAL MEDICINE

## 2021-10-22 RX ORDER — AMOXICILLIN 500 MG/1
CAPSULE ORAL
COMMUNITY
Start: 2021-10-11 | End: 2022-05-04

## 2021-10-22 RX ORDER — ATORVASTATIN CALCIUM 20 MG/1
20 TABLET, FILM COATED ORAL DAILY
Qty: 90 TABLET | Refills: 1 | Status: SHIPPED | OUTPATIENT
Start: 2021-10-22 | End: 2022-07-08 | Stop reason: SDUPTHER

## 2021-10-22 RX ORDER — ESCITALOPRAM OXALATE 10 MG/1
TABLET ORAL
COMMUNITY
Start: 2021-09-23

## 2021-10-22 RX ORDER — EXENATIDE 2 MG/.65ML
2 INJECTION, SUSPENSION, EXTENDED RELEASE SUBCUTANEOUS WEEKLY
Qty: 5 PEN | Refills: 3 | Status: CANCELLED | OUTPATIENT
Start: 2021-10-22 | End: 2022-01-20

## 2021-10-22 RX ORDER — TC 99M MEDRONATE 20 MG/10ML
25 INJECTION, POWDER, LYOPHILIZED, FOR SOLUTION INTRAVENOUS
Status: COMPLETED | OUTPATIENT
Start: 2021-10-22 | End: 2021-10-22

## 2021-10-22 RX ORDER — OMEPRAZOLE 20 MG/1
20 CAPSULE, DELAYED RELEASE ORAL DAILY
Qty: 90 CAPSULE | Refills: 1 | Status: SHIPPED | OUTPATIENT
Start: 2021-10-22

## 2021-10-22 RX ADMIN — TC 99M MEDRONATE 25 MILLICURIE: 20 INJECTION, POWDER, LYOPHILIZED, FOR SOLUTION INTRAVENOUS at 09:35

## 2021-10-22 SDOH — ECONOMIC STABILITY: FOOD INSECURITY: WITHIN THE PAST 12 MONTHS, THE FOOD YOU BOUGHT JUST DIDN'T LAST AND YOU DIDN'T HAVE MONEY TO GET MORE.: NEVER TRUE

## 2021-10-22 SDOH — ECONOMIC STABILITY: FOOD INSECURITY: WITHIN THE PAST 12 MONTHS, YOU WORRIED THAT YOUR FOOD WOULD RUN OUT BEFORE YOU GOT MONEY TO BUY MORE.: NEVER TRUE

## 2021-10-22 ASSESSMENT — PATIENT HEALTH QUESTIONNAIRE - PHQ9
2. FEELING DOWN, DEPRESSED OR HOPELESS: 1
1. LITTLE INTEREST OR PLEASURE IN DOING THINGS: 1
SUM OF ALL RESPONSES TO PHQ QUESTIONS 1-9: 2
SUM OF ALL RESPONSES TO PHQ9 QUESTIONS 1 & 2: 2
SUM OF ALL RESPONSES TO PHQ QUESTIONS 1-9: 2
SUM OF ALL RESPONSES TO PHQ QUESTIONS 1-9: 2

## 2021-10-22 ASSESSMENT — ENCOUNTER SYMPTOMS
SORE THROAT: 0
COUGH: 0
NAUSEA: 0
ABDOMINAL PAIN: 0
HEARTBURN: 0
DIARRHEA: 0
VOMITING: 0
WHEEZING: 0
SHORTNESS OF BREATH: 0
BLURRED VISION: 0

## 2021-10-22 ASSESSMENT — SOCIAL DETERMINANTS OF HEALTH (SDOH): HOW HARD IS IT FOR YOU TO PAY FOR THE VERY BASICS LIKE FOOD, HOUSING, MEDICAL CARE, AND HEATING?: NOT VERY HARD

## 2021-10-22 NOTE — PROGRESS NOTES
[address] [facility]  [address] [facility], [id number]  [phone]      HPI:   Pt presents to the clinic for a 6 month follow up. She continues to drink alcohol. She  Drinks about 3 drinks about 3 times a week. Diabetes  She presents for her follow-up diabetic visit. She has type 2 diabetes mellitus. Her disease course has been stable. There are no hypoglycemic associated symptoms. Pertinent negatives for hypoglycemia include no dizziness or headaches. Pertinent negatives for diabetes include no blurred vision, no chest pain, no fatigue, no polydipsia and no polyuria. There are no hypoglycemic complications. Symptoms are stable. There are no diabetic complications. Risk factors for coronary artery disease include diabetes mellitus, dyslipidemia, hypertension and post-menopausal. Current diabetic treatment includes oral agent (monotherapy). She is compliant with treatment most of the time. She is following a generally healthy diet. Meal planning includes avoidance of concentrated sweets. She has not had a previous visit with a dietitian. She participates in exercise every other day. There is no change in her home blood glucose trend. An ACE inhibitor/angiotensin II receptor blocker is being taken. She does not see a podiatrist.Eye exam is not current (due in January ). Hyperlipidemia  This is a chronic problem. The current episode started more than 1 year ago. The problem is controlled. Recent lipid tests were reviewed and are normal. Exacerbating diseases include diabetes. She has no history of obesity. Factors aggravating her hyperlipidemia include fatty foods and thiazides. Pertinent negatives include no chest pain, leg pain, myalgias or shortness of breath. Current antihyperlipidemic treatment includes statins. The current treatment provides moderate improvement of lipids. Compliance problems include adherence to exercise and adherence to diet.   Risk factors for coronary artery disease include diabetes mellitus, dyslipidemia, hypertension and post-menopausal.   Gastroesophageal Reflux  She reports no abdominal pain, no chest pain, no coughing, no heartburn, no nausea, no sore throat or no wheezing. This is a chronic problem. The current episode started more than 1 year ago. The problem occurs rarely. The problem has been unchanged. Nothing aggravates the symptoms. Pertinent negatives include no fatigue. There are no known risk factors. She has tried a PPI for the symptoms. The treatment provided moderate relief. Hypertension  This is a chronic problem. The current episode started more than 1 year ago. The problem is unchanged. The problem is controlled. Pertinent negatives include no anxiety, blurred vision, chest pain, headaches, malaise/fatigue, palpitations, peripheral edema or shortness of breath. There are no associated agents to hypertension. Risk factors for coronary artery disease include diabetes mellitus, dyslipidemia, post-menopausal state and sedentary lifestyle. Past treatments include diuretics and ACE inhibitors. Compliance problems include diet and exercise.         Current Outpatient Medications   Medication Sig Dispense Refill    atorvastatin (LIPITOR) 20 MG tablet Take 1 tablet by mouth daily 90 tablet 1    omeprazole (PRILOSEC) 20 MG delayed release capsule Take 1 capsule by mouth daily 90 capsule 1    lamoTRIgine (LAMICTAL) 200 MG tablet Take 200 mg by mouth daily      SAPHRIS 10 MG SUBL sublingual tablet nightly       amoxicillin (AMOXIL) 500 MG capsule take 1 capsule by mouth three times a day for 7 days      escitalopram (LEXAPRO) 10 MG tablet       hydroCHLOROthiazide (MICROZIDE) 12.5 MG capsule TAKE 1 CAPSULE EVERY MORNING 90 capsule 1    lisinopril (PRINIVIL;ZESTRIL) 40 MG tablet TAKE 1 TABLET DAILY 90 tablet 1    Exenatide (BYDUREON) 2 MG PEN Inject 1 pen into the skin once a week 5 pen 3    Blood Pressure KIT Check blood pressure daily and as needed E11.9 (hypertension) 1 kit 0    Blood Pressure Monitor CARY Monitor BP daily; Dx. Hypertension (I10) 1 Device 0    Insulin Pen Needle ([employer] PEN NEEDLES) 31G X 6 MM MISC 1 each by Does not apply route daily 100 each 3    Glucose Blood (BLOOD GLUCOSE TEST STRIPS) STRP Diagnosis 250.90, tests once daily 50 strip 11    Lancets MISC Diagnosis 250.90, tests once daily 50 each 11    Blood Glucose Monitoring Suppl CARY Diagnosis 250.90, tests once daily 1 Device 0    Cholecalciferol (VITAMIN D PO) Take by mouth      Multiple Vitamins-Minerals (MULTIVITAMIN PO) Take  by mouth daily. No current facility-administered medications for this visit. Allergies   Allergen Reactions    Gabapentin Anaphylaxis    Lyrica [Pregabalin]     Wellbutrin [Bupropion] Other (See Comments)     Seeing double    Ciprofloxacin Other (See Comments)     Stomach pain    Keflex [Cephalexin] Other (See Comments)     Stomach pain       All patients pastmedical, surgical, social and family history has been reviewed. Subjective:      Review of Systems   Constitutional: Negative for activity change, appetite change, fatigue, fever and malaise/fatigue. HENT: Negative for sore throat. Eyes: Negative for blurred vision. Respiratory: Negative for cough, shortness of breath and wheezing. Cardiovascular: Negative for chest pain and palpitations. Gastrointestinal: Negative for abdominal pain, diarrhea, heartburn, nausea and vomiting. Endocrine: Negative for polydipsia and polyuria. Musculoskeletal: Negative for myalgias. Skin: Negative. Neurological: Negative for dizziness, light-headedness and headaches. Psychiatric/Behavioral: Negative. Objective:      Physical Exam  Vitals and nursing note reviewed. Constitutional:       Appearance: Normal appearance. HENT:      Head: Normocephalic and atraumatic.       Right Ear: Tympanic membrane, ear canal and external ear normal.      Left Ear: Tympanic membrane, ear canal and external ear normal.   Eyes:      Pupils: Pupils are equal, round, and reactive to light. Cardiovascular:      Rate and Rhythm: Normal rate and regular rhythm. Heart sounds: Normal heart sounds. Pulmonary:      Effort: Pulmonary effort is normal.      Breath sounds: Normal breath sounds. Skin:     Capillary Refill: Capillary refill takes less than 2 seconds. Neurological:      General: No focal deficit present. Mental Status: She is alert and oriented to person, place, and time. Psychiatric:         Mood and Affect: Mood normal.         Behavior: Behavior normal.         Thought Content: Thought content normal.         Judgment: Judgment normal.        Diabetic foot check: Normal strength and range of motion of toes, feet, and ankles bilaterally. No joint deformity. No cyanosis or clubbing. 100% sensation at all 22 test points with the 10 gram filament. Dorsalis pedis pulses intact bilaterally. Capillary refill at the toes was less than 2 seconds. Light touch sensation intact bilaterally. . No skin breakdown, erythema, rub spots, blisters, scaling, or ulcers. No calluses or corns. Hospital Outpatient Visit on 10/19/2021   Component Date Value Ref Range Status    Hemoglobin A1C 10/19/2021 5.4  4.0 - 6.0 % Final    Estimated Avg Glucose 10/19/2021 108  mg/dL Final    Comment: The ADA and AACC recommend providing the estimated average glucose result to permit better   patient understanding of their HBA1c result.       Cholesterol 10/19/2021 203* <200 mg/dL Final    Comment:    Cholesterol Guidelines:      <200  Desirable   200-240  Borderline      >240  Undesirable         HDL 10/19/2021 79  >40 mg/dL Final    Comment:    HDL Guidelines:    <40     Undesirable   40-59    Borderline    >59     Desirable         LDL Cholesterol 10/19/2021 94  0 - 130 mg/dL Final    Comment:    LDL Guidelines:     <100    Desirable   100-129   Near to/above Desirable   130-159   Borderline >159   Undesirable     Direct (measured) LDL and calculated LDL are not interchangeable tests.  Chol/HDL Ratio 10/19/2021 2.6  <5 Final            Triglycerides 10/19/2021 152* <150 mg/dL Final    Comment:    Triglyceride Guidelines:     <150   Desirable   150-199  Borderline   200-499  High     >499   Very high   Based on AHA Guidelines for fasting triglyceride, October 2012.  VLDL 10/19/2021 NOT REPORTED  1 - 30 mg/dL Final   Hospital Outpatient Visit on 10/19/2021   Component Date Value Ref Range Status    Calcium 10/19/2021 10.6* 8.6 - 10.4 mg/dL Final    Pth Intact 10/19/2021 41.48  15.0 - 65.0 pg/mL Final    Comment: SAMPLES FROM PATIENTS ROUTINELY RECEIVING HIGH DOSE BIOTIN THERAPY MAY SHOW FALSELY   DEPRESSED RESULTS. ADDITIONAL INFORMATION MAY BE REQUIRED FOR DIAGNOSIS.      Hospital Outpatient Visit on 09/20/2021   Component Date Value Ref Range Status    WBC 09/20/2021 10.5  3.5 - 11.3 k/uL Final    RBC 09/20/2021 4.03  3.95 - 5.11 m/uL Final    Hemoglobin 09/20/2021 13.1  11.9 - 15.1 g/dL Final    Hematocrit 09/20/2021 39.9  36.3 - 47.1 % Final    MCV 09/20/2021 99.0  82.6 - 102.9 fL Final    MCH 09/20/2021 32.5  25.2 - 33.5 pg Final    MCHC 09/20/2021 32.8  25.2 - 33.5 g/dL Final    RDW 09/20/2021 13.4  11.8 - 14.4 % Final    Platelets 94/42/4994 316  138 - 453 k/uL Final    MPV 09/20/2021 10.0  8.1 - 13.5 fL Final    NRBC Automated 09/20/2021 0.0  0.0 per 100 WBC Final    Differential Type 09/20/2021 NOT REPORTED   Final    Seg Neutrophils 09/20/2021 70* 36 - 65 % Final    Lymphocytes 09/20/2021 20* 24 - 43 % Final    Monocytes 09/20/2021 7  3 - 12 % Final    Eosinophils % 09/20/2021 2  1 - 4 % Final    Basophils 09/20/2021 1  0 - 2 % Final    Immature Granulocytes 09/20/2021 0  0 % Final    Segs Absolute 09/20/2021 7.34  1.50 - 8.10 k/uL Final    Absolute Lymph # 09/20/2021 2.13  1.10 - 3.70 k/uL Final    Absolute Mono # 09/20/2021 0.72  0.10 - 1.20 k/uL Final    Absolute Eos # 09/20/2021 0.19  0.00 - 0.44 k/uL Final    Basophils Absolute 09/20/2021 0.07  0.00 - 0.20 k/uL Final    Absolute Immature Granulocyte 09/20/2021 0.03  0.00 - 0.30 k/uL Final    WBC Morphology 09/20/2021 NOT REPORTED   Final    RBC Morphology 09/20/2021 NOT REPORTED   Final    Platelet Estimate 32/18/8368 NOT REPORTED   Final    Glucose 09/20/2021 121* 70 - 99 mg/dL Final    BUN 09/20/2021 17  6 - 20 mg/dL Final    CREATININE 09/20/2021 1.33* 0.50 - 0.90 mg/dL Final    Bun/Cre Ratio 09/20/2021 13  9 - 20 Final    Calcium 09/20/2021 10.2  8.6 - 10.4 mg/dL Final    Sodium 09/20/2021 142  135 - 144 mmol/L Final    Potassium 09/20/2021 4.6  3.7 - 5.3 mmol/L Final    Chloride 09/20/2021 103  98 - 107 mmol/L Final    CO2 09/20/2021 26  20 - 31 mmol/L Final    Anion Gap 09/20/2021 13  9 - 17 mmol/L Final    GFR Non- 09/20/2021 41* >60 mL/min Final    GFR  09/20/2021 49* >60 mL/min Final    GFR Comment 09/20/2021        Final    Comment: Average GFR for 52-63 years old:   80 mL/min/1.73sq m  Chronic Kidney Disease:   <60 mL/min/1.73sq m  Kidney failure:   <15 mL/min/1.73sq m              eGFR calculated using average adult body mass.  Additional eGFR calculator available at:        "[employer] [employer]br            GFR Staging 09/20/2021 NOT REPORTED   Final   Hospital Outpatient Visit on 09/09/2021   Component Date Value Ref Range Status    Color, UA 09/09/2021 NOT REPORTED  YELLOW Final    Turbidity UA 09/09/2021 NOT REPORTED  CLEAR Final    Glucose, Ur 09/09/2021 NEGATIVE  NEGATIVE Final    Bilirubin Urine 09/09/2021 NEGATIVE  NEGATIVE Final    Ketones, Urine 09/09/2021 NEGATIVE  NEGATIVE Final    Specific Gravity, UA 09/09/2021 1.020  1.010 - 1.025 Final    Urine Hgb 09/09/2021 NEGATIVE  NEGATIVE Final    pH, UA 09/09/2021 6.5* 5.0 - 6.0 Final    Protein, UA 09/09/2021 NEGATIVE  NEGATIVE Final    Urobilinogen, Urine 09/09/2021 Normal  Normal Final    Nitrite, Urine 09/09/2021 NEGATIVE  NEGATIVE Final    Leukocyte Esterase, Urine 09/09/2021 NEGATIVE  NEGATIVE Final    Urinalysis Comments 09/09/2021 NOT REPORTED   Final    - 09/09/2021        Final    WBC, UA 09/09/2021 0 TO 4  0 - 4 /HPF Final    RBC, UA 09/09/2021 None  0 - 4 /HPF Final    Casts UA 09/09/2021 NOT REPORTED  0 - 2 /LPF Final    Crystals, UA 09/09/2021 NOT REPORTED  None /HPF Final    Epithelial Cells UA 09/09/2021 0 TO 4  0 - 5 /HPF Final    Renal Epithelial, UA 09/09/2021 NOT REPORTED  0 /HPF Final    Bacteria, UA 09/09/2021 TRACE* None Final    Mucus, UA 09/09/2021 NOT REPORTED  None Final    Trichomonas, UA 09/09/2021 NOT REPORTED  None Final    Amorphous, UA 09/09/2021 NOT REPORTED  None Final    Other Observations UA 09/09/2021 NOT REPORTED  NOT REQ. Final    Yeast, UA 09/09/2021 NOT REPORTED  None Final    Total Protein, Urine 09/09/2021 22  mg/dL Final    Creatinine, Ur 09/09/2021 153.7  28.0 - 217.0 mg/dL Final       Assessment:       Diagnosis Orders   1. Type 2 diabetes mellitus without complication, unspecified whether long term insulin use ([facility])  Comprehensive Metabolic Panel    Hemoglobin A1C    HM DIABETES FOOT EXAM   2. Mixed hyperlipidemia  atorvastatin (LIPITOR) 20 MG tablet    Comprehensive Metabolic Panel    Lipid Panel   3. Gastroesophageal reflux disease without esophagitis  omeprazole (PRILOSEC) 20 MG delayed release capsule   4. Essential hypertension     5. Alcohol abuse         Plan:      1. Type 2 diabetes -stable continue current medication   Continue to monitor blood sugars  2. Hyperlipidemia-stable continue current medication   3. GERD-stable continue current medicatin   4. HTN-controlled with medication   5. Alcohol abuse-encouraged complete cessation.    Encouraged healthy diet and daily exercise  Pt to return in 6 months for follow up   Pt to return PRN  Return in about 6 months (around 4/22/2022), or if symptoms

## 2021-11-03 ENCOUNTER — IMMUNIZATION (OUTPATIENT)
Dept: LAB | Age: 61
End: 2021-11-03
Payer: COMMERCIAL

## 2021-11-03 PROCEDURE — 91306 COVID-19, MODERNA BOOSTER VACCINE 0.25ML DOSE: CPT | Performed by: INTERNAL MEDICINE

## 2021-11-03 PROCEDURE — 0064A COVID-19, MODERNA BOOSTER VACCINE 0.25ML DOSE: CPT | Performed by: INTERNAL MEDICINE

## 2021-11-05 DIAGNOSIS — I10 ESSENTIAL HYPERTENSION: ICD-10-CM

## 2021-11-08 RX ORDER — LISINOPRIL 40 MG/1
TABLET ORAL
Qty: 90 TABLET | Refills: 3 | Status: SHIPPED | OUTPATIENT
Start: 2021-11-08 | End: 2022-11-03

## 2021-11-08 NOTE — TELEPHONE ENCOUNTER
Aaron Recio called requesting a refill of the below medication which has been pended for you:     Requested Prescriptions     Pending Prescriptions Disp Refills    lisinopril (PRINIVIL;ZESTRIL) 40 MG tablet [Pharmacy Med Name: LISINOPRIL TABS 40MG] 90 tablet 3     Sig: TAKE 1 TABLET DAILY       Last Appointment Date: 10/22/2021  Next Appointment Date: 4/22/2022    Allergies   Allergen Reactions    Gabapentin Anaphylaxis    Lyrica [Pregabalin]     Wellbutrin [Bupropion] Other (See Comments)     Seeing double    Ciprofloxacin Other (See Comments)     Stomach pain    Keflex [Cephalexin] Other (See Comments)     Stomach pain

## 2021-12-03 ENCOUNTER — HOSPITAL ENCOUNTER (OUTPATIENT)
Dept: LAB | Age: 61
Discharge: HOME | End: 2021-12-03
Payer: COMMERCIAL

## 2021-12-03 DIAGNOSIS — N18.32 STAGE 3B CHRONIC KIDNEY DISEASE (HCC): ICD-10-CM

## 2021-12-03 LAB
CREATININE URINE: 105.9 MG/DL (ref 28–217)
TOTAL PROTEIN, URINE: 10 MG/DL
URINE TOTAL PROTEIN CREATININE RATIO: 0.09 (ref 0–0.2)

## 2021-12-03 PROCEDURE — 82570 ASSAY OF URINE CREATININE: CPT

## 2021-12-03 PROCEDURE — 84156 ASSAY OF PROTEIN URINE: CPT

## 2021-12-06 ENCOUNTER — OFFICE VISIT (OUTPATIENT)
Dept: NEPHROLOGY | Age: 61
End: 2021-12-06
Payer: COMMERCIAL

## 2021-12-06 VITALS
DIASTOLIC BLOOD PRESSURE: 74 MMHG | HEART RATE: 74 BPM | BODY MASS INDEX: 25.23 KG/M2 | SYSTOLIC BLOOD PRESSURE: 132 MMHG | WEIGHT: 157 LBS | HEIGHT: 66 IN

## 2021-12-06 DIAGNOSIS — N18.32 TYPE 2 DIABETES MELLITUS WITH STAGE 3B CHRONIC KIDNEY DISEASE, WITHOUT LONG-TERM CURRENT USE OF INSULIN (HCC): ICD-10-CM

## 2021-12-06 DIAGNOSIS — N28.89 PELVIECTASIS OF KIDNEY: ICD-10-CM

## 2021-12-06 DIAGNOSIS — Z72.0 TOBACCO ABUSE: ICD-10-CM

## 2021-12-06 DIAGNOSIS — E11.22 TYPE 2 DIABETES MELLITUS WITH STAGE 3B CHRONIC KIDNEY DISEASE, WITHOUT LONG-TERM CURRENT USE OF INSULIN (HCC): ICD-10-CM

## 2021-12-06 DIAGNOSIS — N18.32 STAGE 3B CHRONIC KIDNEY DISEASE (HCC): Primary | ICD-10-CM

## 2021-12-06 DIAGNOSIS — I10 HYPERTENSION, ESSENTIAL: ICD-10-CM

## 2021-12-06 PROCEDURE — 99213 OFFICE O/P EST LOW 20 MIN: CPT | Performed by: INTERNAL MEDICINE

## 2021-12-06 NOTE — PROGRESS NOTES
Nephrology Progress Note    Angelique Ortega [address], APRN - CNP      SUBJECTIVE      Chief Complaint   Patient presents with    Chronic Kidney Disease     Stage 3b      12/6/2021:  Patient is here for follow-up of her CKD 3 likely due to diabetic and hypertensive nephrosclerosis. Patient's creatinine seems to be running around 1.2 to 1.8 mg/DL, she likely had an ALBA with creatinine going as high as 3.09 mg/DL in 4/2021-not improved. Patient doing well. No new issues. No fever, no chills, no CP, no SOB, no N/V/D, no abdomen pain, no urinary complaints, no LE edema. She now drinks about 48 oz of water per day- much improved from last visit. Also decreased her smoking from 1ppd to 5-6 cig/day. Patient is a hydrochlorothiazide 12.5 mg daily, lisinopril 40 mg daily, vitamin D replacements. Serology 6/17/2021: SPEP, immunofixation, complement levels and hepatitis panel all negative. K/L 1.39, UA negative for protein and RBC. UPC 0.09. Renal ultrasound 6/15/2021: Right kidney 9.4 cm left kidney 9.8 cm. Impression: Mild right pelviectasis, otherwise negative renal and bladder ultrasound. Last labs 9/20/2021: BUN/Cr 17/1.33, Na 142, K 4.6, Cl 103, Bicarb 26, Ca 10.2, Phos not available, PTH 38.55, Vit D 58.3, Hb 13.1, UPC 0.09 (12/3/21). BP today 132/74, HR 74. History of Present Illness 6/7/2021: This is a 63 y.o. female who presents to the office for evaluation of elevated creatinine. Patient has past medical and surgical history of diabetes type 2 since about 8-10 years ago, hypertension since 8-10 years ago, TMJ, conductive hearing loss-improved, depression, GERD, tobacco abuse, cataract removal, history of cervical dysplasia status post surgery.     Patient mentioned that she is here as she was referred by her PCP to establish care due to underlying renal dysfunction. Patient's creatinine seems to be running around 1.2 to 1.7 mg/DL till March 2021.   Then on 4/2/2021 creatinine increased to 3.09 mg/DL. Most recent creatinine was 2.36 mg/DL on 4/15/2021. Patient did start on new weight loss pill called \"GOLO Release\" with does contain multiple herbal formula extracts. She mention her Cr started to increase around that time. Now she is off of them since last 2 days. She also mentions she only drinks 1 poweraid and 1 8 oz of tea each night and no water all day. So, likely has pre-renal factors along with possible effects of the diet pill and her meds. Patient doing well. No new issues. No fever, no chills, no CP, no SOB, no N/V/D, no abdomen pain, no urinary complaints, no LE edema.     Social history: Smokes about 1 PPD since she was 18 years of ago, 2-3 drinks about 4 days/week. +ve Marijuana. H/o Cocaine, methadone and clean since 2008. Family history: Diabetes and HTN: MGM, Mother, brother. Father-lung ca. No renal issues. Allergies: Gabapentin-anaphylaxis, Lyrica reaction on mention, Wellbutrin-eyesight issues, ciprofloxacin and Keflex stomach pain. Water: Does not drink much. NSAID: No prolonged NSAIDs usage.      Patient is a hydrochlorothiazide 12.5 mg daily, lisinopril 40 mg daily, vitamin D replacements. Blood pressure today 110/76, heart rate 89. Pt denies any hx of heavy or prolonged NSAID use. There is no hx of jaundice or hepatitis or sexually transmitted disease. Pt has no hx of collagen vascular disease or vasculitis. No history of dysuria or frequency. No recent procedures involving IV contrast. There is no hx of paraprotein disease. Pt denies any hx of recurrent UTI , incontinence or nocturia or recurrent nephrolithiasis. No unusual skin rashes. No tea coloured urine . Medication reviewed and noted the use of both ace/arb and diuretic.     Patient Active Problem List    Diagnosis Date Noted    Peripheral arterial disease ([facility] [address] 03/29/2018    Chronic sinusitis 03/08/2018    Polyneuropathy associated with underlying disease ([facility] [address] 03/08/2018    Mixed hyperlipidemia 09/08/2016    Essential hypertension 09/08/2016    Obstructive sleep apnea syndrome 05/17/2016    Alcohol abuse 04/01/2016    Left cataract 03/08/2016     Overview Note:     Wagner Manley exam 2/29/2016      Type 2 diabetes mellitus without complication ([facility] [address] 03/55/2516    Depression     GERD (gastroesophageal reflux disease)     Tobacco abuse          CURRENT MEDICATIONS      Current Outpatient Medications   Medication Sig Dispense Refill    lisinopril (PRINIVIL;ZESTRIL) 40 MG tablet TAKE 1 TABLET DAILY 90 tablet 3    amoxicillin (AMOXIL) 500 MG capsule take 1 capsule by mouth three times a day for 7 days      escitalopram (LEXAPRO) 10 MG tablet       atorvastatin (LIPITOR) 20 MG tablet Take 1 tablet by mouth daily 90 tablet 1    omeprazole (PRILOSEC) 20 MG delayed release capsule Take 1 capsule by mouth daily 90 capsule 1    hydroCHLOROthiazide (MICROZIDE) 12.5 MG capsule TAKE 1 CAPSULE EVERY MORNING 90 capsule 1    Blood Pressure KIT Check blood pressure daily and as needed E11.9 (hypertension) 1 kit 0    Blood Pressure Monitor CARY Monitor BP daily; Dx. Hypertension (I10) 1 Device 0    Insulin Pen Needle ([employer] PEN NEEDLES) 31G X 6 MM MISC 1 each by Does not apply route daily 100 each 3    Glucose Blood (BLOOD GLUCOSE TEST STRIPS) STRP Diagnosis 250.90, tests once daily 50 strip 11    Lancets MISC Diagnosis 250.90, tests once daily 50 each 11    lamoTRIgine (LAMICTAL) 200 MG tablet Take 200 mg by mouth daily      Blood Glucose Monitoring Suppl CARY Diagnosis 250.90, tests once daily 1 Device 0    Cholecalciferol (VITAMIN D PO) Take by mouth      SAPHRIS 10 MG SUBL sublingual tablet nightly       Multiple Vitamins-Minerals (MULTIVITAMIN PO) Take  by mouth daily.  Exenatide (BYDUREON) 2 MG PEN Inject 1 pen into the skin once a week 5 pen 3     No current facility-administered medications for this visit.        REVIEW OF SYSTEMS     Constitutional: No fever, no chills, no lethargy, no weakness. HEENT:  No headache, otalgia, itchy eyes, nasal discharge or sore throat. Cardiac:  No chest pain, dyspnea, orthopnea or PND. Chest:   No cough, phlegm or wheezing or hemoptysis . Abdomen:  No abdominal pain, nausea or vomiting. Neuro:  No focal weakness, abnormal movements or seizure like activity. Skin:   No rashes, no itching. :   No hematuria, no pyuria, no dysuria, no flank pain. Extremities:  No swelling or joint pains. ROS was otherwise negative except as mentioned in the [address] OBJECTIVE      Vitals:    12/06/21 1003   BP: 132/74   Pulse: 74     Wt Readings from Last 2 Encounters:   12/06/21 157 lb (71.2 kg)   10/22/21 155 lb (70.3 kg)     Body mass index is 25.34 kg/m².      PHYSICAL EXAM      GENERAL APPEARANCE: awake, alert, in no acute distress  SKIN: warm and dry, no rash or erythema  EYES: conjunctivae normal and sclera anicteric  ENT: no thrush no pharyngeal congestion   NECK: supple    PULMONARY: clear to auscultation and no wheezing noted   CADRDIOVASCULAR: normal S1 & S2,  no murmur   ABDOMEN: soft nontender, bowel sounds, present, no organomegaly, no ascites   EXTREMITIES: no cyanosis, clubbing or edema  NEURO: alert and oriented, no deficits    LABS    CBC:   Lab Results   Component Value Date    WBC 10.5 09/20/2021    HGB 13.1 09/20/2021    HCT 39.9 09/20/2021    MCV 99.0 09/20/2021    RDW 13.4 09/20/2021     09/20/2021    MPV 10.0 09/20/2021      BMP:   Lab Results   Component Value Date     09/20/2021    K 4.6 09/20/2021     09/20/2021    CO2 26 09/20/2021    BUN 17 09/20/2021    CREATININE 1.33 09/20/2021    GLUCOSE 121 09/20/2021    CALCIUM 10.6 10/19/2021      PHOSPHORUS:  No results found for: PHOS  MAGNESIUM:   Lab Results   Component Value Date    MG 2.0 06/17/2021     ALBUMIN:   Lab Results   Component Value Date    LABALBU 4.9 03/18/2021     PTH: No components found for: PTHINTACT  VIT D3,25 HYDROXY: No results found for: VITD3     IRON:  No results found for: IRON  IRON SATURATION:  No results found for: LABIRON  TIBC:  No results found for: TIBC  FERRITIN:  No results found for: FERRITIN          ASHLEY: No results found for: ASHLEY    SPEP:   Lab Results   Component Value Date    PROT 7.6 06/17/2021    ALBCAL 5.3 06/17/2021    ALBPCT 70 06/17/2021    LABALPH 0.2 06/17/2021    LABALPH 0.8 06/17/2021    A1PCT 2 06/17/2021    A2PCT 11 06/17/2021    LABBETA 0.7 06/17/2021    BETAPCT 10 06/17/2021    GAMGLOB 0.6 06/17/2021    GGPCT 7 06/17/2021    PATH ELECTRONICALLY SIGNED. Connie Bland M.D. 06/17/2021     UPEP:   Lab Results   Component Value Date    TPU 11 06/17/2021      HEPBSAG:  Lab Results   Component Value Date    HEPBSAG NONREACTIVE 06/17/2021     HEPCAB:  Lab Results   Component Value Date    HEPCAB NONREACTIVE 06/17/2021     C3:   Lab Results   Component Value Date    C3 133 06/17/2021     C4:   Lab Results   Component Value Date    C4 42 (H) 06/17/2021     MPO ANCA: No results found for: MPO . PR3 ANCA:  No results found for: PR3                   URINALYSIS/URINE CHEMISTRIES      URINE CREATININE:    Lab Results   Component Value Date    LABCREA 105.9 12/03/2021     URINE EOSINOPHILS :   Lab Results   Component Value Date    UREO NEGATIVE 06/17/2021     URINE PROTEIN:    Lab Results   Component Value Date    TPU 11 06/17/2021           RADIOLOGY    Results for orders placed during the hospital encounter of 06/15/21    US RENAL COMPLETE    Narrative  EXAMINATION:  RETROPERITONEAL ULTRASOUND OF THE KIDNEYS AND URINARY BLADDER    6/15/2021    COMPARISON:  None    HISTORY:  ORDERING SYSTEM PROVIDED HISTORY: Stage 3b chronic kidney disease ([address]  TECHNOLOGIST PROVIDED HISTORY: Please check post void residual  For cortical thickness, renal size and corticomedullary differentiation. Reason for Exam: CKD  Acuity: Acute  Type of Exam: Initial    FINDINGS:    Kidneys:     The right kidney measures 9.4 cm in length and the left kidney measures 9.8  cm in length. Kidneys demonstrate normal cortical echogenicity. Cortical thickness is 9 mm  in the right and 12 mm on the left. Mild right pelviectasis. No discrete  intrarenal stones. Bladder:    Bladder is partially distended without evidence of focal abnormality or  significant wall thickening. Bilateral jets are seen. Impression  Mild right pelviectasis, otherwise negative renal and bladder ultrasound. ASSESSMENT     1. CKD 3 likely due to diabetic and hypertensive nephrosclerosis. Patient's creatinine seems to be running around 1.2 to 1.7 mg/DL, she likely had an ALBA with creatinine going as high as 3.09 mg/DL in 4/2021-not improved. ALBA was likely has pre-renal factors along with possible effects of the diet pill and her meds. Serology 6/17/2021: SPEP, immunofixation, complement levels and hepatitis panel all negative. K/L 1.39, UA negative for protein and RBC. UPC 0.09. Renal ultrasound 6/15/2021: Right kidney 9.4 cm left kidney 9.8 cm. Impression: Mild right pelviectasis, otherwise negative renal and bladder ultrasound. 2. Hypertension since 8-10 years ago. BP Readings from Last 3 Encounters:   12/06/21 132/74   10/22/21 128/84   06/07/21 112/76    :  3. Diabetes mellitus since 8-10 years ago. 4. +ve tobacco abuse. 5.  Mild right pelviectasis, otherwise negative renal and bladder ultrasound- per renal US 6/2021. PLAN     1. Based on available labs patients renal function is stable. Patient's volume status is  acceptable. Importance of tight blood pressure, glycemic control, lipid control was outlined to patient. 2.  Keep well-hydrated. 3.  Continue current medications. 4.  Cont to follow up with Endo. 5.  Counseled was provided about stopping smoking. 5.  Keep low-salt diet. 6.  Try to keep off of diet pills and herbal meds. 7.  Follow up labs ordered : bmp, cbc, phos, intact pth, vitaminD 25 OH.   8. Urine for random protein and creat to be done.  9. Will repeat renal US in about 6 months due to h/o Mild right pelviectasis. 10. Follow up in the office in 4-5 months. Patient was asked to avoid NSAIDS. Please do not hesitate to call with questions. Electronically signed by Jewel High MD on 12/6/2021 at 10:24 AM  [facility]     This note is created with the assistance of a speech-recognition program. While intending to generate a document that actually reflects the content of the visit, no guarantees can be provided that every mistake has been identified and corrected by editing.

## 2022-01-04 ENCOUNTER — HOSPITAL ENCOUNTER (OUTPATIENT)
Dept: LAB | Age: 62
Discharge: HOME | End: 2022-01-04
Payer: COMMERCIAL

## 2022-01-04 DIAGNOSIS — N18.32 STAGE 3B CHRONIC KIDNEY DISEASE (HCC): ICD-10-CM

## 2022-01-04 LAB
ABSOLUTE EOS #: 0.24 K/UL (ref 0–0.44)
ABSOLUTE IMMATURE GRANULOCYTE: 0.03 K/UL (ref 0–0.3)
ABSOLUTE LYMPH #: 2.21 K/UL (ref 1.1–3.7)
ABSOLUTE MONO #: 0.62 K/UL (ref 0.1–1.2)
ANION GAP SERPL CALCULATED.3IONS-SCNC: 10 MMOL/L (ref 9–17)
BASOPHILS # BLD: 1 % (ref 0–2)
BASOPHILS ABSOLUTE: 0.06 K/UL (ref 0–0.2)
BUN BLDV-MCNC: 25 MG/DL (ref 8–23)
BUN/CREAT BLD: 19 (ref 9–20)
CALCIUM SERPL-MCNC: 8 MG/DL (ref 8.6–10.4)
CHLORIDE BLD-SCNC: 106 MMOL/L (ref 98–107)
CO2: 26 MMOL/L (ref 20–31)
CREAT SERPL-MCNC: 1.34 MG/DL (ref 0.5–0.9)
CREATININE URINE: 76.5 MG/DL (ref 28–217)
DIFFERENTIAL TYPE: ABNORMAL
EOSINOPHILS RELATIVE PERCENT: 3 % (ref 1–4)
GFR AFRICAN AMERICAN: 49 ML/MIN
GFR NON-AFRICAN AMERICAN: 40 ML/MIN
GFR SERPL CREATININE-BSD FRML MDRD: ABNORMAL ML/MIN/{1.73_M2}
GFR SERPL CREATININE-BSD FRML MDRD: ABNORMAL ML/MIN/{1.73_M2}
GLUCOSE BLD-MCNC: 119 MG/DL (ref 70–99)
HCT VFR BLD CALC: 36.7 % (ref 36.3–47.1)
HEMOGLOBIN: 12.1 G/DL (ref 11.9–15.1)
IMMATURE GRANULOCYTES: 0 %
LYMPHOCYTES # BLD: 27 % (ref 24–43)
MCH RBC QN AUTO: 32.7 PG (ref 25.2–33.5)
MCHC RBC AUTO-ENTMCNC: 33 G/DL (ref 25.2–33.5)
MCV RBC AUTO: 99.2 FL (ref 82.6–102.9)
MONOCYTES # BLD: 8 % (ref 3–12)
NRBC AUTOMATED: 0 PER 100 WBC
PDW BLD-RTO: 12 % (ref 11.8–14.4)
PHOSPHORUS: 2.3 MG/DL (ref 2.6–4.5)
PLATELET # BLD: 286 K/UL (ref 138–453)
PLATELET ESTIMATE: ABNORMAL
PMV BLD AUTO: 9.9 FL (ref 8.1–13.5)
POTASSIUM SERPL-SCNC: 4.9 MMOL/L (ref 3.7–5.3)
RBC # BLD: 3.7 M/UL (ref 3.95–5.11)
RBC # BLD: ABNORMAL 10*6/UL
SEG NEUTROPHILS: 61 % (ref 36–65)
SEGMENTED NEUTROPHILS ABSOLUTE COUNT: 5.16 K/UL (ref 1.5–8.1)
SODIUM BLD-SCNC: 142 MMOL/L (ref 135–144)
TOTAL PROTEIN, URINE: 10 MG/DL
URINE TOTAL PROTEIN CREATININE RATIO: 0.13 (ref 0–0.2)
VITAMIN D 25-HYDROXY: 44.8 NG/ML (ref 30–100)
WBC # BLD: 8.3 K/UL (ref 3.5–11.3)
WBC # BLD: ABNORMAL 10*3/UL

## 2022-01-04 PROCEDURE — 83970 ASSAY OF PARATHORMONE: CPT

## 2022-01-04 PROCEDURE — 86225 DNA ANTIBODY NATIVE: CPT

## 2022-01-04 PROCEDURE — 82330 ASSAY OF CALCIUM: CPT

## 2022-01-04 PROCEDURE — 84100 ASSAY OF PHOSPHORUS: CPT

## 2022-01-04 PROCEDURE — 82570 ASSAY OF URINE CREATININE: CPT

## 2022-01-04 PROCEDURE — 84156 ASSAY OF PROTEIN URINE: CPT

## 2022-01-04 PROCEDURE — 86038 ANTINUCLEAR ANTIBODIES: CPT

## 2022-01-04 PROCEDURE — 36415 COLL VENOUS BLD VENIPUNCTURE: CPT

## 2022-01-04 PROCEDURE — 80048 BASIC METABOLIC PNL TOTAL CA: CPT

## 2022-01-04 PROCEDURE — 85025 COMPLETE CBC W/AUTO DIFF WBC: CPT

## 2022-01-04 PROCEDURE — 82306 VITAMIN D 25 HYDROXY: CPT

## 2022-01-05 LAB
ANTI DNA DOUBLE STRANDED: <0.5 IU/ML
ANTI-NUCLEAR ANTIBODY (ANA): NEGATIVE
CALCIUM IONIZED: 0.99 MMOL/L (ref 1.13–1.33)
ENA ANTIBODIES SCREEN: 0.2 U/ML
PTH INTACT: 22.65 PG/ML (ref 15–65)

## 2022-01-10 ENCOUNTER — HOSPITAL ENCOUNTER (OUTPATIENT)
Dept: LAB | Age: 62
Discharge: HOME | End: 2022-01-10
Payer: COMMERCIAL

## 2022-01-10 LAB — CALCIUM SERPL-MCNC: 9.1 MG/DL (ref 8.6–10.4)

## 2022-01-10 PROCEDURE — 36415 COLL VENOUS BLD VENIPUNCTURE: CPT

## 2022-01-10 PROCEDURE — 83970 ASSAY OF PARATHORMONE: CPT

## 2022-01-10 PROCEDURE — 82523 COLLAGEN CROSSLINKS: CPT

## 2022-01-10 PROCEDURE — 82310 ASSAY OF CALCIUM: CPT

## 2022-01-10 PROCEDURE — 82330 ASSAY OF CALCIUM: CPT

## 2022-01-11 LAB
CALCIUM IONIZED: ABNORMAL MMOL/L (ref 1.13–1.33)
PTH INTACT: 14.09 PG/ML (ref 15–65)

## 2022-01-17 LAB
MISCELLANEOUS LAB TEST RESULT: NORMAL
TEST NAME: NORMAL

## 2022-02-09 ENCOUNTER — TELEPHONE (OUTPATIENT)
Dept: NEPHROLOGY | Age: 62
End: 2022-02-09

## 2022-02-09 NOTE — TELEPHONE ENCOUNTER
Left message letting patient know she needs to repeat labwork before her 5/2/22 appt. Standing orders already placed by Dr. Elis Quintanilla.

## 2022-02-09 NOTE — TELEPHONE ENCOUNTER
Patient called the office. Patient is scheduled to see Dr Savannah Theodore  5/2/22 with labs before the appointment. Patient had other lab work due for another physician in January. Patient went ahead and had Dr Loyd Jones labs drawn at that time. Patient is asking if that was too early. Patient is asking if new orders need to be placed?  Please call the patient back @ # [phone]

## 2022-03-10 ENCOUNTER — HOSPITAL ENCOUNTER (OUTPATIENT)
Dept: MAMMOGRAPHY | Age: 62
Discharge: HOME | End: 2022-03-12
Payer: COMMERCIAL

## 2022-03-10 ENCOUNTER — HOSPITAL ENCOUNTER (OUTPATIENT)
Dept: LAB | Age: 62
Discharge: HOME | End: 2022-03-10
Payer: COMMERCIAL

## 2022-03-10 DIAGNOSIS — Z12.31 OTHER SCREENING MAMMOGRAM: ICD-10-CM

## 2022-03-10 LAB
CALCIUM SERPL-MCNC: 9.5 MG/DL (ref 8.6–10.4)
PTH INTACT: 26.35 PG/ML (ref 15–65)

## 2022-03-10 PROCEDURE — 82310 ASSAY OF CALCIUM: CPT

## 2022-03-10 PROCEDURE — 83970 ASSAY OF PARATHORMONE: CPT

## 2022-03-10 PROCEDURE — 36415 COLL VENOUS BLD VENIPUNCTURE: CPT

## 2022-03-10 PROCEDURE — 77067 SCR MAMMO BI INCL CAD: CPT

## 2022-03-10 PROCEDURE — 82523 COLLAGEN CROSSLINKS: CPT

## 2022-03-15 ENCOUNTER — TELEPHONE (OUTPATIENT)
Dept: FAMILY MEDICINE CLINIC | Age: 62
End: 2022-03-15

## 2022-03-16 LAB
MISCELLANEOUS LAB TEST RESULT: NORMAL
TEST NAME: NORMAL

## 2022-04-01 DIAGNOSIS — I10 ESSENTIAL HYPERTENSION: ICD-10-CM

## 2022-04-01 RX ORDER — HYDROCHLOROTHIAZIDE 12.5 MG/1
CAPSULE, GELATIN COATED ORAL
Qty: 90 CAPSULE | Refills: 3 | Status: SHIPPED | OUTPATIENT
Start: 2022-04-01

## 2022-04-01 NOTE — TELEPHONE ENCOUNTER
Guera Duncan called requesting a refill of the below medication which has been pended for you:     Requested Prescriptions     Pending Prescriptions Disp Refills    hydroCHLOROthiazide (MICROZIDE) 12.5 MG capsule [Pharmacy Med Name: HYDROCHLOROTHIAZIDE CAPS 12.5MG] 90 capsule 3     Sig: TAKE 1 CAPSULE EVERY MORNING       Last Appointment Date: 10/22/2021  Next Appointment Date: 4/22/2022    Allergies   Allergen Reactions    Gabapentin Anaphylaxis    Lyrica [Pregabalin]     Wellbutrin [Bupropion] Other (See Comments)     Seeing double    Ciprofloxacin Other (See Comments)     Stomach pain    Keflex [Cephalexin] Other (See Comments)     Stomach pain

## 2022-04-14 ENCOUNTER — HOSPITAL ENCOUNTER (OUTPATIENT)
Dept: LAB | Age: 62
Discharge: HOME | End: 2022-04-14
Payer: COMMERCIAL

## 2022-04-14 DIAGNOSIS — E11.9 TYPE 2 DIABETES MELLITUS WITHOUT COMPLICATION, UNSPECIFIED WHETHER LONG TERM INSULIN USE (HCC): ICD-10-CM

## 2022-04-14 DIAGNOSIS — E78.2 MIXED HYPERLIPIDEMIA: ICD-10-CM

## 2022-04-14 DIAGNOSIS — N18.32 STAGE 3B CHRONIC KIDNEY DISEASE (HCC): ICD-10-CM

## 2022-04-14 LAB
ABSOLUTE EOS #: 0.16 K/UL (ref 0–0.44)
ABSOLUTE IMMATURE GRANULOCYTE: <0.03 K/UL (ref 0–0.3)
ABSOLUTE LYMPH #: 1.76 K/UL (ref 1.1–3.7)
ABSOLUTE MONO #: 0.8 K/UL (ref 0.1–1.2)
ALBUMIN SERPL-MCNC: 5.3 G/DL (ref 3.5–5.2)
ALBUMIN/GLOBULIN RATIO: 2 (ref 1–2.5)
ALP BLD-CCNC: 112 U/L (ref 35–104)
ALT SERPL-CCNC: 18 U/L (ref 5–33)
ANION GAP SERPL CALCULATED.3IONS-SCNC: 13 MMOL/L (ref 9–17)
ANION GAP SERPL CALCULATED.3IONS-SCNC: 14 MMOL/L (ref 9–17)
AST SERPL-CCNC: 29 U/L
BASOPHILS # BLD: 1 % (ref 0–2)
BASOPHILS ABSOLUTE: 0.05 K/UL (ref 0–0.2)
BILIRUB SERPL-MCNC: 0.44 MG/DL (ref 0.3–1.2)
BUN BLDV-MCNC: 19 MG/DL (ref 8–23)
BUN BLDV-MCNC: 20 MG/DL (ref 8–23)
BUN/CREAT BLD: 15 (ref 9–20)
BUN/CREAT BLD: 16 (ref 9–20)
CALCIUM IONIZED: 1.25 MMOL/L (ref 1.13–1.33)
CALCIUM SERPL-MCNC: 10.1 MG/DL (ref 8.6–10.4)
CALCIUM SERPL-MCNC: 9.9 MG/DL (ref 8.6–10.4)
CHLORIDE BLD-SCNC: 103 MMOL/L (ref 98–107)
CHLORIDE BLD-SCNC: 103 MMOL/L (ref 98–107)
CHOLESTEROL/HDL RATIO: 2.6
CHOLESTEROL: 197 MG/DL
CO2: 28 MMOL/L (ref 20–31)
CO2: 28 MMOL/L (ref 20–31)
CREAT SERPL-MCNC: 1.22 MG/DL (ref 0.5–0.9)
CREAT SERPL-MCNC: 1.24 MG/DL (ref 0.5–0.9)
CREATININE URINE: 155.7 MG/DL (ref 28–217)
EOSINOPHILS RELATIVE PERCENT: 2 % (ref 1–4)
ESTIMATED AVERAGE GLUCOSE: 114 MG/DL
GFR AFRICAN AMERICAN: 53 ML/MIN
GFR AFRICAN AMERICAN: 54 ML/MIN
GFR NON-AFRICAN AMERICAN: 44 ML/MIN
GFR NON-AFRICAN AMERICAN: 45 ML/MIN
GFR SERPL CREATININE-BSD FRML MDRD: ABNORMAL ML/MIN/{1.73_M2}
GFR SERPL CREATININE-BSD FRML MDRD: ABNORMAL ML/MIN/{1.73_M2}
GLUCOSE BLD-MCNC: 111 MG/DL (ref 70–99)
GLUCOSE BLD-MCNC: 111 MG/DL (ref 70–99)
HBA1C MFR BLD: 5.6 % (ref 4–6)
HCT VFR BLD CALC: 37.3 % (ref 36.3–47.1)
HDLC SERPL-MCNC: 75 MG/DL
HEMOGLOBIN: 12.5 G/DL (ref 11.9–15.1)
IMMATURE GRANULOCYTES: 0 %
LDL CHOLESTEROL: 101 MG/DL (ref 0–130)
LYMPHOCYTES # BLD: 21 % (ref 24–43)
MCH RBC QN AUTO: 32.5 PG (ref 25.2–33.5)
MCHC RBC AUTO-ENTMCNC: 33.5 G/DL (ref 25.2–33.5)
MCV RBC AUTO: 96.9 FL (ref 82.6–102.9)
MONOCYTES # BLD: 10 % (ref 3–12)
NRBC AUTOMATED: 0 PER 100 WBC
PDW BLD-RTO: 13.2 % (ref 11.8–14.4)
PHOSPHORUS: 3.9 MG/DL (ref 2.6–4.5)
PLATELET # BLD: 282 K/UL (ref 138–453)
PMV BLD AUTO: 10.5 FL (ref 8.1–13.5)
POTASSIUM SERPL-SCNC: 3.3 MMOL/L (ref 3.7–5.3)
POTASSIUM SERPL-SCNC: 3.4 MMOL/L (ref 3.7–5.3)
PTH INTACT: 13.21 PG/ML (ref 15–65)
RBC # BLD: 3.85 M/UL (ref 3.95–5.11)
SEG NEUTROPHILS: 66 % (ref 36–65)
SEGMENTED NEUTROPHILS ABSOLUTE COUNT: 5.46 K/UL (ref 1.5–8.1)
SODIUM BLD-SCNC: 144 MMOL/L (ref 135–144)
SODIUM BLD-SCNC: 145 MMOL/L (ref 135–144)
TOTAL PROTEIN, URINE: 12 MG/DL
TOTAL PROTEIN: 8 G/DL (ref 6.4–8.3)
TRIGL SERPL-MCNC: 103 MG/DL
URINE TOTAL PROTEIN CREATININE RATIO: 0.08 (ref 0–0.2)
VITAMIN D 25-HYDROXY: 50.1 NG/ML
WBC # BLD: 8.3 K/UL (ref 3.5–11.3)

## 2022-04-14 PROCEDURE — 82570 ASSAY OF URINE CREATININE: CPT

## 2022-04-14 PROCEDURE — 82330 ASSAY OF CALCIUM: CPT

## 2022-04-14 PROCEDURE — 84100 ASSAY OF PHOSPHORUS: CPT

## 2022-04-14 PROCEDURE — 83970 ASSAY OF PARATHORMONE: CPT

## 2022-04-14 PROCEDURE — 80053 COMPREHEN METABOLIC PANEL: CPT

## 2022-04-14 PROCEDURE — 82306 VITAMIN D 25 HYDROXY: CPT

## 2022-04-14 PROCEDURE — 36415 COLL VENOUS BLD VENIPUNCTURE: CPT

## 2022-04-14 PROCEDURE — 84156 ASSAY OF PROTEIN URINE: CPT

## 2022-04-14 PROCEDURE — 80048 BASIC METABOLIC PNL TOTAL CA: CPT

## 2022-04-14 PROCEDURE — 85025 COMPLETE CBC W/AUTO DIFF WBC: CPT

## 2022-04-14 PROCEDURE — 80061 LIPID PANEL: CPT

## 2022-04-14 PROCEDURE — 83036 HEMOGLOBIN GLYCOSYLATED A1C: CPT

## 2022-05-04 ENCOUNTER — TELEPHONE (OUTPATIENT)
Dept: SURGERY | Age: 62
End: 2022-05-04

## 2022-05-04 ENCOUNTER — OFFICE VISIT (OUTPATIENT)
Dept: FAMILY MEDICINE CLINIC | Age: 62
End: 2022-05-04
Payer: COMMERCIAL

## 2022-05-04 ENCOUNTER — HOSPITAL ENCOUNTER (OUTPATIENT)
Dept: LAB | Age: 62
Discharge: HOME | End: 2022-05-04
Payer: COMMERCIAL

## 2022-05-04 VITALS
RESPIRATION RATE: 18 BRPM | SYSTOLIC BLOOD PRESSURE: 106 MMHG | DIASTOLIC BLOOD PRESSURE: 84 MMHG | HEART RATE: 99 BPM | BODY MASS INDEX: 24.91 KG/M2 | HEIGHT: 66 IN | WEIGHT: 155 LBS | OXYGEN SATURATION: 99 %

## 2022-05-04 DIAGNOSIS — E78.2 MIXED HYPERLIPIDEMIA: ICD-10-CM

## 2022-05-04 DIAGNOSIS — E11.9 TYPE 2 DIABETES MELLITUS WITHOUT COMPLICATION, UNSPECIFIED WHETHER LONG TERM INSULIN USE (HCC): ICD-10-CM

## 2022-05-04 DIAGNOSIS — E11.9 TYPE 2 DIABETES MELLITUS WITHOUT COMPLICATION, UNSPECIFIED WHETHER LONG TERM INSULIN USE (HCC): Primary | ICD-10-CM

## 2022-05-04 DIAGNOSIS — I10 ESSENTIAL HYPERTENSION: ICD-10-CM

## 2022-05-04 DIAGNOSIS — Z12.11 COLON CANCER SCREENING: ICD-10-CM

## 2022-05-04 LAB
ANION GAP SERPL CALCULATED.3IONS-SCNC: 15 MMOL/L (ref 9–17)
BUN BLDV-MCNC: 15 MG/DL (ref 8–23)
BUN/CREAT BLD: 12 (ref 9–20)
CALCIUM SERPL-MCNC: 10.7 MG/DL (ref 8.6–10.4)
CHLORIDE BLD-SCNC: 101 MMOL/L (ref 98–107)
CO2: 30 MMOL/L (ref 20–31)
CREAT SERPL-MCNC: 1.28 MG/DL (ref 0.5–0.9)
CREATININE URINE: 144.6 MG/DL (ref 28–217)
ESTIMATED AVERAGE GLUCOSE: 108 MG/DL
GFR AFRICAN AMERICAN: 52 ML/MIN
GFR NON-AFRICAN AMERICAN: 43 ML/MIN
GFR SERPL CREATININE-BSD FRML MDRD: ABNORMAL ML/MIN/{1.73_M2}
GLUCOSE BLD-MCNC: 118 MG/DL (ref 70–99)
HBA1C MFR BLD: 5.4 % (ref 4–6)
MICROALBUMIN/CREAT 24H UR: <12 MG/L
MICROALBUMIN/CREAT UR-RTO: NORMAL MCG/MG CREAT
POTASSIUM SERPL-SCNC: 4.4 MMOL/L (ref 3.7–5.3)
SODIUM BLD-SCNC: 146 MMOL/L (ref 135–144)

## 2022-05-04 PROCEDURE — 99214 OFFICE O/P EST MOD 30 MIN: CPT | Performed by: NURSE PRACTITIONER

## 2022-05-04 PROCEDURE — 3044F HG A1C LEVEL LT 7.0%: CPT | Performed by: NURSE PRACTITIONER

## 2022-05-04 PROCEDURE — 83036 HEMOGLOBIN GLYCOSYLATED A1C: CPT

## 2022-05-04 PROCEDURE — 36415 COLL VENOUS BLD VENIPUNCTURE: CPT

## 2022-05-04 PROCEDURE — 82570 ASSAY OF URINE CREATININE: CPT

## 2022-05-04 PROCEDURE — 80048 BASIC METABOLIC PNL TOTAL CA: CPT

## 2022-05-04 PROCEDURE — 82043 UR ALBUMIN QUANTITATIVE: CPT

## 2022-05-04 RX ORDER — CINACALCET 30 MG/1
TABLET, FILM COATED ORAL
COMMUNITY
Start: 2022-01-12

## 2022-05-04 RX ORDER — ALENDRONATE SODIUM 70 MG/1
TABLET ORAL
COMMUNITY
Start: 2022-01-04

## 2022-05-04 ASSESSMENT — PATIENT HEALTH QUESTIONNAIRE - PHQ9
SUM OF ALL RESPONSES TO PHQ QUESTIONS 1-9: 0
1. LITTLE INTEREST OR PLEASURE IN DOING THINGS: 0
2. FEELING DOWN, DEPRESSED OR HOPELESS: 0
SUM OF ALL RESPONSES TO PHQ9 QUESTIONS 1 & 2: 0
SUM OF ALL RESPONSES TO PHQ QUESTIONS 1-9: 0

## 2022-05-04 ASSESSMENT — ENCOUNTER SYMPTOMS
CONSTIPATION: 0
SHORTNESS OF BREATH: 0
BLOOD IN STOOL: 0
BLURRED VISION: 0
DIARRHEA: 0
ABDOMINAL PAIN: 0

## 2022-05-04 NOTE — PROGRESS NOTES
Subjective:      Patient ID: Shailesh Ren is a 63 y.o. female coming in for   Chief Complaint   Patient presents with    6 Month Follow-Up    Discuss Labs     discuss lab work from 4/14/22      6 mo f/u for DM, HTN, hyperlipidemia. Reviewed most recent labs with patient. DM- A1c 5.6%, not sure if pt is in fact diabetic, reviewing labs back to Dec 2018 she has never had an A1c greater than 6.3%. pt states she was previously on bydureon once weekly, but has not taken it in at least a year. Denies polyuria/dipsia. Pt does follow with nephrology for CKD 3. Hyperlipidemia, lipid panel looks great, continue with lipitor. Hypertension  This is a chronic problem. The current episode started more than 1 year ago. The problem is unchanged. The problem is controlled. Pertinent negatives include no anxiety, blurred vision, chest pain, headaches, malaise/fatigue, palpitations, peripheral edema or shortness of breath. There are no associated agents to hypertension. Risk factors for coronary artery disease include dyslipidemia and smoking/tobacco exposure. Past treatments include ACE inhibitors (lisinopril 40mg daily). The current treatment provides significant improvement. There are no compliance problems. Hypertensive end-organ damage includes kidney disease. Review of Systems   Constitutional: Negative for fatigue, malaise/fatigue and unexpected weight change. Eyes: Negative for blurred vision. Respiratory: Negative for shortness of breath. Cardiovascular: Negative for chest pain, palpitations and leg swelling. Gastrointestinal: Negative for abdominal pain, blood in stool, constipation and diarrhea. Skin: Negative for rash. Neurological: Negative for dizziness, light-headedness and headaches. All other systems reviewed and are negative.        Objective:  /84   Pulse 99   Resp 18   Ht 5' 6\" (1.676 m)   Wt 155 lb (70.3 kg)   LMP 08/28/2016   SpO2 99%   BMI 25.02 kg/m²      Physical Exam  Vitals and nursing note reviewed. Constitutional:       General: She is not in acute distress. Appearance: Normal appearance. She is not ill-appearing. HENT:      Head: Normocephalic. Cardiovascular:      Rate and Rhythm: Normal rate and regular rhythm. Heart sounds: Normal heart sounds. Pulmonary:      Effort: Pulmonary effort is normal.      Breath sounds: Normal breath sounds. Musculoskeletal:      Right lower leg: No edema. Left lower leg: No edema. Skin:     General: Skin is warm and dry. Findings: No rash. Neurological:      General: No focal deficit present. Mental Status: She is alert and oriented to person, place, and time. Assessment:      1. Type 2 diabetes mellitus without complication, unspecified whether long term insulin use ([facility] [address]    2. Essential hypertension    3. Mixed hyperlipidemia    4.  Colon cancer screening           Plan:   -unsure if pt is truly diabetic, will repeat A1c at next f/u appt  -HTN stable, continue with lisinopril 40mg daily  -lipid panel looks great, continue with lipitor  -pt due for 5 year colonoscopy this year, referral placed  -f/u with me in 6 months      Orders Placed This Encounter   Procedures    Basic Metabolic Panel     Standing Status:   Future     Number of Occurrences:   1     Standing Expiration Date:   5/4/2023    Hemoglobin A1C     Standing Status:   Future     Number of Occurrences:   1     Standing Expiration Date:   5/4/2023   Ash Vargas     Standing Status:   Future     Standing Expiration Date:   5/4/2023   Bere Gibson MD, General Surgery, [place]     Referral Priority:   Routine     Referral Type:   Eval and Treat     Referral Reason:   Specialty Services Required     Referred to Provider:   Bonny Gitelman, MD     Requested Specialty:   General Surgery     Number of Visits Requested:   1      Outpatient Encounter Medications as of 5/4/2022   Medication Sig Dispense Refill    alendronate (FOSAMAX) 70 MG tablet       cinacalcet (SENSIPAR) 30 MG tablet       hydroCHLOROthiazide (MICROZIDE) 12.5 MG capsule TAKE 1 CAPSULE EVERY MORNING 90 capsule 3    lisinopril (PRINIVIL;ZESTRIL) 40 MG tablet TAKE 1 TABLET DAILY 90 tablet 3    escitalopram (LEXAPRO) 10 MG tablet       atorvastatin (LIPITOR) 20 MG tablet Take 1 tablet by mouth daily 90 tablet 1    omeprazole (PRILOSEC) 20 MG delayed release capsule Take 1 capsule by mouth daily 90 capsule 1    Blood Pressure KIT Check blood pressure daily and as needed E11.9 (hypertension) 1 kit 0    Blood Pressure Monitor CARY Monitor BP daily; Dx. Hypertension (I10) 1 Device 0    Insulin Pen Needle ([employer] PEN NEEDLES) 31G X 6 MM MISC 1 each by Does not apply route daily 100 each 3    Glucose Blood (BLOOD GLUCOSE TEST STRIPS) STRP Diagnosis 250.90, tests once daily 50 strip 11    Lancets MISC Diagnosis 250.90, tests once daily 50 each 11    lamoTRIgine (LAMICTAL) 200 MG tablet Take 200 mg by mouth daily      Blood Glucose Monitoring Suppl CARY Diagnosis 250.90, tests once daily 1 Device 0    Cholecalciferol (VITAMIN D PO) Take by mouth      SAPHRIS 10 MG SUBL sublingual tablet nightly       Multiple Vitamins-Minerals (MULTIVITAMIN PO) Take  by mouth daily.  [DISCONTINUED] amoxicillin (AMOXIL) 500 MG capsule take 1 capsule by mouth three times a day for 7 days (Patient not taking: Reported on 5/4/2022)      [DISCONTINUED] Exenatide (BYDUREON) 2 MG PEN Inject 1 pen into the skin once a week 5 pen 3     No facility-administered encounter medications on file as of 5/4/2022.             Karin Bojorquez, APRN - CNP

## 2022-05-04 NOTE — TELEPHONE ENCOUNTER
Writer received call from Mike in [facility]. Patient is due for screening colonoscopy, no symptoms. Patient requested Dr. Cara Ortega, colonoscopy paperwork mailed.

## 2022-05-25 ENCOUNTER — TELEPHONE (OUTPATIENT)
Dept: SURGERY | Age: 62
End: 2022-05-25

## 2022-05-25 NOTE — TELEPHONE ENCOUNTER
Received paperwork in the mail for patient to schedule 5 year repeat colonoscopy. Phone call placed to patient. Procedure scheduled at [facility] on 9/13/22. Instructions mailed to patient. Advised patient to check insurance benefit for history of polyp.

## 2022-05-25 NOTE — TELEPHONE ENCOUNTER
H &P Colonoscopy  Patient:Julissa Phoenix                 :1960  (yes) patient has seen Dr. Melia Romano prior to procedure  PCP: Brenda HUFF    CC:History of colon polyp        HPI:        Colonoscopy  Abd pain: no  Anemia: no  Bloating:no  Diarrhea: no  Constipation: no  Melena: no  Hematochezia:no  Rectal Bleeding:no  Rectal/Anal Pain:no  Pruritus: no  Family history colon Cancer: no  Previous colon cancer: no  Previous Colon Polyp: yes  Change in bowels: no  Decrease caliber of stool: no  Change in color of stool: no    Previous work up date: 3/3/2017-colonoscopy=hyperplastic polyp X 1. Dr. Melia Romano at [facility]       Family History   Problem Relation Age of Onset    Diabetes Mother           general health deterioration after falling    Osteoporosis Mother     High Cholesterol Father     Lung Cancer Father          age 61    Diabetes Other     Diabetes Other     Diabetes Brother      Social History     Socioeconomic History    Marital status:      Spouse name: Not on file    Number of children: Not on file    Years of education: Not on file    Highest education level: Not on file   Occupational History    Not on file   Tobacco Use    Smoking status: Current Every Day Smoker     Packs/day: 0.25     Years: 40.00     Pack years: 10.00     Start date: 3/18/1975    Smokeless tobacco: Never Used   Vaping Use    Vaping Use: Never used   Substance and Sexual Activity    Alcohol use: Yes     Alcohol/week: 0.0 standard drinks     Comment: socially    Drug use: Yes     Types: Opiates , Marijuana (Weed)     Comment: Quit . Followed up at the recovery center.     Sexual activity: Yes     Partners: Male     Birth control/protection: Post-menopausal     Comment: Menarche age 17  Menopause age 49   Other Topics Concern    Not on file   Social History Narrative    Not on file     Social Determinants of Health     Financial Resource Strain: Low Risk     Difficulty of Paying Living Expenses: Not very hard   Food Insecurity: No Food Insecurity    Worried About Running Out of Food in the Last Year: Never true    Jose of Food in the Last Year: Never true   Transportation Needs:     Lack of Transportation (Medical): Not on file    Lack of Transportation (Non-Medical): Not on file   Physical Activity:     Days of Exercise per Week: Not on file    Minutes of Exercise per Session: Not on file   Stress:     Feeling of Stress : Not on file   Social Connections:     Frequency of Communication with Friends and Family: Not on file    Frequency of Social Gatherings with Friends and Family: Not on file    Attends Druze Services: Not on file    Active Member of [address] [place] [address] [employer] or Organizations: Not on file    Attends Club or Organization Meetings: Not on file    Marital Status: Not on file   Intimate Partner Violence:     Fear of Current or Ex-Partner: Not on file    Emotionally Abused: Not on file    Physically Abused: Not on file    Sexually Abused: Not on file   Housing Stability:     Unable to Pay for Housing in the Last Year: Not on file    Number of [place] in the Last Year: Not on file    Unstable Housing in the Last Year: Not on file     Past Surgical History:   Procedure Laterality Date    CATARACT REMOVAL Bilateral     CATARACT REMOVAL WITH IMPLANT Left 3/22/2016    Dr. Amy Jama, [address] COLONOSCOPY  03/03/2017    Dr. Elton Clark, [facility]  03/10/1987    Laser conization of cervix (Hx of moderate dysplasia on Pap test).  UPPER GASTROINTESTINAL ENDOSCOPY  05/02/2013    Mild gastritis.      Past Medical History:   Diagnosis Date    Cervicogenic headache     Chronic sinusitis     Conductive hearing loss     Degenerative arthritis of cervical spine     Depression     Elevated fasting glucose     Epigastric abdominal pain     GERD (gastroesophageal reflux disease)     History of cervical dysplasia 1987    LASER    History of methamphetamine abuse ([facility] [address]     (Quit 6-2001)    LSIL (low grade squamous intraepithelial lesion) on Pap smear 2002    [place]o    Tobacco abuse      Current Outpatient Medications on File Prior to Visit   Medication Sig Dispense Refill    alendronate (FOSAMAX) 70 MG tablet       cinacalcet (SENSIPAR) 30 MG tablet       hydroCHLOROthiazide (MICROZIDE) 12.5 MG capsule TAKE 1 CAPSULE EVERY MORNING 90 capsule 3    lisinopril (PRINIVIL;ZESTRIL) 40 MG tablet TAKE 1 TABLET DAILY 90 tablet 3    escitalopram (LEXAPRO) 10 MG tablet       atorvastatin (LIPITOR) 20 MG tablet Take 1 tablet by mouth daily 90 tablet 1    omeprazole (PRILOSEC) 20 MG delayed release capsule Take 1 capsule by mouth daily 90 capsule 1    Blood Pressure KIT Check blood pressure daily and as needed E11.9 (hypertension) 1 kit 0    Blood Pressure Monitor CARY Monitor BP daily; Dx. Hypertension (I10) 1 Device 0    Insulin Pen Needle ([employer] PEN NEEDLES) 31G X 6 MM MISC 1 each by Does not apply route daily 100 each 3    Glucose Blood (BLOOD GLUCOSE TEST STRIPS) STRP Diagnosis 250.90, tests once daily 50 strip 11    Lancets MISC Diagnosis 250.90, tests once daily 50 each 11    lamoTRIgine (LAMICTAL) 200 MG tablet Take 200 mg by mouth daily      Blood Glucose Monitoring Suppl CARY Diagnosis 250.90, tests once daily 1 Device 0    Cholecalciferol (VITAMIN D PO) Take by mouth      SAPHRIS 10 MG SUBL sublingual tablet nightly       Multiple Vitamins-Minerals (MULTIVITAMIN PO) Take  by mouth daily. No current facility-administered medications on file prior to visit.      Allergies as of 05/25/2022 - Fully Reviewed 05/04/2022   Allergen Reaction Noted    Gabapentin Anaphylaxis 03/21/2019    Lyrica [pregabalin]  04/30/2019    Wellbutrin [bupropion] Other (See Comments) 08/13/2020    Ciprofloxacin Other (See Comments) 05/15/2018    Keflex [cephalexin] Other (See Comments) 05/15/2018 PEx:                ASSESSMENT:        PLAN:Colonoscopy

## 2022-07-08 DIAGNOSIS — E78.2 MIXED HYPERLIPIDEMIA: ICD-10-CM

## 2022-07-08 RX ORDER — ATORVASTATIN CALCIUM 20 MG/1
20 TABLET, FILM COATED ORAL DAILY
Qty: 90 TABLET | Refills: 1 | Status: SHIPPED | OUTPATIENT
Start: 2022-07-08

## 2022-07-08 NOTE — TELEPHONE ENCOUNTER
Shayy Martinez called requesting a refill of the below medication which has been pended for you:     Requested Prescriptions     Pending Prescriptions Disp Refills    atorvastatin (LIPITOR) 20 MG tablet 90 tablet 1     Sig: Take 1 tablet by mouth daily       Last Appointment Date: 5/4/2022  Next Appointment Date: 11/4/2022    Allergies   Allergen Reactions    Gabapentin Anaphylaxis    Lyrica [Pregabalin]     Wellbutrin [Bupropion] Other (See Comments)     Seeing double    Ciprofloxacin Other (See Comments)     Stomach pain    Keflex [Cephalexin] Other (See Comments)     Stomach pain

## 2022-08-25 ENCOUNTER — TELEPHONE (OUTPATIENT)
Dept: FAMILY MEDICINE CLINIC | Age: 62
End: 2022-08-25

## 2022-08-25 NOTE — TELEPHONE ENCOUNTER
Pt states she is due for a deductible in September but pt's deductible is very high and she questions if she can do the Cologuard, please advise.

## 2022-08-29 NOTE — TELEPHONE ENCOUNTER
Pt notified of providers recommendations.  She will contact her insurance to make sure the Colonoscopy is coverec

## 2022-08-31 ENCOUNTER — TELEPHONE (OUTPATIENT)
Dept: SURGERY | Age: 62
End: 2022-08-31

## 2022-08-31 ENCOUNTER — OFFICE VISIT (OUTPATIENT)
Dept: FAMILY MEDICINE CLINIC | Age: 62
End: 2022-08-31
Payer: COMMERCIAL

## 2022-08-31 VITALS
OXYGEN SATURATION: 98 % | DIASTOLIC BLOOD PRESSURE: 72 MMHG | HEIGHT: 66 IN | WEIGHT: 157 LBS | RESPIRATION RATE: 16 BRPM | SYSTOLIC BLOOD PRESSURE: 128 MMHG | BODY MASS INDEX: 25.23 KG/M2 | HEART RATE: 92 BPM

## 2022-08-31 DIAGNOSIS — E11.9 TYPE 2 DIABETES MELLITUS WITHOUT COMPLICATION, UNSPECIFIED WHETHER LONG TERM INSULIN USE (HCC): ICD-10-CM

## 2022-08-31 DIAGNOSIS — M54.31 SCIATICA OF RIGHT SIDE: Primary | ICD-10-CM

## 2022-08-31 PROCEDURE — 99213 OFFICE O/P EST LOW 20 MIN: CPT | Performed by: NURSE PRACTITIONER

## 2022-08-31 PROCEDURE — 3044F HG A1C LEVEL LT 7.0%: CPT | Performed by: NURSE PRACTITIONER

## 2022-08-31 RX ORDER — PREDNISONE 20 MG/1
TABLET ORAL
Qty: 15 TABLET | Refills: 0 | Status: SHIPPED | OUTPATIENT
Start: 2022-08-31

## 2022-08-31 ASSESSMENT — PATIENT HEALTH QUESTIONNAIRE - PHQ9
SUM OF ALL RESPONSES TO PHQ QUESTIONS 1-9: 0
SUM OF ALL RESPONSES TO PHQ9 QUESTIONS 1 & 2: 0
1. LITTLE INTEREST OR PLEASURE IN DOING THINGS: 0
SUM OF ALL RESPONSES TO PHQ QUESTIONS 1-9: 0
2. FEELING DOWN, DEPRESSED OR HOPELESS: 0
8. MOVING OR SPEAKING SO SLOWLY THAT OTHER PEOPLE COULD HAVE NOTICED. OR THE OPPOSITE, BEING SO FIGETY OR RESTLESS THAT YOU HAVE BEEN MOVING AROUND A LOT MORE THAN USUAL: 0
6. FEELING BAD ABOUT YOURSELF - OR THAT YOU ARE A FAILURE OR HAVE LET YOURSELF OR YOUR FAMILY DOWN: 0
9. THOUGHTS THAT YOU WOULD BE BETTER OFF DEAD, OR OF HURTING YOURSELF: 0
5. POOR APPETITE OR OVEREATING: 0
3. TROUBLE FALLING OR STAYING ASLEEP: 0
7. TROUBLE CONCENTRATING ON THINGS, SUCH AS READING THE NEWSPAPER OR WATCHING TELEVISION: 0
10. IF YOU CHECKED OFF ANY PROBLEMS, HOW DIFFICULT HAVE THESE PROBLEMS MADE IT FOR YOU TO DO YOUR WORK, TAKE CARE OF THINGS AT HOME, OR GET ALONG WITH OTHER PEOPLE: 0
SUM OF ALL RESPONSES TO PHQ QUESTIONS 1-9: 0
4. FEELING TIRED OR HAVING LITTLE ENERGY: 0
SUM OF ALL RESPONSES TO PHQ QUESTIONS 1-9: 0

## 2022-08-31 NOTE — TELEPHONE ENCOUNTER
NADJAI -- Patient was at the front window today. Patient is scheduled for colonoscopy 9/13/22 with Dr Sushma Harper. Patient needs to post pone for now due to the cost.  She will call back when she wants to reschedule.

## 2022-08-31 NOTE — PROGRESS NOTES
Subjective:      Patient ID: Adelia Schneider is a 63 y.o. female coming in for   Chief Complaint   Patient presents with    Back Pain     Low back pain x 2 months. Right side. Thinks it is sciatic nerve pain. HPI  Right lower buttocks/posterior leg pain. Pain ongoing for two months. No trigger event. Pain worse with prolonged sitting. No saddle paresthesia, no loss of bladder/bowel control. Has not tried anything for pain. Review of Systems     Objective:/72   Pulse 92   Resp 16   Ht 5' 6\" (1.676 m)   Wt 157 lb (71.2 kg)   LMP 08/28/2016   SpO2 98%   BMI 25.34 kg/m²      Physical Exam  Vitals and nursing note reviewed. Constitutional:       Appearance: Normal appearance. HENT:      Head: Normocephalic. Pulmonary:      Effort: Pulmonary effort is normal.   Musculoskeletal:      Lumbar back: No swelling. Normal range of motion. Negative right straight leg raise test.   Skin:     General: Skin is warm and dry. Neurological:      General: No focal deficit present. Mental Status: She is alert and oriented to person, place, and time. Assessment:      1. Sciatica of right side    2. Type 2 diabetes mellitus without complication, unspecified whether long term insulin use (HCC)           Plan:    -prednisone for sciatica  -home stretches given  -f/u in Nov as previously scheduled. Orders Placed This Encounter   Procedures    Hemoglobin A1C     Standing Status:   Future     Standing Expiration Date:   8/31/2023      Outpatient Encounter Medications as of 8/31/2022   Medication Sig Dispense Refill    predniSONE (DELTASONE) 20 MG tablet One tab twice daily x 5 days, then one tab daily x 5 days.  15 tablet 0    atorvastatin (LIPITOR) 20 MG tablet Take 1 tablet by mouth daily 90 tablet 1    alendronate (FOSAMAX) 70 MG tablet       cinacalcet (SENSIPAR) 30 MG tablet       hydroCHLOROthiazide (MICROZIDE) 12.5 MG capsule TAKE 1 CAPSULE EVERY MORNING 90 capsule 3    lisinopril (PRINIVIL;ZESTRIL) 40 MG tablet TAKE 1 TABLET DAILY 90 tablet 3    escitalopram (LEXAPRO) 10 MG tablet       omeprazole (PRILOSEC) 20 MG delayed release capsule Take 1 capsule by mouth daily 90 capsule 1    Blood Pressure KIT Check blood pressure daily and as needed E11.9 (hypertension) 1 kit 0    Blood Pressure Monitor CARY Monitor BP daily; Dx. Hypertension (I10) 1 Device 0    Insulin Pen Needle ([employer] PEN NEEDLES) 31G X 6 MM MISC 1 each by Does not apply route daily 100 each 3    Glucose Blood (BLOOD GLUCOSE TEST STRIPS) STRP Diagnosis 250.90, tests once daily 50 strip 11    Lancets MISC Diagnosis 250.90, tests once daily 50 each 11    lamoTRIgine (LAMICTAL) 200 MG tablet Take 200 mg by mouth daily      Blood Glucose Monitoring Suppl CARY Diagnosis 250.90, tests once daily 1 Device 0    Cholecalciferol (VITAMIN D PO) Take by mouth      SAPHRIS 10 MG SUBL sublingual tablet nightly       Multiple Vitamins-Minerals (MULTIVITAMIN PO) Take  by mouth daily. No facility-administered encounter medications on file as of 8/31/2022.             Marilee Tatum, APRN - CNP

## 2022-09-19 ENCOUNTER — HOSPITAL ENCOUNTER (OUTPATIENT)
Dept: LAB | Age: 62
Discharge: HOME | End: 2022-09-19
Payer: COMMERCIAL

## 2022-09-19 LAB
CALCIUM IONIZED: 1.23 MMOL/L (ref 1.13–1.33)
CALCIUM SERPL-MCNC: 9.5 MG/DL (ref 8.6–10.4)
PTH INTACT: 17 PG/ML (ref 14–72)

## 2022-09-19 PROCEDURE — 82310 ASSAY OF CALCIUM: CPT

## 2022-09-19 PROCEDURE — 82330 ASSAY OF CALCIUM: CPT

## 2022-09-19 PROCEDURE — 83970 ASSAY OF PARATHORMONE: CPT

## 2022-09-19 PROCEDURE — 36415 COLL VENOUS BLD VENIPUNCTURE: CPT

## 2022-09-19 PROCEDURE — G0480 DRUG TEST DEF 1-7 CLASSES: HCPCS

## 2022-09-23 LAB
MISCELLANEOUS LAB TEST RESULT: NORMAL
TEST NAME: NORMAL

## 2022-11-02 DIAGNOSIS — I10 ESSENTIAL HYPERTENSION: ICD-10-CM

## 2022-11-03 ENCOUNTER — HOSPITAL ENCOUNTER (OUTPATIENT)
Dept: LAB | Age: 62
Discharge: HOME | End: 2022-11-03
Payer: COMMERCIAL

## 2022-11-03 DIAGNOSIS — E11.9 TYPE 2 DIABETES MELLITUS WITHOUT COMPLICATION, UNSPECIFIED WHETHER LONG TERM INSULIN USE (HCC): ICD-10-CM

## 2022-11-03 PROCEDURE — 83036 HEMOGLOBIN GLYCOSYLATED A1C: CPT

## 2022-11-03 PROCEDURE — 36415 COLL VENOUS BLD VENIPUNCTURE: CPT

## 2022-11-03 RX ORDER — LISINOPRIL 40 MG/1
TABLET ORAL
Qty: 90 TABLET | Refills: 0 | Status: SHIPPED | OUTPATIENT
Start: 2022-11-03

## 2022-11-03 NOTE — TELEPHONE ENCOUNTER
Jannette Colon called requesting a refill of the below medication which has been pended for you:     Requested Prescriptions     Pending Prescriptions Disp Refills    lisinopril (PRINIVIL;ZESTRIL) 40 MG tablet [Pharmacy Med Name: LISINOPRIL TABS 40MG] 90 tablet 0     Sig: TAKE 1 TABLET DAILY       Last Appointment Date: 8/31/22  Next Appointment Date: 11/9/22    Allergies   Allergen Reactions    Gabapentin Anaphylaxis    Lyrica [Pregabalin]     Wellbutrin [Bupropion] Other (See Comments)     Seeing double    Ciprofloxacin Other (See Comments)     Stomach pain    Keflex [Cephalexin] Other (See Comments)     Stomach pain

## 2022-11-04 LAB
ESTIMATED AVERAGE GLUCOSE: 126 MG/DL
HBA1C MFR BLD: 6 % (ref 4–6)

## 2022-11-09 ENCOUNTER — OFFICE VISIT (OUTPATIENT)
Dept: FAMILY MEDICINE CLINIC | Age: 62
End: 2022-11-09
Payer: COMMERCIAL

## 2022-11-09 VITALS
OXYGEN SATURATION: 100 % | SYSTOLIC BLOOD PRESSURE: 120 MMHG | WEIGHT: 164.4 LBS | HEART RATE: 105 BPM | BODY MASS INDEX: 26.53 KG/M2 | DIASTOLIC BLOOD PRESSURE: 80 MMHG

## 2022-11-09 DIAGNOSIS — M54.31 SCIATICA OF RIGHT SIDE: ICD-10-CM

## 2022-11-09 DIAGNOSIS — I10 ESSENTIAL HYPERTENSION: ICD-10-CM

## 2022-11-09 DIAGNOSIS — E11.9 TYPE 2 DIABETES MELLITUS WITHOUT COMPLICATION, UNSPECIFIED WHETHER LONG TERM INSULIN USE (HCC): Primary | ICD-10-CM

## 2022-11-09 DIAGNOSIS — Z23 NEED FOR SHINGLES VACCINE: ICD-10-CM

## 2022-11-09 DIAGNOSIS — Z23 NEED FOR PROPHYLACTIC VACCINATION AND INOCULATION AGAINST VARICELLA: ICD-10-CM

## 2022-11-09 PROBLEM — H43.812 POSTERIOR VITREOUS DETACHMENT OF LEFT EYE: Status: ACTIVE | Noted: 2022-03-14

## 2022-11-09 PROBLEM — H04.129 DRY EYE: Status: ACTIVE | Noted: 2022-03-14

## 2022-11-09 PROBLEM — R55 SYNCOPE AND COLLAPSE: Status: ACTIVE | Noted: 2022-10-14

## 2022-11-09 PROBLEM — R20.9 SKIN SENSATION DISTURBANCE: Status: ACTIVE | Noted: 2022-10-14

## 2022-11-09 PROBLEM — D31.32 NEVUS OF CHOROID OF LEFT EYE: Status: ACTIVE | Noted: 2022-03-14

## 2022-11-09 PROBLEM — H26.491 PCO (POSTERIOR CAPSULE OPACIFICATION), RIGHT: Status: ACTIVE | Noted: 2022-03-14

## 2022-11-09 PROCEDURE — 90750 HZV VACC RECOMBINANT IM: CPT | Performed by: NURSE PRACTITIONER

## 2022-11-09 PROCEDURE — 99214 OFFICE O/P EST MOD 30 MIN: CPT | Performed by: NURSE PRACTITIONER

## 2022-11-09 PROCEDURE — 90471 IMMUNIZATION ADMIN: CPT | Performed by: NURSE PRACTITIONER

## 2022-11-09 PROCEDURE — 3044F HG A1C LEVEL LT 7.0%: CPT | Performed by: NURSE PRACTITIONER

## 2022-11-09 PROCEDURE — 3078F DIAST BP <80 MM HG: CPT | Performed by: NURSE PRACTITIONER

## 2022-11-09 PROCEDURE — 3074F SYST BP LT 130 MM HG: CPT | Performed by: NURSE PRACTITIONER

## 2022-11-09 SDOH — ECONOMIC STABILITY: FOOD INSECURITY: WITHIN THE PAST 12 MONTHS, YOU WORRIED THAT YOUR FOOD WOULD RUN OUT BEFORE YOU GOT MONEY TO BUY MORE.: NEVER TRUE

## 2022-11-09 SDOH — ECONOMIC STABILITY: FOOD INSECURITY: WITHIN THE PAST 12 MONTHS, THE FOOD YOU BOUGHT JUST DIDN'T LAST AND YOU DIDN'T HAVE MONEY TO GET MORE.: NEVER TRUE

## 2022-11-09 ASSESSMENT — PATIENT HEALTH QUESTIONNAIRE - PHQ9
SUM OF ALL RESPONSES TO PHQ QUESTIONS 1-9: 0
6. FEELING BAD ABOUT YOURSELF - OR THAT YOU ARE A FAILURE OR HAVE LET YOURSELF OR YOUR FAMILY DOWN: 0
SUM OF ALL RESPONSES TO PHQ QUESTIONS 1-9: 0
7. TROUBLE CONCENTRATING ON THINGS, SUCH AS READING THE NEWSPAPER OR WATCHING TELEVISION: 0
9. THOUGHTS THAT YOU WOULD BE BETTER OFF DEAD, OR OF HURTING YOURSELF: 0
4. FEELING TIRED OR HAVING LITTLE ENERGY: 0
1. LITTLE INTEREST OR PLEASURE IN DOING THINGS: 0
SUM OF ALL RESPONSES TO PHQ QUESTIONS 1-9: 0
10. IF YOU CHECKED OFF ANY PROBLEMS, HOW DIFFICULT HAVE THESE PROBLEMS MADE IT FOR YOU TO DO YOUR WORK, TAKE CARE OF THINGS AT HOME, OR GET ALONG WITH OTHER PEOPLE: 0
SUM OF ALL RESPONSES TO PHQ QUESTIONS 1-9: 0
3. TROUBLE FALLING OR STAYING ASLEEP: 0
SUM OF ALL RESPONSES TO PHQ9 QUESTIONS 1 & 2: 0
2. FEELING DOWN, DEPRESSED OR HOPELESS: 0
5. POOR APPETITE OR OVEREATING: 0
8. MOVING OR SPEAKING SO SLOWLY THAT OTHER PEOPLE COULD HAVE NOTICED. OR THE OPPOSITE, BEING SO FIGETY OR RESTLESS THAT YOU HAVE BEEN MOVING AROUND A LOT MORE THAN USUAL: 0

## 2022-11-09 ASSESSMENT — SOCIAL DETERMINANTS OF HEALTH (SDOH): HOW HARD IS IT FOR YOU TO PAY FOR THE VERY BASICS LIKE FOOD, HOUSING, MEDICAL CARE, AND HEATING?: NOT HARD AT ALL

## 2022-11-09 ASSESSMENT — ENCOUNTER SYMPTOMS
SHORTNESS OF BREATH: 0
BLURRED VISION: 0

## 2022-11-09 NOTE — PROGRESS NOTES
Subjective:      Patient ID: Matt Espinoza is a 63 y.o. female coming in for   Chief Complaint   Patient presents with    Diabetes     A1c is 6     Back Pain     On and off and exeresis and stretches don't seem to help      Hypertension  This is a chronic problem. The current episode started more than 1 year ago. The problem is unchanged. The problem is controlled. Pertinent negatives include no anxiety, blurred vision, chest pain, headaches, malaise/fatigue, palpitations, peripheral edema or shortness of breath. There are no associated agents to hypertension. Risk factors for coronary artery disease include dyslipidemia and smoking/tobacco exposure. Past treatments include ACE inhibitors (lisinopril 40mg daily). The current treatment provides significant improvement. There are no compliance problems. Hypertensive end-organ damage includes kidney disease. Diabetes  She presents for her follow-up diabetic visit. She has type 2 diabetes mellitus. Her disease course has been stable (A1c 6%). There are no hypoglycemic associated symptoms. Pertinent negatives for hypoglycemia include no headaches. Pertinent negatives for diabetes include no blurred vision and no chest pain. There are no hypoglycemic complications. Symptoms are stable. Risk factors for coronary artery disease include diabetes mellitus and hypertension. Current diabetic treatment includes diet. Continues to have right sided lumbar back pain with sciatica symptoms. Pt was treated for acute flare up 8/31/22. Given high dose prednisone, states that significantly helped, but once she stopped the discomfort came back. Review of Systems   Constitutional:  Negative for malaise/fatigue. Eyes:  Negative for blurred vision. Respiratory:  Negative for shortness of breath. Cardiovascular:  Negative for chest pain and palpitations. Neurological:  Negative for headaches.    Psychiatric/Behavioral:          Follows with recovery services for her mental health      Objective:/80   Pulse (!) 105   Wt 164 lb 6.4 oz (74.6 kg)   LMP 08/28/2016   SpO2 100%   BMI 26.53 kg/m²      Physical Exam  Vitals and nursing note reviewed. Constitutional:       Appearance: Normal appearance. HENT:      Head: Normocephalic. Cardiovascular:      Rate and Rhythm: Normal rate and regular rhythm. Heart sounds: Normal heart sounds. Pulmonary:      Effort: Pulmonary effort is normal.      Breath sounds: Normal breath sounds. Musculoskeletal:      Lumbar back: No swelling. Normal range of motion. Negative right straight leg raise test.   Skin:     General: Skin is warm and dry. Neurological:      General: No focal deficit present. Mental Status: She is alert and oriented to person, place, and time. Assessment:      1. Type 2 diabetes mellitus without complication, unspecified whether long term insulin use ([facility] [address]    2. Essential hypertension    3. Need for prophylactic vaccination and inoculation against varicella    4. Need for shingles vaccine    5. Sciatica of right side           Plan:    -diabetes well controlled with just diet. Will repeat A1c in 6 months  -BP controlled.   -routine labs ordered to be done in 6 months  -pain mgmt referral placed for sciatica.     Orders Placed This Encounter   Procedures    Zoster, SHINGRIX, (50 yrs +), IM    Comprehensive Metabolic Panel     Standing Status:   Future     Standing Expiration Date:   11/9/2023    Lipid Panel     Standing Status:   Future     Standing Expiration Date:   11/9/2023    Microalbumin, Ur     Standing Status:   Future     Standing Expiration Date:   11/9/2023    Hemoglobin A1C     Standing Status:   Future     Standing Expiration Date:   11/9/2023    Thiago Sung MD, Pain Management, [place]     Referral Priority:   Routine     Referral Type:   Eval and Treat     Referral Reason:   Specialty Services Required     Referred to Provider:   Tarun Gramajo MD     Requested Specialty:   Physical Medicine and Rehab     Number of Visits Requested:   1      Outpatient Encounter Medications as of 11/9/2022   Medication Sig Dispense Refill    lisinopril (PRINIVIL;ZESTRIL) 40 MG tablet TAKE 1 TABLET DAILY 90 tablet 0    atorvastatin (LIPITOR) 20 MG tablet Take 1 tablet by mouth daily 90 tablet 1    alendronate (FOSAMAX) 70 MG tablet       cinacalcet (SENSIPAR) 30 MG tablet       hydroCHLOROthiazide (MICROZIDE) 12.5 MG capsule TAKE 1 CAPSULE EVERY MORNING 90 capsule 3    escitalopram (LEXAPRO) 10 MG tablet 20 mg      omeprazole (PRILOSEC) 20 MG delayed release capsule Take 1 capsule by mouth daily 90 capsule 1    Blood Pressure KIT Check blood pressure daily and as needed E11.9 (hypertension) 1 kit 0    Blood Pressure Monitor CARY Monitor BP daily; Dx. Hypertension (I10) 1 Device 0    Insulin Pen Needle ([employer] PEN NEEDLES) 31G X 6 MM MISC 1 each by Does not apply route daily 100 each 3    Glucose Blood (BLOOD GLUCOSE TEST STRIPS) STRP Diagnosis 250.90, tests once daily 50 strip 11    Lancets MISC Diagnosis 250.90, tests once daily 50 each 11    lamoTRIgine (LAMICTAL) 200 MG tablet Take 200 mg by mouth daily      Blood Glucose Monitoring Suppl CARY Diagnosis 250.90, tests once daily 1 Device 0    Cholecalciferol (VITAMIN D PO) Take by mouth      SAPHRIS 10 MG SUBL sublingual tablet nightly       Multiple Vitamins-Minerals (MULTIVITAMIN PO) Take  by mouth daily. [DISCONTINUED] predniSONE (DELTASONE) 20 MG tablet One tab twice daily x 5 days, then one tab daily x 5 days. (Patient not taking: Reported on 11/9/2022) 15 tablet 0     No facility-administered encounter medications on file as of 11/9/2022.             Maricarmen Garcia, APRN - CNP

## 2022-12-05 ENCOUNTER — OFFICE VISIT (OUTPATIENT)
Dept: PAIN MANAGEMENT | Age: 62
End: 2022-12-05
Payer: COMMERCIAL

## 2022-12-05 VITALS
BODY MASS INDEX: 26.2 KG/M2 | HEART RATE: 93 BPM | WEIGHT: 163 LBS | DIASTOLIC BLOOD PRESSURE: 80 MMHG | SYSTOLIC BLOOD PRESSURE: 110 MMHG | HEIGHT: 66 IN | OXYGEN SATURATION: 94 %

## 2022-12-05 DIAGNOSIS — M54.17 LUMBOSACRAL RADICULITIS: ICD-10-CM

## 2022-12-05 DIAGNOSIS — M54.50 LUMBOSACRAL PAIN: Primary | ICD-10-CM

## 2022-12-05 DIAGNOSIS — R10.2 PELVIC PAIN: ICD-10-CM

## 2022-12-05 PROCEDURE — 99204 OFFICE O/P NEW MOD 45 MIN: CPT | Performed by: PHYSICAL MEDICINE & REHABILITATION

## 2022-12-05 PROCEDURE — 3078F DIAST BP <80 MM HG: CPT | Performed by: PHYSICAL MEDICINE & REHABILITATION

## 2022-12-05 PROCEDURE — 3074F SYST BP LT 130 MM HG: CPT | Performed by: PHYSICAL MEDICINE & REHABILITATION

## 2022-12-05 ASSESSMENT — ENCOUNTER SYMPTOMS
BACK PAIN: 1
RESPIRATORY NEGATIVE: 1
CONSTIPATION: 0
DIARRHEA: 0
EYES NEGATIVE: 1
ALLERGIC/IMMUNOLOGIC NEGATIVE: 1

## 2022-12-05 NOTE — PROGRESS NOTES
PAIN MANAGEMENTNEW PATIENT CONSULTATION  12/5/22    Meaghan Pierce Lizett  1960    ReferringProvider:  SAEED Jett CNP  [address],  [address] [place]    Primary Care Physician:  SAEED Jett CNP  [facility]    HPIinformation obtained from the patient as well as the Comprehensive Pain ManagementHealth Questionnaire filled out by the patient. The questionnaire will be scannedinto the electronic chart and PMH, PSH, meds, and allergies will be updates accordingly. Subjective:       CHIEF COMPLAINT:  This is a62 y.o. female patientwho presents with a complaint of Other (Lower buttock pain)      PAIN HPI:    Pain in lower sacral B  when sitting  rarely down legs      Sits for 20 minutes   pain  increases and has  to   stand up   No bowel or bladder   symptoms        Location: Back sacral   Location Modifier:  B   Severity of Pain: 3  Duration of Pain: Intermittent  Frequency of Pain: Intermittent  Aggravating Factors:  sitting mainly, Stretching, Bending, Straightening, Standing, Walking, Stairs, Kneeling, and Squatting  Limiting Behavior: no  Relieving Factors: Heat, Cold, and Medication -  Result of Injury: no  Work Related Injury: no  [place] of Worker Compensation Case: no      Prior evaluation:    Previous lower back problems:No    Previous workup: No   History of surgery in painful area:No    Previous pain medication trials have included:       Opioids: -     NSAID's:-     Muscle relaxants: -     Neuropathicpain meds: -    Previous Pain Management Physician: No   Nerve blocks, spinal injections:No   Typeof Pain Intervention -. Date of last Pain Intervention     Was there pain relief from Pain Intervention: No.    How long was your pain relief from the Pain Intervention . Sleep:       Difficultyfalling asleep:  No   Takes sleepingmedication: No    Mental health:    Patient feels - secondary to their current pain problems as described above.    H/O depression and anxiety: No   Patient is not seeing psychologist orpsychiatrist   Abuse history? -    Employed? No  Alcohol use?: No  Tobacco use?: No  Marijuana use?: No  Illicit drug use?: No    Imaging: Reviewed available imagingin our system with the patient. No results found. Referring physician records reviewed. Review of Systems   Constitutional:  Positive for fatigue. HENT: Negative. Eyes: Negative. Respiratory: Negative. Cardiovascular: Negative. Gastrointestinal:  Negative for constipation and diarrhea. Endocrine: Negative. Genitourinary:  Negative for difficulty urinating and flank pain. Musculoskeletal:  Positive for back pain and myalgias. Skin: Negative. Allergic/Immunologic: Negative. Neurological:  Positive for weakness and numbness. Hematological: Negative. Psychiatric/Behavioral:  Positive for sleep disturbance. Allergies   Allergen Reactions    Gabapentin Anaphylaxis    Lyrica [Pregabalin]     Wellbutrin [Bupropion] Other (See Comments)     Seeing double    Ciprofloxacin Other (See Comments)     Stomach pain    Keflex [Cephalexin] Other (See Comments)     Stomach pain       Outpatient Medications Prior to Visit   Medication Sig Dispense Refill    lisinopril (PRINIVIL;ZESTRIL) 40 MG tablet TAKE 1 TABLET DAILY 90 tablet 0    atorvastatin (LIPITOR) 20 MG tablet Take 1 tablet by mouth daily 90 tablet 1    alendronate (FOSAMAX) 70 MG tablet       hydroCHLOROthiazide (MICROZIDE) 12.5 MG capsule TAKE 1 CAPSULE EVERY MORNING 90 capsule 3    Blood Pressure KIT Check blood pressure daily and as needed E11.9 (hypertension) 1 kit 0    Blood Pressure Monitor CARY Monitor BP daily; Dx.  Hypertension (I10) 1 Device 0    Glucose Blood (BLOOD GLUCOSE TEST STRIPS) STRP Diagnosis 250.90, tests once daily 50 strip 11    Lancets MISC Diagnosis 250.90, tests once daily 50 each 11    lamoTRIgine (LAMICTAL) 200 MG tablet Take 200 mg by mouth daily      Cholecalciferol (VITAMIN D PO) Take by mouth      SAPHRIS 10 MG SUBL sublingual tablet nightly       Multiple Vitamins-Minerals (MULTIVITAMIN PO) Take  by mouth daily. cinacalcet (SENSIPAR) 30 MG tablet       escitalopram (LEXAPRO) 10 MG tablet 20 mg      omeprazole (PRILOSEC) 20 MG delayed release capsule Take 1 capsule by mouth daily 90 capsule 1    Insulin Pen Needle ([employer] PEN NEEDLES) 31G X 6 MM MISC 1 each by Does not apply route daily 100 each 3    Blood Glucose Monitoring Suppl CARY Diagnosis 250.90, tests once daily 1 Device 0     No facility-administered medications prior to visit. Past Medical History:   Diagnosis Date    Cervicogenic headache     Chronic sinusitis     Conductive hearing loss     Degenerative arthritis of cervical spine     Depression     Elevated fasting glucose     Epigastric abdominal pain     GERD (gastroesophageal reflux disease)     History of cervical dysplasia     LASER    History of methamphetamine abuse ([address]     (Quit -)    LSIL (low grade squamous intraepithelial lesion) on Pap smear     Cryo    Tobacco abuse        Past Surgical History:   Procedure Laterality Date    CATARACT REMOVAL Bilateral     CATARACT REMOVAL WITH IMPLANT Left 3/22/2016    Dr. Nuria Mirza, [address]      COLONOSCOPY  2017    Dr. Juan R Callahan, [address]  03/10/1987    Laser conization of cervix (Hx of moderate dysplasia on Pap test). UPPER GASTROINTESTINAL ENDOSCOPY  2013    Mild gastritis.        Family History   Problem Relation Age of Onset    Diabetes Mother          200 general health deterioration after falling    Osteoporosis Mother     High Cholesterol Father     Lung Cancer Father          age 61    Diabetes Other     Diabetes Other     Diabetes Brother      Social History     Socioeconomic History    Marital status:      Spouse name: None    Number of children: None    Years of education: None    Highest education level: None   Tobacco Use    Smoking status: Every Day     Packs/day: 0.25     Years: 40.00     Pack years: 10.00     Types: Cigarettes     Start date: 3/18/1975    Smokeless tobacco: Never   Vaping Use    Vaping Use: Never used   Substance and Sexual Activity    Alcohol use: Yes     Alcohol/week: 0.0 standard drinks     Comment: socially    Drug use: Yes     Types: Opiates , Marijuana (Weed)     Comment: Quit . Followed up at the recovery center. Sexual activity: Yes     Partners: Male     Birth control/protection: Post-menopausal     Comment: Menarche age 17  Menopause age 49     Social Determinants of Health     Financial Resource Strain: Low Risk     Difficulty of Paying Living Expenses: Not hard at all   Food Insecurity: No Food Insecurity    Worried About KBLE in the Last Year: Never true    Ran Out of Food in the Last Year: Never true         Objective:     Physical Exam:  Vitals:    12/05/22 1051   BP: 110/80   Pulse: 93   SpO2: 94%   Weight: 163 lb (73.9 kg)   Height: 5' 6\" (1.676 m)          Physical Exam  Musculoskeletal:      Lumbar back: Negative right straight leg raise test and negative left straight leg raise test.       Back Exam     Tenderness   The patient is experiencing tenderness in the lumbar (+ slump  B).     Range of Motion   Extension:  normal   Flexion:  normal   Lateral bend right:  normal   Lateral bend left:  normal   Rotation right:  normal   Rotation left:  normal     Muscle Strength   Right Quadriceps:  5/5   Left Quadriceps:  5/5   Right Hamstrings:  5/5   Left Hamstrings:  5/5     Tests   Straight leg raise right: negative  Straight leg raise left: negative    Other   Toe walk: normal  Heel walk: normal  Sensation: normal  Gait: normal            Labs:   Lab Results   Component Value Date    WBC 8.3 04/14/2022    HGB 12.5 04/14/2022    HCT 37.3 04/14/2022     04/14/2022    CHOL 197 04/14/2022    TRIG 103 04/14/2022    HDL 75

## 2022-12-06 ENCOUNTER — HOSPITAL ENCOUNTER (OUTPATIENT)
Dept: GENERAL RADIOLOGY | Age: 62
Discharge: HOME | End: 2022-12-08
Payer: COMMERCIAL

## 2022-12-06 DIAGNOSIS — M54.50 LUMBOSACRAL PAIN: ICD-10-CM

## 2022-12-06 PROCEDURE — 73521 X-RAY EXAM HIPS BI 2 VIEWS: CPT

## 2022-12-06 PROCEDURE — 72100 X-RAY EXAM L-S SPINE 2/3 VWS: CPT

## 2022-12-12 ENCOUNTER — OFFICE VISIT (OUTPATIENT)
Dept: PAIN MANAGEMENT | Age: 62
End: 2022-12-12
Payer: COMMERCIAL

## 2022-12-12 VITALS
DIASTOLIC BLOOD PRESSURE: 74 MMHG | OXYGEN SATURATION: 98 % | RESPIRATION RATE: 18 BRPM | HEIGHT: 66 IN | HEART RATE: 101 BPM | BODY MASS INDEX: 26.2 KG/M2 | WEIGHT: 163 LBS | SYSTOLIC BLOOD PRESSURE: 108 MMHG

## 2022-12-12 DIAGNOSIS — M47.817 LUMBOSACRAL SPONDYLOSIS WITHOUT MYELOPATHY: ICD-10-CM

## 2022-12-12 DIAGNOSIS — M54.17 LUMBOSACRAL RADICULITIS: Primary | ICD-10-CM

## 2022-12-12 DIAGNOSIS — G63 POLYNEUROPATHY ASSOCIATED WITH UNDERLYING DISEASE (HCC): ICD-10-CM

## 2022-12-12 PROCEDURE — 3078F DIAST BP <80 MM HG: CPT | Performed by: PHYSICAL MEDICINE & REHABILITATION

## 2022-12-12 PROCEDURE — 99214 OFFICE O/P EST MOD 30 MIN: CPT | Performed by: PHYSICAL MEDICINE & REHABILITATION

## 2022-12-12 PROCEDURE — 3074F SYST BP LT 130 MM HG: CPT | Performed by: PHYSICAL MEDICINE & REHABILITATION

## 2022-12-12 ASSESSMENT — ENCOUNTER SYMPTOMS
BACK PAIN: 1
DIARRHEA: 0
ALLERGIC/IMMUNOLOGIC NEGATIVE: 1
CONSTIPATION: 0
EYES NEGATIVE: 1
RESPIRATORY NEGATIVE: 1

## 2022-12-12 NOTE — PROGRESS NOTES
PAIN MANAGEMENT FOLLOW-UP NOTE  12/12/22    CHIEF COMPLAINT: This is a62 y.o. female patientwho returns to the [facility] Management [facility] with a history of Back Pain (Lower buttocks area )      PAIN HPI:Julissa Mariscal returns today for  reevaluation. Since the visit, the patient reports that the pain is not changed. States since July   over a day or so  noted  when sitting  for 15 minutes  intense symmetrical B  lower gluteal not anal  nor deep anterior  pelvis   No radiating down legs     Location: Back lower gluteal   Location Modifier: entire back  Severity of Pain: 4  Duration of Pain: Intermittent  Frequency of Pain: Intermittent  Aggravating Factors:  -  Limiting Behavior: Twisting  Relieving Factors: relaxation        Previous pain medication trials have included:          Mental health:    Patient feels - secondary to their current pain problems as described above. H/O depression and anxiety: No   Patient is not seeing psychologist orpsychiatrist   Abuse history? No    Employed? No    ANALGESIA:   Are your Current Pain medication (s) helping to decrease pain? No.   Current Pain score:      ADVERSE AFFECTS:   Medication Side Effects: No.    ACTIVITY:  Are you able to be more active with your pain medications? No      ABERRANT BEHAVIORS SINCE LAST VISIT? No    Review of Systems   Constitutional:  Positive for fatigue. HENT: Negative. Eyes: Negative. Respiratory: Negative. Cardiovascular: Negative. Gastrointestinal:  Negative for constipation and diarrhea. Endocrine: Negative. Genitourinary:  Negative for difficulty urinating and flank pain. Musculoskeletal:  Positive for back pain and myalgias. Skin: Negative. Allergic/Immunologic: Negative. Neurological:  Positive for weakness and numbness. Hematological: Negative. Psychiatric/Behavioral:  Positive for sleep disturbance.        Allergies   Allergen Reactions    Gabapentin Anaphylaxis    Lyrica [Pregabalin]     Wellbutrin [Bupropion] Other (See Comments)     Seeing double    Ciprofloxacin Other (See Comments)     Stomach pain    Keflex [Cephalexin] Other (See Comments)     Stomach pain       Outpatient Medications Prior to Visit   Medication Sig Dispense Refill    lisinopril (PRINIVIL;ZESTRIL) 40 MG tablet TAKE 1 TABLET DAILY 90 tablet 0    atorvastatin (LIPITOR) 20 MG tablet Take 1 tablet by mouth daily 90 tablet 1    alendronate (FOSAMAX) 70 MG tablet       cinacalcet (SENSIPAR) 30 MG tablet       hydroCHLOROthiazide (MICROZIDE) 12.5 MG capsule TAKE 1 CAPSULE EVERY MORNING 90 capsule 3    escitalopram (LEXAPRO) 10 MG tablet 20 mg      omeprazole (PRILOSEC) 20 MG delayed release capsule Take 1 capsule by mouth daily 90 capsule 1    Blood Pressure KIT Check blood pressure daily and as needed E11.9 (hypertension) 1 kit 0    Blood Pressure Monitor CARY Monitor BP daily; Dx. Hypertension (I10) 1 Device 0    Insulin Pen Needle ([employer] PEN NEEDLES) 31G X 6 MM MISC 1 each by Does not apply route daily 100 each 3    Glucose Blood (BLOOD GLUCOSE TEST STRIPS) STRP Diagnosis 250.90, tests once daily 50 strip 11    Lancets MISC Diagnosis 250.90, tests once daily 50 each 11    lamoTRIgine (LAMICTAL) 200 MG tablet Take 200 mg by mouth daily      Blood Glucose Monitoring Suppl CARY Diagnosis 250.90, tests once daily 1 Device 0    Cholecalciferol (VITAMIN D PO) Take by mouth      SAPHRIS 10 MG SUBL sublingual tablet nightly       Multiple Vitamins-Minerals (MULTIVITAMIN PO) Take  by mouth daily. No facility-administered medications prior to visit.        Past Medical History:   Diagnosis Date    Cervicogenic headache     Chronic sinusitis     Conductive hearing loss     Degenerative arthritis of cervical spine     Depression     Elevated fasting glucose     Epigastric abdominal pain     GERD (gastroesophageal reflux disease)     History of cervical dysplasia 1987    LASER    History of methamphetamine abuse ([facility] [address]     (Quit 6-2001)    LSIL (low grade squamous intraepithelial lesion) on Pap smear     Cryo    Tobacco abuse        Past Surgical History:   Procedure Laterality Date    CATARACT REMOVAL Bilateral     CATARACT REMOVAL WITH IMPLANT Left 3/22/2016    Dr. Berhane Pink, [address]      COLONOSCOPY  2017    Dr. Alaina Arevalo, [address] [place]    OTHER SURGICAL HISTORY  03/10/1987    Laser conization of cervix (Hx of moderate dysplasia on Pap test). UPPER GASTROINTESTINAL ENDOSCOPY  2013    Mild gastritis. Family History   Problem Relation Age of Onset    Diabetes Mother          200 general health deterioration after falling    Osteoporosis Mother     High Cholesterol Father     Lung Cancer Father          age 61    Diabetes Other     Diabetes Other     Diabetes Brother      Social History     Socioeconomic History    Marital status:      Spouse name: None    Number of children: None    Years of education: None    Highest education level: None   Tobacco Use    Smoking status: Every Day     Packs/day: 0.25     Years: 40.00     Pack years: 10.00     Types: Cigarettes     Start date: 3/18/1975    Smokeless tobacco: Never   Vaping Use    Vaping Use: Never used   Substance and Sexual Activity    Alcohol use: Yes     Alcohol/week: 0.0 standard drinks     Comment: socially    Drug use: Yes     Types: Opiates , Marijuana (Weed)     Comment: Quit . Followed up at the recovery center.     Sexual activity: Yes     Partners: Male     Birth control/protection: Post-menopausal     Comment: Menarche age 17  Menopause age 49     Social Determinants of Health     Financial Resource Strain: Low Risk     Difficulty of Paying Living Expenses: Not hard at all   Food Insecurity: No Food Insecurity    Worried About Running Out of Food in the Last Year: Never true    Ran Out of Food in the Last Year: Never true         Family and Social Historyreviewed in the electronic medical record. Imaging:Reviewed available imaging in our system with the patient. No results found. Objective:     Physical Exam:  Vitals:    12/12/22 0834   BP: 108/74   Pulse: (!) 101   Resp: 18   SpO2: 98%   Weight: 163 lb (73.9 kg)   Height: 5' 6\" (1.676 m)          Physical Exam  Constitutional:       General: She is not in acute distress. Appearance: Normal appearance. She is well-developed. She is not diaphoretic. HENT:      Head: Normocephalic and atraumatic. Right Ear: External ear normal. No decreased hearing noted. Left Ear: External ear normal. No decreased hearing noted. Nose: Nose normal.   Eyes:      General: Lids are normal. No scleral icterus. Conjunctiva/sclera: Conjunctivae normal.   Neck:      Trachea: Phonation normal.   Cardiovascular:      Comments: No BLE edema present  Pulmonary:      Effort: Pulmonary effort is normal. No accessory muscle usage or respiratory distress. Abdominal:      General: Abdomen is flat. Palpations: Abdomen is soft. Genitourinary:     Comments: deferred  Musculoskeletal:      Lumbar back: Negative right straight leg raise test and negative left straight leg raise test.   Skin:     General: Skin is warm and dry. Coloration: Skin is not pale. Findings: No erythema or rash. Neurological:      Mental Status: She is alert and oriented to person, place, and time. Psychiatric:         Speech: Speech normal.         Behavior: Behavior normal.   Back Exam     Tenderness   The patient is experiencing tenderness in the lumbar (- kemps,  tender with  normal sitting).     Range of Motion   Extension:  normal   Flexion:  normal   Lateral bend right:  normal   Lateral bend left:  normal   Rotation right:  normal   Rotation left:  normal     Muscle Strength   Right Quadriceps:  5/5   Left Quadriceps:  5/5   Right Hamstrings:  5/5   Left Hamstrings:  5/5     Tests   Straight leg raise right: negative  Straight leg raise left: negative    Other   Toe walk: normal  Heel walk: normal  Sensation: normal  Gait: normal                               Research  has found that  Spine injections    reduce pain and  give  better functional  outcomes. Assessment: This is a 63 y.o. female patient with:    Diagnosis:   Diagnosis Orders   1. Lumbosacral radiculitis  MRI LUMBAR SPINE WO CONTRAST      2. Lumbosacral spondylosis without myelopathy        3. Polyneuropathy associated with underlying disease ([facility] [address]            Medical Comorbidities:  As listed in the patient's past medical and surgical history    Functional Limitations:   Pain limits function and quality of life. Plan:   Lumbar MRI as this is persistent since July 2022    Meds:   Controlled Substances Monitoring: Pt educated about the risks of taking opiates,including increased sedation, constipation, slowed breathing, tolerance, dependence,and addiction. New Prescriptions    No medications on file      No orders of the defined types were placed in this encounter. Orders Placed This Encounter   Procedures    MRI LUMBAR SPINE WO CONTRAST     Standing Status:   Future     Standing Expiration Date:   12/12/2023     Order Specific Question:   Reason for exam:     Answer:   lumbar radiculitis  has  done  lumbar exerciises for 2+ months now     Order Specific Question:   What is the sedation requirement? Answer:   None       No follow-ups on file. Opioid medication has  significant  risk  benefit concerns. We  instruct    our patients that  a Random Urine Drug Screen is required  along with a  an Opioid assessment questionaire such as ORT  or SOAPP  The patient expressed understanding of the above assessment and plan. Totaltime spent face to face with patient was 25 minutes inwhich  50% or more of the time was spent in counseling, education about risk andbenefits of the above plan, and coordination of care.

## 2022-12-20 ENCOUNTER — HOSPITAL ENCOUNTER (OUTPATIENT)
Dept: MRI IMAGING | Age: 62
Discharge: HOME | End: 2022-12-22
Payer: COMMERCIAL

## 2022-12-20 DIAGNOSIS — M54.17 LUMBOSACRAL RADICULITIS: ICD-10-CM

## 2022-12-20 PROCEDURE — 72148 MRI LUMBAR SPINE W/O DYE: CPT

## 2022-12-27 ENCOUNTER — OFFICE VISIT (OUTPATIENT)
Dept: PAIN MANAGEMENT | Age: 62
End: 2022-12-27
Payer: COMMERCIAL

## 2022-12-27 VITALS
OXYGEN SATURATION: 99 % | BODY MASS INDEX: 26.68 KG/M2 | WEIGHT: 166 LBS | HEART RATE: 110 BPM | DIASTOLIC BLOOD PRESSURE: 84 MMHG | SYSTOLIC BLOOD PRESSURE: 104 MMHG | HEIGHT: 66 IN | RESPIRATION RATE: 18 BRPM

## 2022-12-27 DIAGNOSIS — M53.3 SACRAL PAIN: Primary | ICD-10-CM

## 2022-12-27 DIAGNOSIS — E83.52 HYPERCALCEMIA: ICD-10-CM

## 2022-12-27 DIAGNOSIS — F41.9 ANXIETY AND DEPRESSION: ICD-10-CM

## 2022-12-27 DIAGNOSIS — M47.817 LUMBOSACRAL SPONDYLOSIS WITHOUT MYELOPATHY: ICD-10-CM

## 2022-12-27 DIAGNOSIS — F32.A ANXIETY AND DEPRESSION: ICD-10-CM

## 2022-12-27 PROCEDURE — 3074F SYST BP LT 130 MM HG: CPT | Performed by: PHYSICAL MEDICINE & REHABILITATION

## 2022-12-27 PROCEDURE — 99214 OFFICE O/P EST MOD 30 MIN: CPT | Performed by: PHYSICAL MEDICINE & REHABILITATION

## 2022-12-27 PROCEDURE — 3078F DIAST BP <80 MM HG: CPT | Performed by: PHYSICAL MEDICINE & REHABILITATION

## 2022-12-27 ASSESSMENT — ENCOUNTER SYMPTOMS
EYES NEGATIVE: 1
CONSTIPATION: 0
DIARRHEA: 0
ALLERGIC/IMMUNOLOGIC NEGATIVE: 1
BACK PAIN: 1
RESPIRATORY NEGATIVE: 1

## 2022-12-27 NOTE — PROGRESS NOTES
PAIN MANAGEMENT FOLLOW-UP NOTE  12/27/22    CHIEF COMPLAINT: This is a62 y.o. female patientwho returns to the [facility] with a history of Back Pain (lower)      PAIN HPI:Julissa Phoenix returns today for  reevaluation. Since the visit, the patient reports that the pain is not changed. Pain is  less  strong  Still only when  sitting prolonged and   gets better  with   standing or  changing  position     Location: Back lower sacral  not so much  coccyx    Location Modifier: entire back  Severity of Pain: 5 only  after 20+ minutes sitting  Duration of Pain: Intermittent  Frequency of Pain: Intermittent  Aggravating Factors:  -  Limiting Behavior: Sitting   Relieving Factors: relaxation        Previous pain medication trials have included:          Mental health:    Patient feels - secondary to their current pain problems as described above. H/O depression and anxiety: No   Patient is seeing psychologist orpsychiatrist   Abuse history? No    Employed? No    ANALGESIA:   Are your Current Pain medication (s) helping to decrease pain? No.   Current Pain score:      ADVERSE AFFECTS:   Medication Side Effects: No.    ACTIVITY:  Are you able to be more active with your pain medications? No      ABERRANT BEHAVIORS SINCE LAST VISIT? No    Review of Systems   Constitutional:  Positive for fatigue. HENT: Negative. Eyes: Negative. Respiratory: Negative. Cardiovascular: Negative. Gastrointestinal:  Negative for constipation and diarrhea. Endocrine: Negative. Genitourinary:  Negative for difficulty urinating and flank pain. Musculoskeletal:  Positive for back pain and myalgias. Skin: Negative. Allergic/Immunologic: Negative. Neurological:  Positive for weakness and numbness. Hematological: Negative. Psychiatric/Behavioral:  Positive for sleep disturbance.        Allergies   Allergen Reactions    Gabapentin Anaphylaxis    Lyrica [Pregabalin]     Wellbutrin [Bupropion] Other (See Comments)     Seeing double    Ciprofloxacin Other (See Comments)     Stomach pain    Keflex [Cephalexin] Other (See Comments)     Stomach pain       Outpatient Medications Prior to Visit   Medication Sig Dispense Refill    lisinopril (PRINIVIL;ZESTRIL) 40 MG tablet TAKE 1 TABLET DAILY 90 tablet 0    atorvastatin (LIPITOR) 20 MG tablet Take 1 tablet by mouth daily 90 tablet 1    alendronate (FOSAMAX) 70 MG tablet       cinacalcet (SENSIPAR) 30 MG tablet       hydroCHLOROthiazide (MICROZIDE) 12.5 MG capsule TAKE 1 CAPSULE EVERY MORNING 90 capsule 3    escitalopram (LEXAPRO) 10 MG tablet 20 mg      omeprazole (PRILOSEC) 20 MG delayed release capsule Take 1 capsule by mouth daily 90 capsule 1    Blood Pressure KIT Check blood pressure daily and as needed E11.9 (hypertension) 1 kit 0    Blood Pressure Monitor CARY Monitor BP daily; Dx. Hypertension (I10) 1 Device 0    Insulin Pen Needle ([employer] PEN NEEDLES) 31G X 6 MM MISC 1 each by Does not apply route daily 100 each 3    Glucose Blood (BLOOD GLUCOSE TEST STRIPS) STRP Diagnosis 250.90, tests once daily 50 strip 11    Lancets MISC Diagnosis 250.90, tests once daily 50 each 11    lamoTRIgine (LAMICTAL) 200 MG tablet Take 200 mg by mouth daily      Blood Glucose Monitoring Suppl CARY Diagnosis 250.90, tests once daily 1 Device 0    Cholecalciferol (VITAMIN D PO) Take by mouth      SAPHRIS 10 MG SUBL sublingual tablet nightly       Multiple Vitamins-Minerals (MULTIVITAMIN PO) Take  by mouth daily. No facility-administered medications prior to visit.        Past Medical History:   Diagnosis Date    Cervicogenic headache     Chronic sinusitis     Conductive hearing loss     Degenerative arthritis of cervical spine     Depression     Elevated fasting glucose     Epigastric abdominal pain     GERD (gastroesophageal reflux disease)     History of cervical dysplasia 1987    LASER    History of methamphetamine abuse ([facility] [address]     (Quit 6-2001)    LSIL (low grade squamous intraepithelial lesion) on Pap smear     Cryo    Tobacco abuse        Past Surgical History:   Procedure Laterality Date    CATARACT REMOVAL Bilateral     CATARACT REMOVAL WITH IMPLANT Left 3/22/2016    Dr. Carey Kan, [address]      COLONOSCOPY  2017    Dr. Derick Partida, [address] [facility] HISTORY  03/10/1987    Laser conization of cervix (Hx of moderate dysplasia on Pap test). UPPER GASTROINTESTINAL ENDOSCOPY  2013    Mild gastritis. Family History   Problem Relation Age of Onset    Diabetes Mother          200 general health deterioration after falling    Osteoporosis Mother     High Cholesterol Father     Lung Cancer Father          age 61    Diabetes Other     Diabetes Other     Diabetes Brother      Social History     Socioeconomic History    Marital status:      Spouse name: None    Number of children: None    Years of education: None    Highest education level: None   Tobacco Use    Smoking status: Every Day     Packs/day: 0.25     Years: 40.00     Pack years: 10.00     Types: Cigarettes     Start date: 3/18/1975    Smokeless tobacco: Never   Vaping Use    Vaping Use: Never used   Substance and Sexual Activity    Alcohol use: Yes     Alcohol/week: 0.0 standard drinks     Comment: socially    Drug use: Yes     Types: Opiates , Marijuana (Weed)     Comment: Quit . Followed up at the recovery center.     Sexual activity: Yes     Partners: Male     Birth control/protection: Post-menopausal     Comment: Menarche age 17  Menopause age 49     Social Determinants of Health     Financial Resource Strain: Low Risk     Difficulty of Paying Living Expenses: Not hard at all   Food Insecurity: No Food Insecurity    Worried About Running Out of Food in the Last Year: Never true    Ran Out of Food in the Last Year: Never true         Family and Social Historyreviewed in the electronic medical record. Imaging:Reviewed available imaging in our system with the patient. No results found. OF THE LUMBAR SPINE WITHOUT CONTRAST, 12/20/2022 10:33 am       TECHNIQUE:   Multiplanar multisequence MRI of the lumbar spine was performed without the   administration of intravenous contrast.       COMPARISON:   None. HISTORY:   ORDERING SYSTEM PROVIDED HISTORY: Lumbosacral radiculitis   TECHNOLOGIST PROVIDED HISTORY:   lumbar radiculitis  has  done  lumbar exercises for 2+ months now   What is the sedation requirement?->None   Reason for Exam: Bilateral buttock pain and right leg pain, symptoms since   July 2022. Additional signs and symptoms: Lumbar radiculitis       FINDINGS:   BONES/ALIGNMENT: The vertebral body heights are maintained. There is   age-appropriate bone marrow signal.  There is multilevel degenerative disc   disease with loss of disc signal.  There is no disc space narrowing. There   is no spondylolisthesis. SPINAL CORD: The conus is normal in caliber and signal and terminates at the   L1 level. The cauda equina is unremarkable. SOFT TISSUES: The posterior paraspinal soft tissues are unremarkable. The   visualized abdominal structures are unremarkable. L1-L2: There is no significant disc herniation, spinal canal stenosis or   neural foraminal narrowing. L2-L3: There is no significant disc herniation, spinal canal stenosis or   neural foraminal narrowing. L3-L4: There is a circumferential disc bulge. There is no canal stenosis or   foraminal narrowing. L4-L5: There is a circumferential disc bulge with facet hypertrophy. There   is no canal stenosis or right foraminal narrowing. There is mild left   foraminal narrowing. L5-S1: There is a circumferential disc bulge. There is no canal stenosis. There is mild bilateral foraminal narrowing.            Impression   Multilevel degenerative change with mild left foraminal narrowing at L4-5 and   mild bilateral foraminal narrowing at L5-S1. RECOMMENDATIONS:   Unavailable     Objective:     Physical Exam:  Vitals:    12/27/22 0805   BP: 104/84   Pulse: (!) 110   Resp: 18   SpO2: 99%   Weight: 166 lb (75.3 kg)   Height: 5' 6\" (1.676 m)          Physical Exam  Constitutional:       General: She is not in acute distress. Appearance: Normal appearance. She is well-developed. She is not diaphoretic. HENT:      Head: Normocephalic and atraumatic. Right Ear: External ear normal. No decreased hearing noted. Left Ear: External ear normal. No decreased hearing noted. Nose: Nose normal.   Eyes:      General: Lids are normal. No scleral icterus. Conjunctiva/sclera: Conjunctivae normal.   Neck:      Trachea: Phonation normal.   Cardiovascular:      Comments: No BLE edema present  Pulmonary:      Effort: Pulmonary effort is normal. No accessory muscle usage or respiratory distress. Genitourinary:     Comments: deferred  Musculoskeletal:      Lumbar back: Negative right straight leg raise test and negative left straight leg raise test.   Skin:     General: Skin is warm and dry. Coloration: Skin is not pale. Findings: No erythema or rash. Neurological:      Mental Status: She is alert and oriented to person, place, and time. Psychiatric:         Speech: Speech normal.         Behavior: Behavior normal.   Back Exam     Tenderness   Back tenderness location: -slr - slump - rotation  very slight fabers  slight r upper uneveness.     Range of Motion   Extension:  normal   Flexion:  normal   Lateral bend right:  normal   Lateral bend left:  normal   Rotation right:  normal   Rotation left:  normal     Muscle Strength   Right Quadriceps:  5/5   Left Quadriceps:  5/5   Right Hamstrings:  5/5   Left Hamstrings:  5/5     Tests   Straight leg raise right: negative  Straight leg raise left: negative    Other   Toe walk: normal  Heel walk: normal  Sensation: normal  Gait: normal Research  has found that  Spine injections    reduce pain and  give  better functional  outcomes. Assessment: This is a 63 y.o. female patient with:    Diagnosis:   Diagnosis Orders   1. Sacral pain        2. Lumbosacral spondylosis without myelopathy        3. Hypercalcemia        4. Anxiety and depression            Medical Comorbidities:  As listed in the patient's past medical and surgical history    Functional Limitations:   Pain limits function and quality of life. Plan:   Not worsening  Unclear etiology  Patient  seeing endocrinology for hypercalcemia   can follow   Hold on any lumbosacral injection and follow OV 1 mo    Meds:   Controlled Substances Monitoring: Pt educated about the risks of taking opiates,including increased sedation, constipation, slowed breathing, tolerance, dependence,and addiction. New Prescriptions    No medications on file      No orders of the defined types were placed in this encounter. No orders of the defined types were placed in this encounter. No follow-ups on file. Opioid medication has  significant  risk  benefit concerns. We  instruct    our patients that  a Random Urine Drug Screen is required  along with a  an Opioid assessment questionaire such as ORT  or SOAPP  The patient expressed understanding of the above assessment and plan. Totaltime spent face to face with patient was 25 minutes inwhich  50% or more of the time was spent in counseling, education about risk andbenefits of the above plan, and coordination of care.

## 2023-01-13 DIAGNOSIS — I10 ESSENTIAL HYPERTENSION: ICD-10-CM

## 2023-01-13 DIAGNOSIS — E78.2 MIXED HYPERLIPIDEMIA: ICD-10-CM

## 2023-01-13 RX ORDER — ATORVASTATIN CALCIUM 20 MG/1
20 TABLET, FILM COATED ORAL DAILY
Qty: 90 TABLET | Refills: 1 | Status: SHIPPED | OUTPATIENT
Start: 2023-01-13

## 2023-01-13 RX ORDER — HYDROCHLOROTHIAZIDE 12.5 MG/1
CAPSULE, GELATIN COATED ORAL
Qty: 90 CAPSULE | Refills: 1 | Status: SHIPPED | OUTPATIENT
Start: 2023-01-13

## 2023-01-13 RX ORDER — LISINOPRIL 40 MG/1
TABLET ORAL
Qty: 90 TABLET | Refills: 1 | Status: SHIPPED | OUTPATIENT
Start: 2023-01-13

## 2023-01-24 ENCOUNTER — OFFICE VISIT (OUTPATIENT)
Dept: PAIN MANAGEMENT | Age: 63
End: 2023-01-24
Payer: COMMERCIAL

## 2023-01-24 VITALS
OXYGEN SATURATION: 99 % | HEART RATE: 115 BPM | RESPIRATION RATE: 18 BRPM | BODY MASS INDEX: 27.32 KG/M2 | DIASTOLIC BLOOD PRESSURE: 64 MMHG | HEIGHT: 66 IN | WEIGHT: 170 LBS | SYSTOLIC BLOOD PRESSURE: 124 MMHG

## 2023-01-24 DIAGNOSIS — M47.817 LUMBOSACRAL SPONDYLOSIS WITHOUT MYELOPATHY: Primary | ICD-10-CM

## 2023-01-24 DIAGNOSIS — Z79.891 ENCOUNTER FOR LONG-TERM OPIATE ANALGESIC USE: ICD-10-CM

## 2023-01-24 DIAGNOSIS — M81.6 LOCALIZED OSTEOPOROSIS WITHOUT CURRENT PATHOLOGICAL FRACTURE: ICD-10-CM

## 2023-01-24 PROCEDURE — 3074F SYST BP LT 130 MM HG: CPT | Performed by: PHYSICAL MEDICINE & REHABILITATION

## 2023-01-24 PROCEDURE — 3078F DIAST BP <80 MM HG: CPT | Performed by: PHYSICAL MEDICINE & REHABILITATION

## 2023-01-24 PROCEDURE — 99214 OFFICE O/P EST MOD 30 MIN: CPT | Performed by: PHYSICAL MEDICINE & REHABILITATION

## 2023-01-24 ASSESSMENT — ENCOUNTER SYMPTOMS
BACK PAIN: 1
CONSTIPATION: 0
ALLERGIC/IMMUNOLOGIC NEGATIVE: 1
EYES NEGATIVE: 1
RESPIRATORY NEGATIVE: 1
DIARRHEA: 0

## 2023-01-24 NOTE — PROGRESS NOTES
PAIN MANAGEMENT FOLLOW-UP NOTE  1/24/23    CHIEF COMPLAINT: This is a61 y.o. female patientwho returns to the Pain Management Clinic with a history of Rectal Pain      PAIN HPI:Julissa Phoenix returns today for  reevaluation. Since the visit, the patient reports that the pain is not changed. Has gotten used to   B lower gluteal  posterior upper thigh B  pain   Has osteoporsis density  1 year ago   On fosamax     Location: Backposterior pubis  B   Location Modifier: coccyx? Severity of Pain: 2-7  Duration of Pain: Intermittent  Frequency of Pain: Intermittent  Aggravating Factors:  -  Limiting Behavior: Twisting  Relieving Factors: relaxation        Previous pain medication trials have included:          Mental health:    Patient feels - secondary to their current pain problems as described above. H/O depression and anxiety: No   Patient is not seeing psychologist orpsychiatrist   Abuse history? No    Employed? No    ANALGESIA:   Are your Current Pain medication (s) helping to decrease pain? No.   Current Pain score:      ADVERSE AFFECTS:   Medication Side Effects: No.    ACTIVITY:  Are you able to be more active with your pain medications? No      ABERRANT BEHAVIORS SINCE LAST VISIT? No    Review of Systems   Constitutional:  Positive for fatigue. HENT: Negative. Eyes: Negative. Respiratory: Negative. Cardiovascular: Negative. Gastrointestinal:  Negative for constipation and diarrhea. Endocrine: Negative. Genitourinary:  Negative for difficulty urinating and flank pain. Musculoskeletal:  Positive for back pain and myalgias. Skin: Negative. Allergic/Immunologic: Negative. Neurological:  Positive for weakness and numbness. Hematological: Negative. Psychiatric/Behavioral:  Positive for sleep disturbance.        Allergies   Allergen Reactions    Gabapentin Anaphylaxis    Lyrica [Pregabalin]     Wellbutrin [Bupropion] Other (See Comments)     Seeing double    Ciprofloxacin Other (See Comments)     Stomach pain    Keflex [Cephalexin] Other (See Comments)     Stomach pain       Outpatient Medications Prior to Visit   Medication Sig Dispense Refill    atorvastatin (LIPITOR) 20 MG tablet Take 1 tablet by mouth daily 90 tablet 1    hydroCHLOROthiazide (MICROZIDE) 12.5 MG capsule TAKE 1 CAPSULE EVERY MORNING 90 capsule 1    lisinopril (PRINIVIL;ZESTRIL) 40 MG tablet TAKE 1 TABLET DAILY 90 tablet 1    alendronate (FOSAMAX) 70 MG tablet       cinacalcet (SENSIPAR) 30 MG tablet       escitalopram (LEXAPRO) 10 MG tablet 20 mg      omeprazole (PRILOSEC) 20 MG delayed release capsule Take 1 capsule by mouth daily 90 capsule 1    Blood Pressure KIT Check blood pressure daily and as needed E11.9 (hypertension) 1 kit 0    Blood Pressure Monitor CARY Monitor BP daily; Dx. Hypertension (I10) 1 Device 0    Insulin Pen Needle ([employer] PEN NEEDLES) 31G X 6 MM MISC 1 each by Does not apply route daily 100 each 3    Glucose Blood (BLOOD GLUCOSE TEST STRIPS) STRP Diagnosis 250.90, tests once daily 50 strip 11    Lancets MISC Diagnosis 250.90, tests once daily 50 each 11    lamoTRIgine (LAMICTAL) 200 MG tablet Take 200 mg by mouth daily      Blood Glucose Monitoring Suppl CARY Diagnosis 250.90, tests once daily 1 Device 0    Cholecalciferol (VITAMIN D PO) Take by mouth      SAPHRIS 10 MG SUBL sublingual tablet nightly       Multiple Vitamins-Minerals (MULTIVITAMIN PO) Take  by mouth daily. No facility-administered medications prior to visit.        Past Medical History:   Diagnosis Date    Cervicogenic headache     Chronic sinusitis     Conductive hearing loss     Degenerative arthritis of cervical spine     Depression     Elevated fasting glucose     Epigastric abdominal pain     GERD (gastroesophageal reflux disease)     History of cervical dysplasia 1987    LASER    History of methamphetamine abuse ([facility] [address]     (Quit 6-2001)    LSIL (low grade squamous intraepithelial lesion) on Pap smear 2002    Cryo    Tobacco abuse        Past Surgical History:   Procedure Laterality Date    CATARACT REMOVAL Bilateral     CATARACT REMOVAL WITH IMPLANT Left 3/22/2016    Dr. Edel Hummel, [address]      COLONOSCOPY  2017    Dr. Wesley Wood, [address] HISTORY  03/10/1987    Laser conization of cervix (Hx of moderate dysplasia on Pap test). UPPER GASTROINTESTINAL ENDOSCOPY  2013    Mild gastritis. Family History   Problem Relation Age of Onset    Diabetes Mother          200 general health deterioration after falling    Osteoporosis Mother     High Cholesterol Father     Lung Cancer Father          age 61    Diabetes Other     Diabetes Other     Diabetes Brother      Social History     Socioeconomic History    Marital status:      Spouse name: None    Number of children: None    Years of education: None    Highest education level: None   Tobacco Use    Smoking status: Every Day     Packs/day: 0.25     Years: 40.00     Pack years: 10.00     Types: Cigarettes     Start date: 3/18/1975    Smokeless tobacco: Never   Vaping Use    Vaping Use: Never used   Substance and Sexual Activity    Alcohol use: Yes     Alcohol/week: 0.0 standard drinks     Comment: socially    Drug use: Yes     Types: Opiates , Marijuana (Weed)     Comment: Quit . Followed up at the recovery center. Sexual activity: Yes     Partners: Male     Birth control/protection: Post-menopausal     Comment: Menarche age 17  Menopause age 49     Social Determinants of Health     Financial Resource Strain: Low Risk     Difficulty of Paying Living Expenses: Not hard at all   Food Insecurity: No Food Insecurity    Worried About [address] [employer] in the Last Year: Never true    Ran Out of Food in the Last Year: Never true         Family and Social Historyreviewed in the electronic medical record. Imaging:Reviewed available imaging in our system with the patient.   No results found. Objective:     Physical Exam:  Vitals:    01/24/23 0822   BP: 124/64   Pulse: (!) 115   Resp: 18   SpO2: 99%   Weight: 170 lb (77.1 kg)   Height: 5' 6\" (1.676 m)          Physical Exam  Constitutional:       General: She is not in acute distress. Appearance: Normal appearance. She is well-developed. She is not diaphoretic. HENT:      Head: Normocephalic and atraumatic. Right Ear: External ear normal. No decreased hearing noted. Left Ear: External ear normal. No decreased hearing noted. Nose: Nose normal.   Eyes:      General: Lids are normal. No scleral icterus. Conjunctiva/sclera: Conjunctivae normal.   Neck:      Trachea: Phonation normal.   Cardiovascular:      Comments: No BLE edema present  Pulmonary:      Effort: Pulmonary effort is normal. No accessory muscle usage or respiratory distress. Abdominal:      General: Abdomen is flat. Palpations: Abdomen is soft. Genitourinary:     Comments: deferred  Skin:     General: Skin is warm and dry. Coloration: Skin is not pale. Findings: No erythema or rash. Neurological:      Mental Status: She is alert and oriented to person, place, and time. Psychiatric:         Speech: Speech normal.         Behavior: Behavior normal.   Back Exam     Tenderness   Back tenderness location: mild tender B posterior thigh orgins. Research  has found that  Spine injections    reduce pain and  give  better functional  outcomes. Assessment: This is a 66 y.o. female patient with:    Diagnosis:   Diagnosis Orders   1. Lumbosacral spondylosis without myelopathy        2. Localized osteoporosis without current pathological fracture        3. Encounter for long-term opiate analgesic use            Medical Comorbidities:  As listed in the patient's past medical and surgical history    Functional Limitations:   Pain limits function and quality of life.      Plan:   Call if pain in lower pelvis sacroiliac  or lumbosacral B   worsens  call    Meds:   Controlled Substances Monitoring: Pt educated about the risks of taking opiates,including increased sedation, constipation, slowed breathing, tolerance, dependence,and addiction. New Prescriptions    No medications on file      No orders of the defined types were placed in this encounter. No orders of the defined types were placed in this encounter. No follow-ups on file. Opioid medication has  significant  risk  benefit concerns. We  instruct    our patients that  a Random Urine Drug Screen is required  along with a  an Opioid assessment questionaire such as ORT  or SOAPP  The patient expressed understanding of the above assessment and plan. Totaltime spent face to face with patient was 25 minutes inwhich  50% or more of the time was spent in counseling, education about risk andbenefits of the above plan, and coordination of care.

## 2023-02-15 DIAGNOSIS — Z12.31 SCREENING MAMMOGRAM, ENCOUNTER FOR: Primary | ICD-10-CM

## 2023-03-14 ENCOUNTER — HOSPITAL ENCOUNTER (OUTPATIENT)
Dept: MAMMOGRAPHY | Age: 63
Discharge: HOME | End: 2023-03-16
Payer: COMMERCIAL

## 2023-03-14 DIAGNOSIS — Z12.31 SCREENING MAMMOGRAM, ENCOUNTER FOR: ICD-10-CM

## 2023-03-14 PROCEDURE — 77067 SCR MAMMO BI INCL CAD: CPT

## 2023-03-27 ENCOUNTER — HOSPITAL ENCOUNTER (OUTPATIENT)
Age: 63
Discharge: HOME | End: 2023-03-27
Payer: COMMERCIAL

## 2023-03-27 DIAGNOSIS — I10 ESSENTIAL HYPERTENSION: ICD-10-CM

## 2023-03-27 DIAGNOSIS — E11.9 TYPE 2 DIABETES MELLITUS WITHOUT COMPLICATION, UNSPECIFIED WHETHER LONG TERM INSULIN USE (HCC): ICD-10-CM

## 2023-03-27 LAB
25(OH)D3 SERPL-MCNC: 47 NG/ML
ALBUMIN SERPL-MCNC: 4.8 G/DL (ref 3.5–5.2)
ALBUMIN/GLOBULIN RATIO: 1.8 (ref 1–2.5)
ALP SERPL-CCNC: 139 U/L (ref 35–104)
ALT SERPL-CCNC: 27 U/L (ref 5–33)
ANION GAP SERPL CALCULATED.3IONS-SCNC: 11 MMOL/L (ref 9–17)
AST SERPL-CCNC: 22 U/L
BILIRUB SERPL-MCNC: 0.3 MG/DL (ref 0.3–1.2)
BUN SERPL-MCNC: 25 MG/DL (ref 8–23)
BUN/CREAT BLD: 18 (ref 9–20)
CALCIUM SERPL-MCNC: 9.4 MG/DL (ref 8.6–10.4)
CALCIUM SERPL-MCNC: 9.4 MG/DL (ref 8.6–10.4)
CHLORIDE SERPL-SCNC: 105 MMOL/L (ref 98–107)
CHOLEST SERPL-MCNC: 187 MG/DL
CHOLESTEROL/HDL RATIO: 3.6
CO2 SERPL-SCNC: 27 MMOL/L (ref 20–31)
CREAT SERPL-MCNC: 1.38 MG/DL (ref 0.5–0.9)
CREATININE URINE: 142.5 MG/DL (ref 28–217)
GFR SERPL CREATININE-BSD FRML MDRD: 44 ML/MIN/1.73M2
GLUCOSE SERPL-MCNC: 113 MG/DL (ref 70–99)
HDLC SERPL-MCNC: 52 MG/DL
LDLC SERPL CALC-MCNC: 107 MG/DL (ref 0–130)
MICROALBUMIN/CREAT 24H UR: <12 MG/L
MICROALBUMIN/CREAT UR-RTO: NORMAL MCG/MG CREAT
POTASSIUM SERPL-SCNC: 4.5 MMOL/L (ref 3.7–5.3)
PROT SERPL-MCNC: 7.4 G/DL (ref 6.4–8.3)
PTH-INTACT SERPL-MCNC: 22.8 PG/ML (ref 14–72)
SODIUM SERPL-SCNC: 143 MMOL/L (ref 135–144)
TRIGL SERPL-MCNC: 139 MG/DL

## 2023-03-27 PROCEDURE — 82043 UR ALBUMIN QUANTITATIVE: CPT

## 2023-03-27 PROCEDURE — 82310 ASSAY OF CALCIUM: CPT

## 2023-03-27 PROCEDURE — 80053 COMPREHEN METABOLIC PANEL: CPT

## 2023-03-27 PROCEDURE — 83970 ASSAY OF PARATHORMONE: CPT

## 2023-03-27 PROCEDURE — 82570 ASSAY OF URINE CREATININE: CPT

## 2023-03-27 PROCEDURE — 83036 HEMOGLOBIN GLYCOSYLATED A1C: CPT

## 2023-03-27 PROCEDURE — 82306 VITAMIN D 25 HYDROXY: CPT

## 2023-03-27 PROCEDURE — 80061 LIPID PANEL: CPT

## 2023-03-27 PROCEDURE — 36415 COLL VENOUS BLD VENIPUNCTURE: CPT

## 2023-03-28 LAB
EST. AVERAGE GLUCOSE BLD GHB EST-MCNC: 128 MG/DL
HBA1C MFR BLD: 6.1 % (ref 4–6)

## 2023-05-03 ENCOUNTER — TELEPHONE (OUTPATIENT)
Dept: FAMILY MEDICINE CLINIC | Age: 63
End: 2023-05-03

## 2023-05-03 DIAGNOSIS — Z12.31 VISIT FOR SCREENING MAMMOGRAM: Primary | ICD-10-CM

## 2023-05-12 ENCOUNTER — OFFICE VISIT (OUTPATIENT)
Dept: FAMILY MEDICINE CLINIC | Age: 63
End: 2023-05-12
Payer: COMMERCIAL

## 2023-05-12 VITALS
RESPIRATION RATE: 18 BRPM | DIASTOLIC BLOOD PRESSURE: 82 MMHG | OXYGEN SATURATION: 95 % | BODY MASS INDEX: 27 KG/M2 | HEART RATE: 93 BPM | SYSTOLIC BLOOD PRESSURE: 114 MMHG | WEIGHT: 168 LBS | HEIGHT: 66 IN

## 2023-05-12 DIAGNOSIS — Z23 NEED FOR PROPHYLACTIC VACCINATION AND INOCULATION AGAINST VARICELLA: ICD-10-CM

## 2023-05-12 DIAGNOSIS — Z12.11 COLON CANCER SCREENING: ICD-10-CM

## 2023-05-12 DIAGNOSIS — E78.2 MIXED HYPERLIPIDEMIA: ICD-10-CM

## 2023-05-12 DIAGNOSIS — I10 ESSENTIAL HYPERTENSION: Primary | ICD-10-CM

## 2023-05-12 DIAGNOSIS — N18.32 STAGE 3B CHRONIC KIDNEY DISEASE (HCC): ICD-10-CM

## 2023-05-12 DIAGNOSIS — E11.9 TYPE 2 DIABETES MELLITUS WITHOUT COMPLICATION, UNSPECIFIED WHETHER LONG TERM INSULIN USE (HCC): ICD-10-CM

## 2023-05-12 DIAGNOSIS — I10 ESSENTIAL HYPERTENSION: ICD-10-CM

## 2023-05-12 DIAGNOSIS — K21.9 GASTROESOPHAGEAL REFLUX DISEASE WITHOUT ESOPHAGITIS: ICD-10-CM

## 2023-05-12 PROCEDURE — 3044F HG A1C LEVEL LT 7.0%: CPT | Performed by: NURSE PRACTITIONER

## 2023-05-12 PROCEDURE — 90471 IMMUNIZATION ADMIN: CPT | Performed by: NURSE PRACTITIONER

## 2023-05-12 PROCEDURE — 3074F SYST BP LT 130 MM HG: CPT | Performed by: NURSE PRACTITIONER

## 2023-05-12 PROCEDURE — 99214 OFFICE O/P EST MOD 30 MIN: CPT | Performed by: NURSE PRACTITIONER

## 2023-05-12 PROCEDURE — 3079F DIAST BP 80-89 MM HG: CPT | Performed by: NURSE PRACTITIONER

## 2023-05-12 PROCEDURE — 90750 HZV VACC RECOMBINANT IM: CPT | Performed by: NURSE PRACTITIONER

## 2023-05-12 RX ORDER — HYDROCHLOROTHIAZIDE 12.5 MG/1
CAPSULE, GELATIN COATED ORAL
Qty: 90 CAPSULE | Refills: 1 | OUTPATIENT
Start: 2023-05-12

## 2023-05-12 RX ORDER — ATORVASTATIN CALCIUM 20 MG/1
20 TABLET, FILM COATED ORAL DAILY
Qty: 90 TABLET | Refills: 1 | OUTPATIENT
Start: 2023-05-12

## 2023-05-12 RX ORDER — HYDROCHLOROTHIAZIDE 12.5 MG/1
CAPSULE, GELATIN COATED ORAL
Qty: 90 CAPSULE | Refills: 1 | Status: SHIPPED | OUTPATIENT
Start: 2023-05-12

## 2023-05-12 RX ORDER — LISINOPRIL 40 MG/1
TABLET ORAL
Qty: 90 TABLET | Refills: 1 | OUTPATIENT
Start: 2023-05-12

## 2023-05-12 RX ORDER — LISINOPRIL 40 MG/1
TABLET ORAL
Qty: 90 TABLET | Refills: 1 | Status: SHIPPED | OUTPATIENT
Start: 2023-05-12

## 2023-05-12 RX ORDER — ATORVASTATIN CALCIUM 20 MG/1
20 TABLET, FILM COATED ORAL DAILY
Qty: 90 TABLET | Refills: 3 | Status: SHIPPED | OUTPATIENT
Start: 2023-05-12

## 2023-05-12 ASSESSMENT — ENCOUNTER SYMPTOMS
BLOOD IN STOOL: 0
BLURRED VISION: 0
CONSTIPATION: 0
DIARRHEA: 0
ABDOMINAL PAIN: 0
SHORTNESS OF BREATH: 0

## 2023-05-12 ASSESSMENT — PATIENT HEALTH QUESTIONNAIRE - PHQ9
6. FEELING BAD ABOUT YOURSELF - OR THAT YOU ARE A FAILURE OR HAVE LET YOURSELF OR YOUR FAMILY DOWN: 0
SUM OF ALL RESPONSES TO PHQ QUESTIONS 1-9: 0
4. FEELING TIRED OR HAVING LITTLE ENERGY: 0
10. IF YOU CHECKED OFF ANY PROBLEMS, HOW DIFFICULT HAVE THESE PROBLEMS MADE IT FOR YOU TO DO YOUR WORK, TAKE CARE OF THINGS AT HOME, OR GET ALONG WITH OTHER PEOPLE: 0
9. THOUGHTS THAT YOU WOULD BE BETTER OFF DEAD, OR OF HURTING YOURSELF: 0
8. MOVING OR SPEAKING SO SLOWLY THAT OTHER PEOPLE COULD HAVE NOTICED. OR THE OPPOSITE, BEING SO FIGETY OR RESTLESS THAT YOU HAVE BEEN MOVING AROUND A LOT MORE THAN USUAL: 0
2. FEELING DOWN, DEPRESSED OR HOPELESS: 0
5. POOR APPETITE OR OVEREATING: 0
7. TROUBLE CONCENTRATING ON THINGS, SUCH AS READING THE NEWSPAPER OR WATCHING TELEVISION: 0
3. TROUBLE FALLING OR STAYING ASLEEP: 0
1. LITTLE INTEREST OR PLEASURE IN DOING THINGS: 0
SUM OF ALL RESPONSES TO PHQ9 QUESTIONS 1 & 2: 0
SUM OF ALL RESPONSES TO PHQ QUESTIONS 1-9: 0

## 2023-05-12 NOTE — PROGRESS NOTES
Subjective:      Patient ID: Ceci Gutierrez is a 66 y.o. female coming in for   Chief Complaint   Patient presents with    Diabetes     6 month follow up    Hypertension        Hypertension  This is a chronic problem. The current episode started more than 1 year ago. The problem is unchanged. The problem is controlled. Pertinent negatives include no anxiety, blurred vision, chest pain, headaches, malaise/fatigue, palpitations, peripheral edema or shortness of breath. There are no associated agents to hypertension. Risk factors for coronary artery disease include dyslipidemia and smoking/tobacco exposure. Past treatments include ACE inhibitors (lisinopril 40mg daily). The current treatment provides significant improvement. There are no compliance problems. Hypertensive end-organ damage includes kidney disease (which has been stable). Diabetes  She presents for her follow-up diabetic visit. She has type 2 diabetes mellitus. Her disease course has been stable (A1c 6.1%). There are no hypoglycemic associated symptoms. Pertinent negatives for hypoglycemia include no dizziness or headaches. Pertinent negatives for diabetes include no blurred vision, no chest pain and no fatigue. There are no hypoglycemic complications. Symptoms are stable. Risk factors for coronary artery disease include diabetes mellitus and hypertension. Current diabetic treatment includes diet. Review of Systems   Constitutional:  Negative for fatigue, malaise/fatigue and unexpected weight change. Eyes:  Negative for blurred vision. Respiratory:  Negative for shortness of breath. Cardiovascular:  Negative for chest pain, palpitations and leg swelling. Gastrointestinal:  Negative for abdominal pain, blood in stool, constipation and diarrhea. Skin:  Negative for rash. Neurological:  Negative for dizziness, light-headedness and headaches.    Psychiatric/Behavioral:          Follows with recovery services for mental health
2

## 2023-05-23 NOTE — TELEPHONE ENCOUNTER
----- Message from Macarena Walker LPN sent at 5/81/4277  1:21 PM EDT -----  Jay Grullon no longer here.   Please forward to [facility]
Spoke with patient and advised patient that these labs were within normal limits. Patient states these labs were supposed to be for . This writer advised patient that I would fax them to 's office.
You can access the FollowMyHealth Patient Portal offered by [facility] by registering at the following website: [email or web address]. By joining [facility]’s FollowMyHealth portal, you will also be able to view your health information using other applications (apps) compatible with our system.

## 2023-06-17 DIAGNOSIS — I10 ESSENTIAL HYPERTENSION: ICD-10-CM

## 2023-06-17 DIAGNOSIS — E78.2 MIXED HYPERLIPIDEMIA: ICD-10-CM

## 2023-06-20 RX ORDER — ATORVASTATIN CALCIUM 20 MG/1
TABLET, FILM COATED ORAL
Qty: 90 TABLET | Refills: 3 | Status: SHIPPED | OUTPATIENT
Start: 2023-06-20

## 2023-06-20 RX ORDER — HYDROCHLOROTHIAZIDE 12.5 MG/1
CAPSULE, GELATIN COATED ORAL
Qty: 90 CAPSULE | Refills: 3 | Status: SHIPPED | OUTPATIENT
Start: 2023-06-20

## 2023-06-20 NOTE — TELEPHONE ENCOUNTER
Pt needs meds to go to [facility].     Lyndsey Pollack called requesting a refill of the below medication which has been pended for you:     Requested Prescriptions     Pending Prescriptions Disp Refills    atorvastatin (LIPITOR) 20 MG tablet [Pharmacy Med Name: Atorvastatin Calcium 20 MG Oral Tablet] 90 tablet 3     Sig: TAKE 1 TABLET BY MOUTH ONCE  DAILY    hydroCHLOROthiazide (MICROZIDE) 12.5 MG capsule [Pharmacy Med Name: hydroCHLOROthiazide 12.5 MG Oral Capsule] 90 capsule 3     Sig: TAKE 1 CAPSULE BY MOUTH IN THE  MORNING       Last Appointment Date: 5/12/2023  Next Appointment Date: 11/13/2023    Allergies   Allergen Reactions    Gabapentin Anaphylaxis    Lyrica [Pregabalin]     Wellbutrin [Bupropion] Other (See Comments)     Seeing double    Ciprofloxacin Other (See Comments)     Stomach pain    Keflex [Cephalexin] Other (See Comments)     Stomach pain

## 2023-07-04 DIAGNOSIS — I10 ESSENTIAL HYPERTENSION: ICD-10-CM

## 2023-07-05 RX ORDER — LISINOPRIL 40 MG/1
TABLET ORAL
Qty: 90 TABLET | Refills: 3 | Status: SHIPPED | OUTPATIENT
Start: 2023-07-05

## 2023-07-05 NOTE — TELEPHONE ENCOUNTER
Margarita High called requesting a refill of the below medication which has been pended for you:     Requested Prescriptions     Pending Prescriptions Disp Refills    lisinopril (PRINIVIL;ZESTRIL) 40 MG tablet [Pharmacy Med Name: Lisinopril 40 MG Oral Tablet] 90 tablet 3     Sig: TAKE 1 TABLET BY MOUTH DAILY       Last Appointment Date: 5/12/2023  Next Appointment Date: 11/13/2023    Allergies   Allergen Reactions    Gabapentin Anaphylaxis    Lyrica [Pregabalin]     Wellbutrin [Bupropion] Other (See Comments)     Seeing double    Ciprofloxacin Other (See Comments)     Stomach pain    Keflex [Cephalexin] Other (See Comments)     Stomach pain

## 2023-08-01 ENCOUNTER — TELEPHONE (OUTPATIENT)
Dept: FAMILY MEDICINE CLINIC | Age: 63
End: 2023-08-01

## 2023-08-01 NOTE — TELEPHONE ENCOUNTER
----- Message from Joya Garciain, [place] sent at 8/1/2023 10:19 AM EDT -----  Subject: Refill Request    QUESTIONS  Name of Medication? lisinopril (PRINIVIL;ZESTRIL) 40 MG tablet  Patient-reported dosage and instructions? Take 1 tab by mouth daily  How many days do you have left? 12  Preferred Pharmacy? [facility] ([address] [place] [address] )  Pharmacy phone number (if available)? [phone]  Additional Information for Provider? 90 day supply  ---------------------------------------------------------------------------  --------------  CALL BACK INFO  What is the best way for the office to contact you? OK to leave message on   voicemail  Preferred Call Back Phone Number? [phone]  ---------------------------------------------------------------------------  --------------  SCRIPT ANSWERS  Relationship to Patient?  Self

## 2023-10-02 ENCOUNTER — HOSPITAL ENCOUNTER (OUTPATIENT)
Age: 63
Discharge: HOME | End: 2023-10-02
Payer: COMMERCIAL

## 2023-10-02 LAB
ALBUMIN SERPL-MCNC: 4.9 G/DL (ref 3.5–5.2)
ALBUMIN/GLOB SERPL: 1.8 {RATIO} (ref 1–2.5)
ALP SERPL-CCNC: 140 U/L (ref 35–104)
ALT SERPL-CCNC: 28 U/L (ref 5–33)
ANION GAP SERPL CALCULATED.3IONS-SCNC: 13 MMOL/L (ref 9–17)
AST SERPL-CCNC: 25 U/L
BILIRUB SERPL-MCNC: 0.3 MG/DL (ref 0.3–1.2)
BUN SERPL-MCNC: 21 MG/DL (ref 8–23)
BUN/CREAT SERPL: 14 (ref 9–20)
CALCIUM SERPL-MCNC: 9.2 MG/DL (ref 8.6–10.4)
CHLORIDE SERPL-SCNC: 105 MMOL/L (ref 98–107)
CHOLEST SERPL-MCNC: 187 MG/DL
CHOLESTEROL/HDL RATIO: 3.8
CO2 SERPL-SCNC: 26 MMOL/L (ref 20–31)
CREAT SERPL-MCNC: 1.5 MG/DL (ref 0.5–0.9)
EST. AVERAGE GLUCOSE BLD GHB EST-MCNC: 117 MG/DL
GFR SERPL CREATININE-BSD FRML MDRD: 39 ML/MIN/1.73M2
GLUCOSE SERPL-MCNC: 139 MG/DL (ref 70–99)
HBA1C MFR BLD: 5.7 % (ref 4–6)
HDLC SERPL-MCNC: 49 MG/DL
LDLC SERPL CALC-MCNC: 99 MG/DL (ref 0–130)
POTASSIUM SERPL-SCNC: 4.1 MMOL/L (ref 3.7–5.3)
PROT SERPL-MCNC: 7.6 G/DL (ref 6.4–8.3)
SODIUM SERPL-SCNC: 144 MMOL/L (ref 135–144)
TRIGL SERPL-MCNC: 194 MG/DL

## 2023-10-02 PROCEDURE — 80053 COMPREHEN METABOLIC PANEL: CPT

## 2023-10-02 PROCEDURE — 83036 HEMOGLOBIN GLYCOSYLATED A1C: CPT

## 2023-10-02 PROCEDURE — 84146 ASSAY OF PROLACTIN: CPT

## 2023-10-02 PROCEDURE — 80061 LIPID PANEL: CPT

## 2023-10-02 PROCEDURE — 36415 COLL VENOUS BLD VENIPUNCTURE: CPT

## 2023-10-03 LAB — PROLACTIN SERPL-MCNC: 11.4 NG/ML (ref 4.79–23.3)

## 2023-11-17 ENCOUNTER — HOSPITAL ENCOUNTER (OUTPATIENT)
Age: 63
Discharge: HOME | End: 2023-11-17
Payer: COMMERCIAL

## 2023-11-17 DIAGNOSIS — E78.2 MIXED HYPERLIPIDEMIA: ICD-10-CM

## 2023-11-17 DIAGNOSIS — E11.9 TYPE 2 DIABETES MELLITUS WITHOUT COMPLICATION, UNSPECIFIED WHETHER LONG TERM INSULIN USE (HCC): ICD-10-CM

## 2023-11-17 DIAGNOSIS — I10 ESSENTIAL HYPERTENSION: ICD-10-CM

## 2023-11-17 LAB
25(OH)D3 SERPL-MCNC: 46.7 NG/ML
ALBUMIN SERPL-MCNC: 4.8 G/DL (ref 3.5–5.2)
ALBUMIN/GLOB SERPL: 1.8 {RATIO} (ref 1–2.5)
ALP SERPL-CCNC: 130 U/L (ref 35–104)
ALT SERPL-CCNC: 16 U/L (ref 5–33)
ANION GAP SERPL CALCULATED.3IONS-SCNC: 11 MMOL/L (ref 9–17)
AST SERPL-CCNC: 15 U/L
BILIRUB SERPL-MCNC: 0.5 MG/DL (ref 0.3–1.2)
BUN SERPL-MCNC: 25 MG/DL (ref 8–23)
BUN/CREAT SERPL: 14 (ref 9–20)
CALCIUM SERPL-MCNC: 10.3 MG/DL (ref 8.6–10.4)
CALCIUM SERPL-MCNC: 10.3 MG/DL (ref 8.6–10.4)
CHLORIDE SERPL-SCNC: 101 MMOL/L (ref 98–107)
CHOLEST SERPL-MCNC: 192 MG/DL
CHOLESTEROL/HDL RATIO: 3.6
CO2 SERPL-SCNC: 29 MMOL/L (ref 20–31)
CREAT SERPL-MCNC: 1.8 MG/DL (ref 0.5–0.9)
EST. AVERAGE GLUCOSE BLD GHB EST-MCNC: 126 MG/DL
GFR SERPL CREATININE-BSD FRML MDRD: 32 ML/MIN/1.73M2
GLUCOSE SERPL-MCNC: 144 MG/DL (ref 70–99)
HBA1C MFR BLD: 6 % (ref 4–6)
HDLC SERPL-MCNC: 53 MG/DL
LDLC SERPL CALC-MCNC: 99 MG/DL (ref 0–130)
POTASSIUM SERPL-SCNC: 4.9 MMOL/L (ref 3.7–5.3)
PROT SERPL-MCNC: 7.5 G/DL (ref 6.4–8.3)
SODIUM SERPL-SCNC: 141 MMOL/L (ref 135–144)
TRIGL SERPL-MCNC: 200 MG/DL

## 2023-11-17 PROCEDURE — 82306 VITAMIN D 25 HYDROXY: CPT

## 2023-11-17 PROCEDURE — 83036 HEMOGLOBIN GLYCOSYLATED A1C: CPT

## 2023-11-17 PROCEDURE — 80053 COMPREHEN METABOLIC PANEL: CPT

## 2023-11-17 PROCEDURE — 36415 COLL VENOUS BLD VENIPUNCTURE: CPT

## 2023-11-17 PROCEDURE — 80061 LIPID PANEL: CPT

## 2023-11-17 PROCEDURE — 82310 ASSAY OF CALCIUM: CPT

## 2023-11-17 PROCEDURE — 83970 ASSAY OF PARATHORMONE: CPT

## 2023-11-17 PROCEDURE — 82330 ASSAY OF CALCIUM: CPT

## 2023-11-18 LAB
CA-I BLD-SCNC: 1.34 MMOL/L (ref 1.13–1.33)
PTH-INTACT SERPL-MCNC: 22.7 PG/ML (ref 14–72)

## 2023-11-19 LAB
MISCELLANEOUS LAB TEST RESULT: NORMAL
TEST NAME: NORMAL

## 2023-11-21 LAB
MISCELLANEOUS LAB TEST RESULT: NORMAL
TEST NAME: NORMAL

## 2023-11-22 ENCOUNTER — OFFICE VISIT (OUTPATIENT)
Dept: FAMILY MEDICINE CLINIC | Age: 63
End: 2023-11-22
Payer: COMMERCIAL

## 2023-11-22 VITALS
SYSTOLIC BLOOD PRESSURE: 122 MMHG | HEIGHT: 66 IN | HEART RATE: 93 BPM | DIASTOLIC BLOOD PRESSURE: 74 MMHG | RESPIRATION RATE: 16 BRPM | BODY MASS INDEX: 26.52 KG/M2 | WEIGHT: 165 LBS | OXYGEN SATURATION: 100 %

## 2023-11-22 DIAGNOSIS — N18.32 STAGE 3B CHRONIC KIDNEY DISEASE (HCC): ICD-10-CM

## 2023-11-22 DIAGNOSIS — Z12.11 COLON CANCER SCREENING: ICD-10-CM

## 2023-11-22 DIAGNOSIS — I10 ESSENTIAL HYPERTENSION: Primary | ICD-10-CM

## 2023-11-22 DIAGNOSIS — E78.2 MIXED HYPERLIPIDEMIA: ICD-10-CM

## 2023-11-22 DIAGNOSIS — E11.9 TYPE 2 DIABETES MELLITUS WITHOUT COMPLICATION, UNSPECIFIED WHETHER LONG TERM INSULIN USE (HCC): ICD-10-CM

## 2023-11-22 PROCEDURE — 99214 OFFICE O/P EST MOD 30 MIN: CPT | Performed by: NURSE PRACTITIONER

## 2023-11-22 PROCEDURE — 3044F HG A1C LEVEL LT 7.0%: CPT | Performed by: NURSE PRACTITIONER

## 2023-11-22 PROCEDURE — 3074F SYST BP LT 130 MM HG: CPT | Performed by: NURSE PRACTITIONER

## 2023-11-22 PROCEDURE — 3078F DIAST BP <80 MM HG: CPT | Performed by: NURSE PRACTITIONER

## 2023-11-22 RX ORDER — LAMOTRIGINE 100 MG/1
TABLET ORAL
COMMUNITY
Start: 2023-11-09 | End: 2023-11-22

## 2023-11-22 ASSESSMENT — PATIENT HEALTH QUESTIONNAIRE - PHQ9
SUM OF ALL RESPONSES TO PHQ9 QUESTIONS 1 & 2: 0
SUM OF ALL RESPONSES TO PHQ QUESTIONS 1-9: 0
1. LITTLE INTEREST OR PLEASURE IN DOING THINGS: 0
6. FEELING BAD ABOUT YOURSELF - OR THAT YOU ARE A FAILURE OR HAVE LET YOURSELF OR YOUR FAMILY DOWN: 0
5. POOR APPETITE OR OVEREATING: 0
2. FEELING DOWN, DEPRESSED OR HOPELESS: 0
7. TROUBLE CONCENTRATING ON THINGS, SUCH AS READING THE NEWSPAPER OR WATCHING TELEVISION: 0
4. FEELING TIRED OR HAVING LITTLE ENERGY: 0
SUM OF ALL RESPONSES TO PHQ QUESTIONS 1-9: 0
9. THOUGHTS THAT YOU WOULD BE BETTER OFF DEAD, OR OF HURTING YOURSELF: 0
SUM OF ALL RESPONSES TO PHQ QUESTIONS 1-9: 0
10. IF YOU CHECKED OFF ANY PROBLEMS, HOW DIFFICULT HAVE THESE PROBLEMS MADE IT FOR YOU TO DO YOUR WORK, TAKE CARE OF THINGS AT HOME, OR GET ALONG WITH OTHER PEOPLE: 0
SUM OF ALL RESPONSES TO PHQ QUESTIONS 1-9: 0
3. TROUBLE FALLING OR STAYING ASLEEP: 0
8. MOVING OR SPEAKING SO SLOWLY THAT OTHER PEOPLE COULD HAVE NOTICED. OR THE OPPOSITE, BEING SO FIGETY OR RESTLESS THAT YOU HAVE BEEN MOVING AROUND A LOT MORE THAN USUAL: 0

## 2023-11-22 ASSESSMENT — COLUMBIA-SUICIDE SEVERITY RATING SCALE - C-SSRS
5. HAVE YOU STARTED TO WORK OUT OR WORKED OUT THE DETAILS OF HOW TO KILL YOURSELF? DO YOU INTEND TO CARRY OUT THIS PLAN?: NO
4. HAVE YOU HAD THESE THOUGHTS AND HAD SOME INTENTION OF ACTING ON THEM?: NO
7. DID THIS OCCUR IN THE LAST THREE MONTHS: NO
3. HAVE YOU BEEN THINKING ABOUT HOW YOU MIGHT KILL YOURSELF?: NO

## 2023-11-22 NOTE — PATIENT INSTRUCTIONS
Decrease lisinopril to half tablet daily (20mg) daily.    Get labs done approx one week prior to next appt in 6 months

## 2023-11-22 NOTE — PROGRESS NOTES
Subjective:      Patient ID: Wali Farmer is a 66 y.o. female coming in for   Chief Complaint   Patient presents with    Diabetes     6 month    Hypertension    Discuss Labs     Review labs      Hypertension  This is a chronic problem. The current episode started more than 1 year ago. The problem is unchanged. The problem is controlled. Pertinent negatives include no anxiety, blurred vision, chest pain, headaches, malaise/fatigue, palpitations, peripheral edema or shortness of breath. There are no associated agents to hypertension. Risk factors for coronary artery disease include dyslipidemia and smoking/tobacco exposure. Past treatments include ACE inhibitors (lisinopril 40mg daily, HCTZ 12.5mg daily). The current treatment provides significant improvement. There are no compliance problems. Hypertensive end-organ damage includes kidney disease (which has been stable). pt follows with nephrology. Diabetes  She presents for her follow-up diabetic visit. She has type 2 diabetes mellitus. Her disease course has been stable (A1c 6, which has been stable for patient). There are no hypoglycemic associated symptoms. Pertinent negatives for hypoglycemia include no dizziness or headaches. Pertinent negatives for diabetes include no blurred vision, no chest pain and no fatigue. There are no hypoglycemic complications. Symptoms are stable. Diabetic complications include nephropathy. Risk factors for coronary artery disease include diabetes mellitus and hypertension. Current diabetic treatment includes diet. An ACE inhibitor/angiotensin II receptor blocker is being taken. Creatinine did elevated to 1.8, previous 1.5 on 10/2/23. Does follow with neprhology, last seen 11/20/23.      Recent Results (from the past 672 hour(s))   Hemoglobin A1C    Collection Time: 11/17/23 11:30 AM   Result Value Ref Range    Hemoglobin A1C 6.0 4.0 - 6.0 %    Estimated Avg Glucose 126 mg/dL   Lipid Panel    Collection Time: 11/17/23 11:30 AM

## 2023-11-24 ENCOUNTER — OFFICE VISIT (OUTPATIENT)
Dept: PRIMARY CARE CLINIC | Age: 63
End: 2023-11-24
Payer: COMMERCIAL

## 2023-11-24 VITALS
DIASTOLIC BLOOD PRESSURE: 68 MMHG | RESPIRATION RATE: 16 BRPM | BODY MASS INDEX: 26.36 KG/M2 | WEIGHT: 164 LBS | HEIGHT: 66 IN | OXYGEN SATURATION: 98 % | SYSTOLIC BLOOD PRESSURE: 138 MMHG | HEART RATE: 110 BPM | TEMPERATURE: 96.9 F

## 2023-11-24 DIAGNOSIS — F41.1 GAD (GENERALIZED ANXIETY DISORDER): Primary | ICD-10-CM

## 2023-11-24 PROCEDURE — 99214 OFFICE O/P EST MOD 30 MIN: CPT | Performed by: FAMILY MEDICINE

## 2023-11-24 PROCEDURE — 3078F DIAST BP <80 MM HG: CPT | Performed by: FAMILY MEDICINE

## 2023-11-24 PROCEDURE — 3075F SYST BP GE 130 - 139MM HG: CPT | Performed by: FAMILY MEDICINE

## 2023-11-24 RX ORDER — BUSPIRONE HYDROCHLORIDE 10 MG/1
10 TABLET ORAL 3 TIMES DAILY PRN
Qty: 90 TABLET | Refills: 1 | Status: SHIPPED | OUTPATIENT
Start: 2023-11-24

## 2023-11-24 RX ORDER — DENOSUMAB 60 MG/ML
INJECTION SUBCUTANEOUS
COMMUNITY
Start: 2023-11-22

## 2023-11-24 RX ORDER — FLUOXETINE 10 MG/1
10 CAPSULE ORAL DAILY
Qty: 30 CAPSULE | Refills: 1 | Status: SHIPPED | OUTPATIENT
Start: 2023-11-24

## 2023-11-24 SDOH — ECONOMIC STABILITY: INCOME INSECURITY: HOW HARD IS IT FOR YOU TO PAY FOR THE VERY BASICS LIKE FOOD, HOUSING, MEDICAL CARE, AND HEATING?: NOT HARD AT ALL

## 2023-11-24 SDOH — ECONOMIC STABILITY: FOOD INSECURITY: WITHIN THE PAST 12 MONTHS, THE FOOD YOU BOUGHT JUST DIDN'T LAST AND YOU DIDN'T HAVE MONEY TO GET MORE.: NEVER TRUE

## 2023-11-24 SDOH — ECONOMIC STABILITY: HOUSING INSECURITY
IN THE LAST 12 MONTHS, WAS THERE A TIME WHEN YOU DID NOT HAVE A STEADY PLACE TO SLEEP OR SLEPT IN A SHELTER (INCLUDING NOW)?: NO

## 2023-11-24 SDOH — ECONOMIC STABILITY: FOOD INSECURITY: WITHIN THE PAST 12 MONTHS, YOU WORRIED THAT YOUR FOOD WOULD RUN OUT BEFORE YOU GOT MONEY TO BUY MORE.: NEVER TRUE

## 2023-11-24 ASSESSMENT — PATIENT HEALTH QUESTIONNAIRE - PHQ9
3. TROUBLE FALLING OR STAYING ASLEEP: 3
SUM OF ALL RESPONSES TO PHQ QUESTIONS 1-9: 10
7. TROUBLE CONCENTRATING ON THINGS, SUCH AS READING THE NEWSPAPER OR WATCHING TELEVISION: 1
SUM OF ALL RESPONSES TO PHQ QUESTIONS 1-9: 10
1. LITTLE INTEREST OR PLEASURE IN DOING THINGS: 1
SUM OF ALL RESPONSES TO PHQ9 QUESTIONS 1 & 2: 2
8. MOVING OR SPEAKING SO SLOWLY THAT OTHER PEOPLE COULD HAVE NOTICED. OR THE OPPOSITE, BEING SO FIGETY OR RESTLESS THAT YOU HAVE BEEN MOVING AROUND A LOT MORE THAN USUAL: 1
4. FEELING TIRED OR HAVING LITTLE ENERGY: 3
5. POOR APPETITE OR OVEREATING: 0
6. FEELING BAD ABOUT YOURSELF - OR THAT YOU ARE A FAILURE OR HAVE LET YOURSELF OR YOUR FAMILY DOWN: 0
SUM OF ALL RESPONSES TO PHQ QUESTIONS 1-9: 10
9. THOUGHTS THAT YOU WOULD BE BETTER OFF DEAD, OR OF HURTING YOURSELF: 0
SUM OF ALL RESPONSES TO PHQ QUESTIONS 1-9: 10
10. IF YOU CHECKED OFF ANY PROBLEMS, HOW DIFFICULT HAVE THESE PROBLEMS MADE IT FOR YOU TO DO YOUR WORK, TAKE CARE OF THINGS AT HOME, OR GET ALONG WITH OTHER PEOPLE: 1
2. FEELING DOWN, DEPRESSED OR HOPELESS: 1

## 2023-11-24 ASSESSMENT — ENCOUNTER SYMPTOMS
CHEST TIGHTNESS: 0
NAUSEA: 0
SHORTNESS OF BREATH: 0
DIARRHEA: 0
WHEEZING: 0
ABDOMINAL PAIN: 1
CONSTIPATION: 0
COUGH: 0

## 2023-11-24 ASSESSMENT — ANXIETY QUESTIONNAIRES
7. FEELING AFRAID AS IF SOMETHING AWFUL MIGHT HAPPEN: 3
2. NOT BEING ABLE TO STOP OR CONTROL WORRYING: 3
4. TROUBLE RELAXING: 3
IF YOU CHECKED OFF ANY PROBLEMS ON THIS QUESTIONNAIRE, HOW DIFFICULT HAVE THESE PROBLEMS MADE IT FOR YOU TO DO YOUR WORK, TAKE CARE OF THINGS AT HOME, OR GET ALONG WITH OTHER PEOPLE: SOMEWHAT DIFFICULT
GAD7 TOTAL SCORE: 15
5. BEING SO RESTLESS THAT IT IS HARD TO SIT STILL: 3
1. FEELING NERVOUS, ANXIOUS, OR ON EDGE: 1
3. WORRYING TOO MUCH ABOUT DIFFERENT THINGS: 1
6. BECOMING EASILY ANNOYED OR IRRITABLE: 1

## 2023-11-24 NOTE — PROGRESS NOTES
[Cephalexin] Other (See Comments)     Stomach pain       Subjective:     Review of Systems   Constitutional:  Positive for activity change, appetite change and fatigue. Negative for fever. Eyes:  Negative for visual disturbance. Respiratory:  Negative for cough, chest tightness, shortness of breath and wheezing. Cardiovascular:  Negative for chest pain, palpitations and leg swelling. Gastrointestinal:  Positive for abdominal pain. Negative for constipation, diarrhea and nausea. Skin:  Negative for rash. Neurological:  Positive for headaches. Negative for dizziness and syncope. Psychiatric/Behavioral:  Positive for decreased concentration, dysphoric mood and sleep disturbance. Negative for hallucinations, self-injury and suicidal ideas. The patient is nervous/anxious. Objective:      Physical Exam  Vitals and nursing note reviewed. Constitutional:       General: She is not in acute distress. Appearance: She is well-developed. Eyes:      Conjunctiva/sclera: Conjunctivae normal.   Neck:      Thyroid: No thyromegaly. Cardiovascular:      Rate and Rhythm: Normal rate and regular rhythm. Heart sounds: Normal heart sounds. No murmur heard. Pulmonary:      Effort: Pulmonary effort is normal. No respiratory distress. Breath sounds: Normal breath sounds. No wheezing. Musculoskeletal:      Cervical back: Normal range of motion and neck supple. Lymphadenopathy:      Cervical: No cervical adenopathy. Skin:     General: Skin is warm and dry. Findings: No erythema or rash. Neurological:      Mental Status: She is alert and oriented to person, place, and time. Psychiatric:         Attention and Perception: Attention normal.         Mood and Affect: Mood is anxious. Behavior: Behavior is hyperactive. Thought Content:  Thought content normal.         Cognition and Memory: Cognition normal.       /68 (Site: Left Upper Arm, Position: Sitting, Cuff Size:

## 2023-11-25 ASSESSMENT — ENCOUNTER SYMPTOMS
SHORTNESS OF BREATH: 0
BLURRED VISION: 0

## 2023-12-01 ENCOUNTER — OFFICE VISIT (OUTPATIENT)
Dept: PRIMARY CARE CLINIC | Age: 63
End: 2023-12-01

## 2023-12-01 VITALS
WEIGHT: 167.13 LBS | OXYGEN SATURATION: 98 % | RESPIRATION RATE: 15 BRPM | HEART RATE: 101 BPM | BODY MASS INDEX: 26.86 KG/M2 | HEIGHT: 66 IN | DIASTOLIC BLOOD PRESSURE: 82 MMHG | SYSTOLIC BLOOD PRESSURE: 120 MMHG | TEMPERATURE: 98.2 F

## 2023-12-01 DIAGNOSIS — S63.501A WRIST SPRAIN, RIGHT, INITIAL ENCOUNTER: Primary | ICD-10-CM

## 2023-12-01 ASSESSMENT — ENCOUNTER SYMPTOMS
EYES NEGATIVE: 1
RESPIRATORY NEGATIVE: 1
ALLERGIC/IMMUNOLOGIC NEGATIVE: 1
GASTROINTESTINAL NEGATIVE: 1

## 2023-12-01 NOTE — PROGRESS NOTES
[facility]  [address]  [address] [address]  Dept: [phone]  Dept Fax: [phone]    Sosa Gibbs  is a 66 y.o. female who presents today for her medical conditions/complaints as noted below. Sosa Gibbs is c/o of   Chief Complaint   Patient presents with    Wrist Pain     Right wrist swelling, she was wrapping gifts last night when she noticed pain in her wrist.        HPI:     Wrist Pain   The pain is present in the right wrist. This is a new (states was wrapping presents last evening and woke up this AM with pain and swelling) problem. The current episode started yesterday. There has been no history of extremity trauma. The problem occurs constantly. The problem has been unchanged. The quality of the pain is described as sharp. The pain is at a severity of 4/10. The pain is mild. Associated symptoms include joint swelling and stiffness. Pertinent negatives include no fever, numbness or tingling. The symptoms are aggravated by activity. She has tried acetaminophen for the symptoms.          Past Medical History:   Diagnosis Date    Cervicogenic headache     Chronic sinusitis     Conductive hearing loss     Degenerative arthritis of cervical spine     Depression     Elevated fasting glucose     Epigastric abdominal pain     GERD (gastroesophageal reflux disease)     History of cervical dysplasia 1987    LASER    History of methamphetamine abuse ([facility])     (Quit 6-2001)    LSIL (low grade squamous intraepithelial lesion) on Pap smear 2002    Cryo    Tobacco abuse      Past Surgical History:   Procedure Laterality Date    CATARACT REMOVAL Bilateral     CATARACT REMOVAL WITH IMPLANT Left 3/22/2016    Dr. Carol Osborne, [address] [facility]      COLONOSCOPY  03/03/2017    Dr. Tiesha Cloud, [address] [facility]  [birth year]1987

## 2023-12-01 NOTE — PATIENT INSTRUCTIONS
Wear splint x 1 week, advance as tolerated  Use ice to knee 20 mins at a time 4-6 times day  May use ibuprofen/ tylenol as needed for pain  Decrease activity today and tomorrow may increase as tolerated after two days of rest  If symptoms worsen follow up with PCP  Patient/ father verbalized understanding and agrees with plan of care

## 2023-12-03 ENCOUNTER — HOSPITAL ENCOUNTER (EMERGENCY)
Age: 63
Discharge: HOME | End: 2023-12-04
Attending: EMERGENCY MEDICINE
Payer: COMMERCIAL

## 2023-12-03 DIAGNOSIS — F19.10 POLYSUBSTANCE ABUSE (HCC): Primary | ICD-10-CM

## 2023-12-03 PROCEDURE — 96375 TX/PRO/DX INJ NEW DRUG ADDON: CPT

## 2023-12-03 PROCEDURE — 99284 EMERGENCY DEPT VISIT MOD MDM: CPT

## 2023-12-03 PROCEDURE — 96374 THER/PROPH/DIAG INJ IV PUSH: CPT

## 2023-12-03 RX ORDER — 0.9 % SODIUM CHLORIDE 0.9 %
1000 INTRAVENOUS SOLUTION INTRAVENOUS ONCE
Status: COMPLETED | OUTPATIENT
Start: 2023-12-04 | End: 2023-12-04

## 2023-12-04 ENCOUNTER — APPOINTMENT (OUTPATIENT)
Dept: CT IMAGING | Age: 63
End: 2023-12-04
Payer: COMMERCIAL

## 2023-12-04 VITALS
SYSTOLIC BLOOD PRESSURE: 126 MMHG | BODY MASS INDEX: 26.52 KG/M2 | WEIGHT: 165 LBS | RESPIRATION RATE: 16 BRPM | DIASTOLIC BLOOD PRESSURE: 76 MMHG | TEMPERATURE: 98 F | HEIGHT: 66 IN | OXYGEN SATURATION: 100 % | HEART RATE: 70 BPM

## 2023-12-04 LAB
ALBUMIN SERPL-MCNC: 3.8 G/DL (ref 3.5–5.2)
ALBUMIN/GLOB SERPL: 1.8 {RATIO} (ref 1–2.5)
ALP SERPL-CCNC: 115 U/L (ref 35–104)
ALT SERPL-CCNC: 16 U/L (ref 5–33)
AMPHET UR QL SCN: POSITIVE
ANION GAP SERPL CALCULATED.3IONS-SCNC: 11 MMOL/L (ref 9–17)
APAP SERPL-MCNC: <5 UG/ML (ref 10–30)
AST SERPL-CCNC: 13 U/L
BARBITURATES UR QL SCN: NEGATIVE
BASOPHILS # BLD: 0.06 K/UL (ref 0–0.2)
BASOPHILS NFR BLD: 1 % (ref 0–2)
BENZODIAZ UR QL: NEGATIVE
BILIRUB SERPL-MCNC: 0.2 MG/DL (ref 0.3–1.2)
BUN SERPL-MCNC: 17 MG/DL (ref 8–23)
BUN/CREAT SERPL: 17 (ref 9–20)
CALCIUM SERPL-MCNC: 7.8 MG/DL (ref 8.6–10.4)
CANNABINOIDS UR QL SCN: POSITIVE
CHLORIDE SERPL-SCNC: 109 MMOL/L (ref 98–107)
CO2 SERPL-SCNC: 23 MMOL/L (ref 20–31)
COCAINE UR QL SCN: NEGATIVE
CREAT SERPL-MCNC: 1 MG/DL (ref 0.5–0.9)
EOSINOPHIL # BLD: 0.34 K/UL (ref 0–0.44)
EOSINOPHILS RELATIVE PERCENT: 4 % (ref 1–4)
ERYTHROCYTE [DISTWIDTH] IN BLOOD BY AUTOMATED COUNT: 12.3 % (ref 11.8–14.4)
ETHANOLAMINE SERPL-MCNC: <10 MG/DL
FENTANYL UR QL: NEGATIVE
GFR SERPL CREATININE-BSD FRML MDRD: >60 ML/MIN/1.73M2
GLUCOSE SERPL-MCNC: 207 MG/DL (ref 70–99)
HCT VFR BLD AUTO: 34.4 % (ref 36.3–47.1)
HGB BLD-MCNC: 11.6 G/DL (ref 11.9–15.1)
IMM GRANULOCYTES # BLD AUTO: 0.04 K/UL (ref 0–0.3)
IMM GRANULOCYTES NFR BLD: 0 %
LACTATE BLDV-SCNC: 2.5 MMOL/L (ref 0.5–2.2)
LYMPHOCYTES NFR BLD: 2.61 K/UL (ref 1.1–3.7)
LYMPHOCYTES RELATIVE PERCENT: 27 % (ref 24–43)
MCH RBC QN AUTO: 32.2 PG (ref 25.2–33.5)
MCHC RBC AUTO-ENTMCNC: 33.7 G/DL (ref 25.2–33.5)
MCV RBC AUTO: 95.6 FL (ref 82.6–102.9)
METHADONE UR QL: NEGATIVE
MONOCYTES NFR BLD: 0.6 K/UL (ref 0.1–1.2)
MONOCYTES NFR BLD: 6 % (ref 3–12)
NEUTROPHILS NFR BLD: 62 % (ref 36–65)
NEUTS SEG NFR BLD: 6.19 K/UL (ref 1.5–8.1)
NRBC BLD-RTO: 0 PER 100 WBC
OPIATES UR QL SCN: NEGATIVE
OXYCODONE UR QL SCN: NEGATIVE
PCP UR QL SCN: NEGATIVE
PLATELET # BLD AUTO: 270 K/UL (ref 138–453)
PMV BLD AUTO: 10.5 FL (ref 8.1–13.5)
POTASSIUM SERPL-SCNC: 3.8 MMOL/L (ref 3.7–5.3)
PROT SERPL-MCNC: 5.9 G/DL (ref 6.4–8.3)
RBC # BLD AUTO: 3.6 M/UL (ref 3.95–5.11)
SALICYLATES SERPL-MCNC: <1 MG/DL (ref 3–10)
SODIUM SERPL-SCNC: 143 MMOL/L (ref 135–144)
TEST INFORMATION: ABNORMAL
WBC OTHER # BLD: 9.8 K/UL (ref 3.5–11.3)

## 2023-12-04 PROCEDURE — 6360000002 HC RX W HCPCS: Performed by: EMERGENCY MEDICINE

## 2023-12-04 PROCEDURE — 80143 DRUG ASSAY ACETAMINOPHEN: CPT

## 2023-12-04 PROCEDURE — 70450 CT HEAD/BRAIN W/O DYE: CPT

## 2023-12-04 PROCEDURE — 80307 DRUG TEST PRSMV CHEM ANLYZR: CPT

## 2023-12-04 PROCEDURE — 96375 TX/PRO/DX INJ NEW DRUG ADDON: CPT

## 2023-12-04 PROCEDURE — 2580000003 HC RX 258: Performed by: EMERGENCY MEDICINE

## 2023-12-04 PROCEDURE — 93005 ELECTROCARDIOGRAM TRACING: CPT | Performed by: EMERGENCY MEDICINE

## 2023-12-04 PROCEDURE — 80179 DRUG ASSAY SALICYLATE: CPT

## 2023-12-04 PROCEDURE — 85025 COMPLETE CBC W/AUTO DIFF WBC: CPT

## 2023-12-04 PROCEDURE — G0480 DRUG TEST DEF 1-7 CLASSES: HCPCS

## 2023-12-04 PROCEDURE — 83605 ASSAY OF LACTIC ACID: CPT

## 2023-12-04 PROCEDURE — 36415 COLL VENOUS BLD VENIPUNCTURE: CPT

## 2023-12-04 PROCEDURE — 80053 COMPREHEN METABOLIC PANEL: CPT

## 2023-12-04 PROCEDURE — 96374 THER/PROPH/DIAG INJ IV PUSH: CPT

## 2023-12-04 RX ORDER — ONDANSETRON 2 MG/ML
4 INJECTION INTRAMUSCULAR; INTRAVENOUS ONCE
Status: COMPLETED | OUTPATIENT
Start: 2023-12-04 | End: 2023-12-04

## 2023-12-04 RX ORDER — LORAZEPAM 2 MG/ML
1 INJECTION INTRAMUSCULAR ONCE
Status: COMPLETED | OUTPATIENT
Start: 2023-12-04 | End: 2023-12-04

## 2023-12-04 RX ADMIN — ONDANSETRON 4 MG: 2 INJECTION INTRAMUSCULAR; INTRAVENOUS at 01:57

## 2023-12-04 RX ADMIN — LORAZEPAM 1 MG: 2 INJECTION INTRAMUSCULAR; INTRAVENOUS at 01:57

## 2023-12-04 RX ADMIN — SODIUM CHLORIDE 1000 ML: 9 INJECTION, SOLUTION INTRAVENOUS at 00:01

## 2023-12-04 NOTE — ED PROVIDER NOTES
eMERGENCY dEPARTMENT eNCOUnter      Pt Name: aMssiel Rosenberg  MRN: [id number]  [address] 1960  Date of evaluation: 12/3/2023      CHIEF COMPLAINT       Chief Complaint   Patient presents with    Drug Overdose          HISTORY OF PRESENT ILLNESS    Massiel Rosenberg is a 66 y.o. female who presents to the emergency department by EMS who were called out for an altered mental status. Apparently the patient has seizures and takes Lamictal she ended up taking 4 extra doses of her Lamictal tonight for possible total of 800 mg of Lamictal.  She comes in with slurred speech and twitchy extremities. She is answering questions appropriately but her speech is less than intelligible. Upon secondary evaluation of this patient after we had gotten her drug screen back and it was positive for both amphetamine and marijuana the patient admits to both smoking marijuana and taking cocaine. REVIEW OF SYSTEMS       Constitutional: No fevers or chills  HEENT: No sore throat, rhinorrhea, or earache, positive slurred speech  Eyes: No blurry vision or double vision no drainage  Cardiovascular: No chest pain or tachycardia  Respiratory: No wheezing or shortness of breath no cough  Gastrointestinal: No nausea, vomiting, diarrhea, constipation, or abdominal pain   : No hematuria or dysuria  Musculoskeletal: No swelling or pain  Skin: No rash   Neurological: No focal neurologic complaints, paresthesias, weakness, or headache    PAST MEDICAL HISTORY    has a past medical history of Cervicogenic headache, Chronic sinusitis, Conductive hearing loss, Degenerative arthritis of cervical spine, Depression, Elevated fasting glucose, Epigastric abdominal pain, GERD (gastroesophageal reflux disease), History of cervical dysplasia, History of methamphetamine abuse ([address]), LSIL (low grade squamous intraepithelial lesion) on Pap smear, and Tobacco abuse.     SURGICAL HISTORY      has a past surgical history that includes Upper gastrointestinal endoscopy

## 2023-12-04 NOTE — ED TRIAGE NOTES
Pt arrives and with overdose of her Lamictal x4 tabs additional approx 3 hours PTA because pt states she was trying to sleep because she does not feel well. Denies suicidal thoughts or gestures. Oriented and answers all questions when asked.

## 2023-12-04 NOTE — ED NOTES
left and he was leaving wife scoffed. He then states\" I need to go home and sleep. I don't have any meth like you do\" Pt responded\" stop\" and  left the room.       Burton Tyson RN  12/04/23 [id number]

## 2023-12-04 NOTE — DISCHARGE INSTRUCTIONS
You should definitely try to avoid any kind of intoxicating drugs especially should try to avoid cocaine you should also avoid any kind of amphetamine. We are suggesting that you see your own doctor within 1 to 2 days for further evaluation and for treatment to outpatient drug rehab center. Return back to emergency setting if you have chest pain or shortness of breath.

## 2023-12-05 LAB
EKG ATRIAL RATE: 89 BPM
EKG P AXIS: 76 DEGREES
EKG P-R INTERVAL: 138 MS
EKG Q-T INTERVAL: 402 MS
EKG QRS DURATION: 92 MS
EKG QTC CALCULATION (BAZETT): 489 MS
EKG R AXIS: 81 DEGREES
EKG T AXIS: 52 DEGREES
EKG VENTRICULAR RATE: 89 BPM

## 2023-12-26 LAB — NONINV COLON CA DNA+OCC BLD SCRN STL QL: NEGATIVE

## 2024-01-10 ENCOUNTER — TELEPHONE (OUTPATIENT)
Dept: FAMILY MEDICINE CLINIC | Age: 64
End: 2024-01-10

## 2024-01-10 DIAGNOSIS — N18.32 STAGE 3B CHRONIC KIDNEY DISEASE (HCC): Primary | ICD-10-CM

## 2024-01-10 NOTE — TELEPHONE ENCOUNTER
Patient would like labs done sooner. She has them already placed but would like them done tomorrow and she set up an appt to discuss with ALEJANDRA next week.

## 2024-01-10 NOTE — TELEPHONE ENCOUNTER
Patient is wanting lab work ordered to check her kidney function. Patient stating that she has lab work done recently and the physician stated that she has kidney dysfunction. She would like additional labs ordered for follow up. Please advise what labs you would like to order.

## 2024-01-11 ENCOUNTER — HOSPITAL ENCOUNTER (OUTPATIENT)
Age: 64
Discharge: HOME | End: 2024-01-11
Payer: COMMERCIAL

## 2024-01-11 DIAGNOSIS — N18.32 STAGE 3B CHRONIC KIDNEY DISEASE (HCC): ICD-10-CM

## 2024-01-11 LAB
ANION GAP SERPL CALCULATED.3IONS-SCNC: 13 MMOL/L (ref 9–17)
BUN SERPL-MCNC: 24 MG/DL (ref 8–23)
BUN/CREAT SERPL: 16 (ref 9–20)
CALCIUM SERPL-MCNC: 8.8 MG/DL (ref 8.6–10.4)
CHLORIDE SERPL-SCNC: 103 MMOL/L (ref 98–107)
CO2 SERPL-SCNC: 26 MMOL/L (ref 20–31)
CREAT SERPL-MCNC: 1.5 MG/DL (ref 0.5–0.9)
GFR SERPL CREATININE-BSD FRML MDRD: 39 ML/MIN/1.73M2
GLUCOSE SERPL-MCNC: 130 MG/DL (ref 70–99)
POTASSIUM SERPL-SCNC: 4 MMOL/L (ref 3.7–5.3)
SODIUM SERPL-SCNC: 142 MMOL/L (ref 135–144)

## 2024-01-11 PROCEDURE — 80048 BASIC METABOLIC PNL TOTAL CA: CPT

## 2024-01-11 PROCEDURE — 36415 COLL VENOUS BLD VENIPUNCTURE: CPT

## 2024-01-12 ENCOUNTER — HOSPITAL ENCOUNTER (OUTPATIENT)
Age: 64
Setting detail: SPECIMEN
Discharge: HOME | End: 2024-01-12
Payer: COMMERCIAL

## 2024-01-12 DIAGNOSIS — N18.32 STAGE 3B CHRONIC KIDNEY DISEASE (HCC): ICD-10-CM

## 2024-01-12 LAB
CREAT UR-MCNC: 114 MG/DL (ref 28–217)
MICROALBUMIN UR-MCNC: <12 MG/L (ref 0–20)
MICROALBUMIN/CREAT UR-RTO: NORMAL MCG/MG CREAT (ref 0–25)

## 2024-01-12 PROCEDURE — 82570 ASSAY OF URINE CREATININE: CPT

## 2024-01-12 PROCEDURE — 82043 UR ALBUMIN QUANTITATIVE: CPT

## 2024-01-12 RX ORDER — FLUOXETINE 10 MG/1
10 CAPSULE ORAL DAILY
Qty: 30 CAPSULE | Refills: 1 | OUTPATIENT
Start: 2024-01-12

## 2024-01-12 RX ORDER — BUSPIRONE HYDROCHLORIDE 10 MG/1
10 TABLET ORAL 3 TIMES DAILY PRN
Qty: 90 TABLET | Refills: 1 | OUTPATIENT
Start: 2024-01-12

## 2024-01-17 ENCOUNTER — OFFICE VISIT (OUTPATIENT)
Dept: FAMILY MEDICINE CLINIC | Age: 64
End: 2024-01-17
Payer: COMMERCIAL

## 2024-01-17 VITALS
WEIGHT: 163 LBS | RESPIRATION RATE: 16 BRPM | DIASTOLIC BLOOD PRESSURE: 82 MMHG | HEIGHT: 66 IN | SYSTOLIC BLOOD PRESSURE: 128 MMHG | HEART RATE: 100 BPM | BODY MASS INDEX: 26.2 KG/M2 | OXYGEN SATURATION: 100 %

## 2024-01-17 DIAGNOSIS — N18.31 STAGE 3A CHRONIC KIDNEY DISEASE (HCC): Primary | ICD-10-CM

## 2024-01-17 DIAGNOSIS — M70.62 TROCHANTERIC BURSITIS OF LEFT HIP: ICD-10-CM

## 2024-01-17 DIAGNOSIS — I10 ESSENTIAL HYPERTENSION: ICD-10-CM

## 2024-01-17 PROCEDURE — 3074F SYST BP LT 130 MM HG: CPT | Performed by: NURSE PRACTITIONER

## 2024-01-17 PROCEDURE — 3079F DIAST BP 80-89 MM HG: CPT | Performed by: NURSE PRACTITIONER

## 2024-01-17 PROCEDURE — 99214 OFFICE O/P EST MOD 30 MIN: CPT | Performed by: NURSE PRACTITIONER

## 2024-01-17 RX ORDER — PREDNISONE 20 MG/1
20 TABLET ORAL 2 TIMES DAILY
Qty: 10 TABLET | Refills: 0 | Status: SHIPPED | OUTPATIENT
Start: 2024-01-17 | End: 2024-01-22

## 2024-01-17 NOTE — PATIENT INSTRUCTIONS
Hold lisinopril for two weeks, then repeat blood work to look at renal function  Prednisone twice daily for 5 days for left hip/leg pain.

## 2024-01-17 NOTE — PROGRESS NOTES
Subjective:      Patient ID: Julissa Phoenix is a 64 y.o. female coming in for   Chief Complaint   Patient presents with    Discuss Labs     Discuss lab results    Leg Pain     Left sided leg pain. Sometimes pain with sitting and with walking. Deep aching pain. Pain comes and goes.         HPI  Pt presents to office to discuss recent lab work. Concerned about her renal function.     Recent BMP 1/11/24: creatinine 1.5, GFR 39. Per past labs, pt's baseline creatinine is around 1.2-1.4 with a GFR around 40. Pt is not on any nephrotoxic medications. Does not take excessive NSAIDs. She is on lisinopril 40mg for HTN and DM.     Having left sided lateral hip pain. Pain worse with movement and flexion. Pain does not seem to radiate. No injury. Does have hx of lumbar disc herniation. No loss of bladder/bowel control, no saddle paresthesia.     Review of Systems     Objective:/82   Pulse 100   Resp 16   Ht 1.676 m (5' 6\")   Wt 73.9 kg (163 lb)   LMP 08/28/2016   SpO2 100%   BMI 26.31 kg/m²      Physical Exam  Vitals and nursing note reviewed.   Constitutional:       General: She is not in acute distress.     Appearance: Normal appearance. She is not ill-appearing.   HENT:      Head: Normocephalic.   Cardiovascular:      Rate and Rhythm: Normal rate and regular rhythm.      Heart sounds: Normal heart sounds.   Pulmonary:      Effort: Pulmonary effort is normal.      Breath sounds: Normal breath sounds.   Musculoskeletal:      Left upper leg: Tenderness present.      Right lower leg: No edema.      Left lower leg: No edema.        Legs:    Skin:     General: Skin is warm and dry.      Findings: No rash.   Neurological:      General: No focal deficit present.      Mental Status: She is alert and oriented to person, place, and time.          Assessment:      1. Stage 3a chronic kidney disease (HCC)    2. Essential hypertension    3. Trochanteric bursitis of left hip           Plan:    -reviewed past renal function with

## 2024-02-01 ENCOUNTER — HOSPITAL ENCOUNTER (OUTPATIENT)
Dept: BONE DENSITY | Age: 64
Discharge: HOME | End: 2024-02-03
Payer: COMMERCIAL

## 2024-02-01 DIAGNOSIS — R73.03 PRE-DIABETES: ICD-10-CM

## 2024-02-01 DIAGNOSIS — M81.0 OSTEOPOROSIS, UNSPECIFIED OSTEOPOROSIS TYPE, UNSPECIFIED PATHOLOGICAL FRACTURE PRESENCE: ICD-10-CM

## 2024-02-01 DIAGNOSIS — E78.5 HYPERLIPIDEMIA, UNSPECIFIED HYPERLIPIDEMIA TYPE: ICD-10-CM

## 2024-02-01 PROCEDURE — 77080 DXA BONE DENSITY AXIAL: CPT

## 2024-02-02 ENCOUNTER — HOSPITAL ENCOUNTER (OUTPATIENT)
Age: 64
Discharge: HOME | End: 2024-02-02
Payer: COMMERCIAL

## 2024-02-02 DIAGNOSIS — I10 ESSENTIAL HYPERTENSION: ICD-10-CM

## 2024-02-02 DIAGNOSIS — N18.31 STAGE 3A CHRONIC KIDNEY DISEASE (HCC): ICD-10-CM

## 2024-02-02 LAB
ANION GAP SERPL CALCULATED.3IONS-SCNC: 13 MMOL/L (ref 9–17)
BUN SERPL-MCNC: 13 MG/DL (ref 8–23)
BUN/CREAT SERPL: 13 (ref 9–20)
CALCIUM SERPL-MCNC: 8.6 MG/DL (ref 8.6–10.4)
CHLORIDE SERPL-SCNC: 103 MMOL/L (ref 98–107)
CO2 SERPL-SCNC: 27 MMOL/L (ref 20–31)
CREAT SERPL-MCNC: 1 MG/DL (ref 0.5–0.9)
GFR SERPL CREATININE-BSD FRML MDRD: >60 ML/MIN/1.73M2
GLUCOSE SERPL-MCNC: 135 MG/DL (ref 70–99)
POTASSIUM SERPL-SCNC: 3.6 MMOL/L (ref 3.7–5.3)
SODIUM SERPL-SCNC: 143 MMOL/L (ref 135–144)

## 2024-02-02 PROCEDURE — 80048 BASIC METABOLIC PNL TOTAL CA: CPT

## 2024-02-02 PROCEDURE — 36415 COLL VENOUS BLD VENIPUNCTURE: CPT

## 2024-02-09 DIAGNOSIS — M70.62 TROCHANTERIC BURSITIS OF LEFT HIP: Primary | ICD-10-CM

## 2024-02-09 RX ORDER — PREDNISONE 20 MG/1
20 TABLET ORAL 2 TIMES DAILY
Qty: 10 TABLET | Refills: 0 | Status: SHIPPED | OUTPATIENT
Start: 2024-02-09 | End: 2024-02-14

## 2024-02-16 ENCOUNTER — OFFICE VISIT (OUTPATIENT)
Dept: PRIMARY CARE CLINIC | Age: 64
End: 2024-02-16
Payer: COMMERCIAL

## 2024-02-16 VITALS
WEIGHT: 167.2 LBS | DIASTOLIC BLOOD PRESSURE: 88 MMHG | TEMPERATURE: 97.1 F | OXYGEN SATURATION: 97 % | BODY MASS INDEX: 26.99 KG/M2 | HEART RATE: 98 BPM | SYSTOLIC BLOOD PRESSURE: 124 MMHG

## 2024-02-16 DIAGNOSIS — F41.9 ANXIETY: ICD-10-CM

## 2024-02-16 DIAGNOSIS — T50.905A ADVERSE EFFECT OF DRUG, INITIAL ENCOUNTER: Primary | ICD-10-CM

## 2024-02-16 PROBLEM — M81.0 OSTEOPOROSIS: Status: ACTIVE | Noted: 2023-12-15

## 2024-02-16 PROBLEM — K21.9 GASTROESOPHAGEAL REFLUX DISEASE: Status: ACTIVE | Noted: 2022-10-14

## 2024-02-16 PROBLEM — F32.A DEPRESSIVE DISORDER: Status: ACTIVE | Noted: 2022-10-14

## 2024-02-16 PROCEDURE — 99213 OFFICE O/P EST LOW 20 MIN: CPT | Performed by: FAMILY MEDICINE

## 2024-02-16 PROCEDURE — 3074F SYST BP LT 130 MM HG: CPT | Performed by: FAMILY MEDICINE

## 2024-02-16 PROCEDURE — 3079F DIAST BP 80-89 MM HG: CPT | Performed by: FAMILY MEDICINE

## 2024-02-16 RX ORDER — LORAZEPAM 0.5 MG/1
.25-.5 TABLET ORAL EVERY 8 HOURS PRN
Qty: 4 TABLET | Refills: 0 | Status: SHIPPED | OUTPATIENT
Start: 2024-02-16 | End: 2024-03-17

## 2024-02-16 NOTE — PROGRESS NOTES
[facility]             [address] [address]                        Telephone [phone]             Fax [phone]       Julissa Phoenix  :  1960  Age:  64 y.o.   MRN:  [id number]  Date of visit:  2024     Assessment and Plan:    1. Adverse effect of drug, initial encounter  2. Anxiety  I discussed with the patient that the symptoms that she is experiencing may be due to taking two of the Buspar close together.  Ativan was prescribed:  - LORazepam (ATIVAN) 0.5 MG tablet; Take 0.5-1 tablets by mouth every 8 hours as needed for Anxiety for up to 30 days. Max Daily Amount: 1.5 mg  Dispense: 4 tablet; Refill: 0    She was advised not to drive or operate machinery while taking Ativan.  She was also advised not to take this medication in combination with alcohol.        Subjective:    Julissa Phoenix is a 64 y.o. female who presents to [facility] today (2024) for evaluation of:  Anxiety (Took a buspar at 430 then again in 2 hours still feels like she's going to crawl out of her skin)      She states that she woke up this morning at 4:30 and she was feeling anxious, so she took a dose of Buspar.  When she got up at 6:30 she took another dose of Buspar before she realized that she had just taken a dose a couple of hours earlier.  Since then she has had a dry mouth, and she feels \"like I'm going to crawl out of my skin.\"     She has the following problem list:  Patient Active Problem List   Diagnosis    Depression    GERD (gastroesophageal reflux disease)    Tobacco abuse    Type 2 diabetes mellitus without complication (HCC)    Left cataract    Alcohol abuse    Obstructive sleep apnea syndrome    Mixed hyperlipidemia    Essential hypertension    Chronic sinusitis    Polyneuropathy associated with underlying disease (HCC)    Peripheral arterial disease (HCC)    Dry eye    Nevus of choroid of left eye    PCO

## 2024-02-16 NOTE — PATIENT INSTRUCTIONS
Warning:  One or more of the medications that you have been prescribed may cause drowsiness and impair your ability to operate vehicles or machinery.  Do not drive or operate machinery while taking Ativan.  Do not use in combination with alcohol.

## 2024-02-20 DIAGNOSIS — F41.9 ANXIETY: ICD-10-CM

## 2024-02-20 NOTE — TELEPHONE ENCOUNTER
Per [facility], last fill 02/16/2024, quantity 4 for 30 days.   Julissa called requesting a refill of the below medication which has been pended for you: Not sure how many you would want to disp, if you would prefer to do a UC follow up please advise.     Requested Prescriptions     Pending Prescriptions Disp Refills    LORazepam (ATIVAN) 0.5 MG tablet 30 tablet 0     Sig: Take 0.5-1 tablets by mouth every 8 hours as needed for Anxiety for up to 30 days. Max Daily Amount: 1.5 mg       Last Appointment Date: 1/17/2024  Next Appointment Date: 5/22/2024    Allergies   Allergen Reactions    Gabapentin Anaphylaxis    Lyrica [Pregabalin]     Wellbutrin [Bupropion] Other (See Comments)     Seeing double    Ciprofloxacin Other (See Comments)     Stomach pain    Keflex [Cephalexin] Other (See Comments)     Stomach pain

## 2024-02-20 NOTE — TELEPHONE ENCOUNTER
Pt states she was seen in UC and Ativan and pt calling stating it's working well and she would like a refill.

## 2024-02-21 RX ORDER — LORAZEPAM 0.5 MG/1
.25-.5 TABLET ORAL EVERY 8 HOURS PRN
Qty: 30 TABLET | Refills: 0 | Status: SHIPPED | OUTPATIENT
Start: 2024-02-21 | End: 2024-03-22

## 2024-03-11 ENCOUNTER — HOSPITAL ENCOUNTER (OUTPATIENT)
Age: 64
Discharge: HOME | End: 2024-03-11
Payer: COMMERCIAL

## 2024-03-11 LAB
25(OH)D3 SERPL-MCNC: 39.1 NG/ML (ref 30–100)
CALCIUM SERPL-MCNC: 8.7 MG/DL (ref 8.6–10.4)
PTH-INTACT SERPL-MCNC: 38 PG/ML (ref 15–65)

## 2024-03-11 PROCEDURE — 82310 ASSAY OF CALCIUM: CPT

## 2024-03-11 PROCEDURE — 36415 COLL VENOUS BLD VENIPUNCTURE: CPT

## 2024-03-11 PROCEDURE — 82306 VITAMIN D 25 HYDROXY: CPT

## 2024-03-11 PROCEDURE — 83970 ASSAY OF PARATHORMONE: CPT

## 2024-03-14 LAB
MISCELLANEOUS LAB TEST RESULT: NORMAL
TEST NAME: NORMAL

## 2024-03-15 ENCOUNTER — HOSPITAL ENCOUNTER (OUTPATIENT)
Dept: MAMMOGRAPHY | Age: 64
Discharge: HOME | End: 2024-03-15
Payer: COMMERCIAL

## 2024-03-15 VITALS — HEIGHT: 66 IN | BODY MASS INDEX: 26.52 KG/M2 | WEIGHT: 165 LBS

## 2024-03-15 DIAGNOSIS — Z12.31 VISIT FOR SCREENING MAMMOGRAM: ICD-10-CM

## 2024-03-15 PROCEDURE — 77063 BREAST TOMOSYNTHESIS BI: CPT

## 2024-03-21 ENCOUNTER — OFFICE VISIT (OUTPATIENT)
Dept: FAMILY MEDICINE CLINIC | Age: 64
End: 2024-03-21
Payer: COMMERCIAL

## 2024-03-21 VITALS
HEART RATE: 90 BPM | RESPIRATION RATE: 16 BRPM | OXYGEN SATURATION: 100 % | DIASTOLIC BLOOD PRESSURE: 82 MMHG | SYSTOLIC BLOOD PRESSURE: 132 MMHG | BODY MASS INDEX: 26.93 KG/M2 | WEIGHT: 167.6 LBS | HEIGHT: 66 IN

## 2024-03-21 DIAGNOSIS — M70.62 TROCHANTERIC BURSITIS OF LEFT HIP: Primary | ICD-10-CM

## 2024-03-21 PROCEDURE — 99213 OFFICE O/P EST LOW 20 MIN: CPT | Performed by: NURSE PRACTITIONER

## 2024-03-21 PROCEDURE — 20610 DRAIN/INJ JOINT/BURSA W/O US: CPT | Performed by: NURSE PRACTITIONER

## 2024-03-21 PROCEDURE — 3075F SYST BP GE 130 - 139MM HG: CPT | Performed by: NURSE PRACTITIONER

## 2024-03-21 PROCEDURE — 3079F DIAST BP 80-89 MM HG: CPT | Performed by: NURSE PRACTITIONER

## 2024-03-21 RX ORDER — TRIAMCINOLONE ACETONIDE 40 MG/ML
40 INJECTION, SUSPENSION INTRA-ARTICULAR; INTRAMUSCULAR ONCE
Status: COMPLETED | OUTPATIENT
Start: 2024-03-21 | End: 2024-03-21

## 2024-03-21 RX ORDER — ALENDRONATE SODIUM 70 MG/1
TABLET ORAL
COMMUNITY
Start: 2022-01-04

## 2024-03-21 RX ORDER — ESCITALOPRAM OXALATE 10 MG/1
TABLET ORAL
COMMUNITY
Start: 2021-09-23 | End: 2024-03-21

## 2024-03-21 RX ADMIN — TRIAMCINOLONE ACETONIDE 40 MG: 40 INJECTION, SUSPENSION INTRA-ARTICULAR; INTRAMUSCULAR at 12:29

## 2024-03-21 ASSESSMENT — PATIENT HEALTH QUESTIONNAIRE - PHQ9
5. POOR APPETITE OR OVEREATING: NOT AT ALL
SUM OF ALL RESPONSES TO PHQ QUESTIONS 1-9: 0
9. THOUGHTS THAT YOU WOULD BE BETTER OFF DEAD, OR OF HURTING YOURSELF: NOT AT ALL
1. LITTLE INTEREST OR PLEASURE IN DOING THINGS: NOT AT ALL
4. FEELING TIRED OR HAVING LITTLE ENERGY: NOT AT ALL
SUM OF ALL RESPONSES TO PHQ QUESTIONS 1-9: 0
6. FEELING BAD ABOUT YOURSELF - OR THAT YOU ARE A FAILURE OR HAVE LET YOURSELF OR YOUR FAMILY DOWN: NOT AT ALL
10. IF YOU CHECKED OFF ANY PROBLEMS, HOW DIFFICULT HAVE THESE PROBLEMS MADE IT FOR YOU TO DO YOUR WORK, TAKE CARE OF THINGS AT HOME, OR GET ALONG WITH OTHER PEOPLE: NOT DIFFICULT AT ALL
SUM OF ALL RESPONSES TO PHQ QUESTIONS 1-9: 0
3. TROUBLE FALLING OR STAYING ASLEEP: NOT AT ALL
8. MOVING OR SPEAKING SO SLOWLY THAT OTHER PEOPLE COULD HAVE NOTICED. OR THE OPPOSITE, BEING SO FIGETY OR RESTLESS THAT YOU HAVE BEEN MOVING AROUND A LOT MORE THAN USUAL: NOT AT ALL
SUM OF ALL RESPONSES TO PHQ9 QUESTIONS 1 & 2: 0
2. FEELING DOWN, DEPRESSED OR HOPELESS: NOT AT ALL
SUM OF ALL RESPONSES TO PHQ QUESTIONS 1-9: 0
7. TROUBLE CONCENTRATING ON THINGS, SUCH AS READING THE NEWSPAPER OR WATCHING TELEVISION: NOT AT ALL

## 2024-03-21 NOTE — PROGRESS NOTES
Subjective:      Patient ID: Julissa Phoenix is a 64 y.o. female coming in for   Chief Complaint   Patient presents with    Bursitis     Left sided hip and low back pain. Pain/discomfort when sitting for too long. She was given a steroid at last visit for two rounds and it did help a little bit.        Ongoing left sided lateral hip pain. Pain worse with movement and flexion. Pain does not seem to radiate. No injury. Does have hx of lumbar disc herniation. No loss of bladder/bowel control, no saddle paresthesia. Did have some relief with prednisone burst after 1/17/24 visit, but states the pain is still present.     Xray of bilateral hips 12/6/22:  FINDINGS:  No acute osseous abnormality.  Pelvis intact.  Hip alignment anatomic.  Hip  joint and SI joint spaces preserved.  Soft tissues unremarkable.           Review of Systems     Objective:/82   Pulse 90   Resp 16   Ht 1.676 m (5' 6\")   Wt 76 kg (167 lb 9.6 oz)   LMP 08/28/2016   SpO2 100%   BMI 27.05 kg/m²      Physical Exam  Vitals and nursing note reviewed.   Constitutional:       General: She is not in acute distress.     Appearance: Normal appearance. She is not ill-appearing.   HENT:      Head: Normocephalic.   Pulmonary:      Effort: Pulmonary effort is normal.   Musculoskeletal:         General: No deformity.      Cervical back: Neck supple.      Left upper leg: Tenderness present.      Right lower leg: No edema.      Left lower leg: No edema.        Legs:    Skin:     General: Skin is warm and dry.      Findings: No bruising, erythema or rash.   Neurological:      General: No focal deficit present.      Mental Status: She is alert and oriented to person, place, and time.        Assessment:      1. Trochanteric bursitis of left hip           Plan:    -verbal consent given for bursitis injection  Area of maximal tenderness over lateral left hip identified. Area cleansed using alcohol swab. Anesthesia provided using ethyl chloride spray. Area

## 2024-04-10 DIAGNOSIS — F41.9 ANXIETY: ICD-10-CM

## 2024-04-10 RX ORDER — LORAZEPAM 0.5 MG/1
.25-.5 TABLET ORAL EVERY 8 HOURS PRN
Qty: 30 TABLET | Refills: 0 | Status: SHIPPED | OUTPATIENT
Start: 2024-04-10 | End: 2024-05-10

## 2024-04-10 NOTE — TELEPHONE ENCOUNTER
Julissa called requesting a refill of the below medication which has been pended for you:     Requested Prescriptions     Pending Prescriptions Disp Refills    LORazepam (ATIVAN) 0.5 MG tablet 30 tablet 0     Sig: Take 0.5-1 tablets by mouth every 8 hours as needed for Anxiety for up to 30 days. Max Daily Amount: 1.5 mg       Last Appointment Date: 3/21/2024  Next Appointment Date: 5/23/2024    Allergies   Allergen Reactions    Gabapentin Anaphylaxis    Lyrica [Pregabalin]     Wellbutrin [Bupropion] Other (See Comments)     Seeing double    Ciprofloxacin Other (See Comments)     Stomach pain    Keflex [Cephalexin] Other (See Comments)     Stomach pain

## 2024-04-18 ENCOUNTER — OFFICE VISIT (OUTPATIENT)
Dept: FAMILY MEDICINE CLINIC | Age: 64
End: 2024-04-18
Payer: COMMERCIAL

## 2024-04-18 ENCOUNTER — HOSPITAL ENCOUNTER (OUTPATIENT)
Dept: GENERAL RADIOLOGY | Age: 64
Discharge: HOME | End: 2024-04-20
Payer: COMMERCIAL

## 2024-04-18 VITALS
RESPIRATION RATE: 18 BRPM | BODY MASS INDEX: 26.23 KG/M2 | WEIGHT: 163.2 LBS | DIASTOLIC BLOOD PRESSURE: 78 MMHG | OXYGEN SATURATION: 97 % | SYSTOLIC BLOOD PRESSURE: 128 MMHG | HEART RATE: 101 BPM | HEIGHT: 66 IN

## 2024-04-18 DIAGNOSIS — M25.811 IMPINGEMENT OF RIGHT SHOULDER: ICD-10-CM

## 2024-04-18 DIAGNOSIS — M25.811 IMPINGEMENT OF RIGHT SHOULDER: Primary | ICD-10-CM

## 2024-04-18 PROCEDURE — 3074F SYST BP LT 130 MM HG: CPT | Performed by: NURSE PRACTITIONER

## 2024-04-18 PROCEDURE — 3078F DIAST BP <80 MM HG: CPT | Performed by: NURSE PRACTITIONER

## 2024-04-18 PROCEDURE — 99213 OFFICE O/P EST LOW 20 MIN: CPT | Performed by: NURSE PRACTITIONER

## 2024-04-18 PROCEDURE — 73030 X-RAY EXAM OF SHOULDER: CPT

## 2024-04-18 RX ORDER — PREDNISONE 20 MG/1
20 TABLET ORAL 2 TIMES DAILY
Qty: 10 TABLET | Refills: 0 | Status: SHIPPED | OUTPATIENT
Start: 2024-04-18 | End: 2024-04-23

## 2024-04-18 ASSESSMENT — PATIENT HEALTH QUESTIONNAIRE - PHQ9
SUM OF ALL RESPONSES TO PHQ9 QUESTIONS 1 & 2: 0
SUM OF ALL RESPONSES TO PHQ QUESTIONS 1-9: 0
10. IF YOU CHECKED OFF ANY PROBLEMS, HOW DIFFICULT HAVE THESE PROBLEMS MADE IT FOR YOU TO DO YOUR WORK, TAKE CARE OF THINGS AT HOME, OR GET ALONG WITH OTHER PEOPLE: NOT DIFFICULT AT ALL
SUM OF ALL RESPONSES TO PHQ QUESTIONS 1-9: 0
5. POOR APPETITE OR OVEREATING: NOT AT ALL
6. FEELING BAD ABOUT YOURSELF - OR THAT YOU ARE A FAILURE OR HAVE LET YOURSELF OR YOUR FAMILY DOWN: NOT AT ALL
SUM OF ALL RESPONSES TO PHQ QUESTIONS 1-9: 0
9. THOUGHTS THAT YOU WOULD BE BETTER OFF DEAD, OR OF HURTING YOURSELF: NOT AT ALL
4. FEELING TIRED OR HAVING LITTLE ENERGY: NOT AT ALL
7. TROUBLE CONCENTRATING ON THINGS, SUCH AS READING THE NEWSPAPER OR WATCHING TELEVISION: NOT AT ALL
3. TROUBLE FALLING OR STAYING ASLEEP: NOT AT ALL
2. FEELING DOWN, DEPRESSED OR HOPELESS: NOT AT ALL
SUM OF ALL RESPONSES TO PHQ QUESTIONS 1-9: 0
8. MOVING OR SPEAKING SO SLOWLY THAT OTHER PEOPLE COULD HAVE NOTICED. OR THE OPPOSITE, BEING SO FIGETY OR RESTLESS THAT YOU HAVE BEEN MOVING AROUND A LOT MORE THAN USUAL: NOT AT ALL
1. LITTLE INTEREST OR PLEASURE IN DOING THINGS: NOT AT ALL

## 2024-04-19 DIAGNOSIS — M75.31 CALCIFIC TENDINITIS OF RIGHT SHOULDER: Primary | ICD-10-CM

## 2024-04-22 ENCOUNTER — TELEPHONE (OUTPATIENT)
Dept: FAMILY MEDICINE CLINIC | Age: 64
End: 2024-04-22

## 2024-05-14 ENCOUNTER — HOSPITAL ENCOUNTER (OUTPATIENT)
Age: 64
Discharge: HOME | End: 2024-05-14
Payer: COMMERCIAL

## 2024-05-14 DIAGNOSIS — E11.9 TYPE 2 DIABETES MELLITUS WITHOUT COMPLICATION, UNSPECIFIED WHETHER LONG TERM INSULIN USE (HCC): ICD-10-CM

## 2024-05-14 DIAGNOSIS — I10 ESSENTIAL HYPERTENSION: ICD-10-CM

## 2024-05-14 LAB
ALBUMIN SERPL-MCNC: 5 G/DL (ref 3.5–5.2)
ALBUMIN/GLOB SERPL: 2 {RATIO} (ref 1–2.5)
ALP SERPL-CCNC: 137 U/L (ref 35–104)
ALT SERPL-CCNC: 23 U/L (ref 5–33)
ANION GAP SERPL CALCULATED.3IONS-SCNC: 12 MMOL/L (ref 9–17)
AST SERPL-CCNC: 22 U/L
BASOPHILS # BLD: 0.06 K/UL (ref 0–0.2)
BASOPHILS NFR BLD: 1 % (ref 0–2)
BILIRUB SERPL-MCNC: 0.3 MG/DL (ref 0.3–1.2)
BUN SERPL-MCNC: 13 MG/DL (ref 8–23)
BUN/CREAT SERPL: 11 (ref 9–20)
CALCIUM SERPL-MCNC: 9.9 MG/DL (ref 8.6–10.4)
CHLORIDE SERPL-SCNC: 100 MMOL/L (ref 98–107)
CHOLEST SERPL-MCNC: 187 MG/DL (ref 0–199)
CHOLESTEROL/HDL RATIO: 3
CO2 SERPL-SCNC: 29 MMOL/L (ref 20–31)
CREAT SERPL-MCNC: 1.2 MG/DL (ref 0.5–0.9)
CREAT UR-MCNC: 265 MG/DL (ref 28–217)
EOSINOPHIL # BLD: 0.05 K/UL (ref 0–0.44)
EOSINOPHILS RELATIVE PERCENT: 1 % (ref 1–4)
ERYTHROCYTE [DISTWIDTH] IN BLOOD BY AUTOMATED COUNT: 12.8 % (ref 11.8–14.4)
EST. AVERAGE GLUCOSE BLD GHB EST-MCNC: 134 MG/DL
GFR, ESTIMATED: 51 ML/MIN/1.73M2
GLUCOSE SERPL-MCNC: 156 MG/DL (ref 70–99)
HBA1C MFR BLD: 6.3 % (ref 4–6)
HCT VFR BLD AUTO: 43.3 % (ref 36.3–47.1)
HDLC SERPL-MCNC: 65 MG/DL
HGB BLD-MCNC: 14.9 G/DL (ref 11.9–15.1)
IMM GRANULOCYTES # BLD AUTO: 0.03 K/UL (ref 0–0.3)
IMM GRANULOCYTES NFR BLD: 0 %
LDLC SERPL CALC-MCNC: 98 MG/DL (ref 0–100)
LYMPHOCYTES NFR BLD: 1.53 K/UL (ref 1.1–3.7)
LYMPHOCYTES RELATIVE PERCENT: 15 % (ref 24–43)
MCH RBC QN AUTO: 31.9 PG (ref 25.2–33.5)
MCHC RBC AUTO-ENTMCNC: 34.4 G/DL (ref 25.2–33.5)
MCV RBC AUTO: 92.7 FL (ref 82.6–102.9)
MICROALBUMIN UR-MCNC: 23 MG/L (ref 0–20)
MICROALBUMIN/CREAT UR-RTO: 9 MCG/MG CREAT (ref 0–25)
MONOCYTES NFR BLD: 0.69 K/UL (ref 0.1–1.2)
MONOCYTES NFR BLD: 7 % (ref 3–12)
NEUTROPHILS NFR BLD: 76 % (ref 36–65)
NEUTS SEG NFR BLD: 8.08 K/UL (ref 1.5–8.1)
NRBC BLD-RTO: 0 PER 100 WBC
PLATELET # BLD AUTO: 350 K/UL (ref 138–453)
PMV BLD AUTO: 10 FL (ref 8.1–13.5)
POTASSIUM SERPL-SCNC: 4.1 MMOL/L (ref 3.7–5.3)
PROT SERPL-MCNC: 7.5 G/DL (ref 6.4–8.3)
RBC # BLD AUTO: 4.67 M/UL (ref 3.95–5.11)
SODIUM SERPL-SCNC: 141 MMOL/L (ref 135–144)
TRIGL SERPL-MCNC: 121 MG/DL
VLDLC SERPL CALC-MCNC: 24 MG/DL
WBC OTHER # BLD: 10.4 K/UL (ref 3.5–11.3)

## 2024-05-14 PROCEDURE — 82570 ASSAY OF URINE CREATININE: CPT

## 2024-05-14 PROCEDURE — 85025 COMPLETE CBC W/AUTO DIFF WBC: CPT

## 2024-05-14 PROCEDURE — 36415 COLL VENOUS BLD VENIPUNCTURE: CPT

## 2024-05-14 PROCEDURE — 80061 LIPID PANEL: CPT

## 2024-05-14 PROCEDURE — 82043 UR ALBUMIN QUANTITATIVE: CPT

## 2024-05-14 PROCEDURE — 80053 COMPREHEN METABOLIC PANEL: CPT

## 2024-05-14 PROCEDURE — 83036 HEMOGLOBIN GLYCOSYLATED A1C: CPT

## 2024-05-16 ENCOUNTER — HOSPITAL ENCOUNTER (OUTPATIENT)
Dept: GENERAL RADIOLOGY | Age: 64
Discharge: HOME | End: 2024-05-18
Payer: COMMERCIAL

## 2024-05-16 ENCOUNTER — OFFICE VISIT (OUTPATIENT)
Dept: FAMILY MEDICINE CLINIC | Age: 64
End: 2024-05-16
Payer: COMMERCIAL

## 2024-05-16 VITALS
HEART RATE: 88 BPM | DIASTOLIC BLOOD PRESSURE: 72 MMHG | OXYGEN SATURATION: 100 % | HEIGHT: 66 IN | SYSTOLIC BLOOD PRESSURE: 112 MMHG | WEIGHT: 161.6 LBS | RESPIRATION RATE: 16 BRPM | BODY MASS INDEX: 25.97 KG/M2

## 2024-05-16 DIAGNOSIS — I10 ESSENTIAL HYPERTENSION: ICD-10-CM

## 2024-05-16 DIAGNOSIS — M25.552 LEFT HIP PAIN: Primary | ICD-10-CM

## 2024-05-16 DIAGNOSIS — M25.552 LEFT HIP PAIN: ICD-10-CM

## 2024-05-16 DIAGNOSIS — K21.9 GASTROESOPHAGEAL REFLUX DISEASE WITHOUT ESOPHAGITIS: ICD-10-CM

## 2024-05-16 DIAGNOSIS — E78.2 MIXED HYPERLIPIDEMIA: ICD-10-CM

## 2024-05-16 PROCEDURE — 73521 X-RAY EXAM HIPS BI 2 VIEWS: CPT

## 2024-05-16 PROCEDURE — 3078F DIAST BP <80 MM HG: CPT | Performed by: NURSE PRACTITIONER

## 2024-05-16 PROCEDURE — 99214 OFFICE O/P EST MOD 30 MIN: CPT | Performed by: NURSE PRACTITIONER

## 2024-05-16 PROCEDURE — 3074F SYST BP LT 130 MM HG: CPT | Performed by: NURSE PRACTITIONER

## 2024-05-16 RX ORDER — ESCITALOPRAM OXALATE 10 MG/1
TABLET ORAL
COMMUNITY
Start: 2024-04-24

## 2024-05-16 RX ORDER — HYDROCHLOROTHIAZIDE 12.5 MG/1
CAPSULE, GELATIN COATED ORAL
Qty: 90 CAPSULE | Refills: 3 | Status: SHIPPED | OUTPATIENT
Start: 2024-05-16

## 2024-05-16 RX ORDER — PREDNISONE 20 MG/1
20 TABLET ORAL 2 TIMES DAILY
Qty: 10 TABLET | Refills: 0 | Status: SHIPPED | OUTPATIENT
Start: 2024-05-16 | End: 2024-05-21

## 2024-05-16 RX ORDER — ATORVASTATIN CALCIUM 20 MG/1
20 TABLET, FILM COATED ORAL DAILY
Qty: 90 TABLET | Refills: 3 | Status: SHIPPED | OUTPATIENT
Start: 2024-05-16

## 2024-05-16 ASSESSMENT — PATIENT HEALTH QUESTIONNAIRE - PHQ9
6. FEELING BAD ABOUT YOURSELF - OR THAT YOU ARE A FAILURE OR HAVE LET YOURSELF OR YOUR FAMILY DOWN: NOT AT ALL
1. LITTLE INTEREST OR PLEASURE IN DOING THINGS: NOT AT ALL
4. FEELING TIRED OR HAVING LITTLE ENERGY: NOT AT ALL
SUM OF ALL RESPONSES TO PHQ QUESTIONS 1-9: 0
2. FEELING DOWN, DEPRESSED OR HOPELESS: NOT AT ALL
10. IF YOU CHECKED OFF ANY PROBLEMS, HOW DIFFICULT HAVE THESE PROBLEMS MADE IT FOR YOU TO DO YOUR WORK, TAKE CARE OF THINGS AT HOME, OR GET ALONG WITH OTHER PEOPLE: NOT DIFFICULT AT ALL
7. TROUBLE CONCENTRATING ON THINGS, SUCH AS READING THE NEWSPAPER OR WATCHING TELEVISION: NOT AT ALL
8. MOVING OR SPEAKING SO SLOWLY THAT OTHER PEOPLE COULD HAVE NOTICED. OR THE OPPOSITE, BEING SO FIGETY OR RESTLESS THAT YOU HAVE BEEN MOVING AROUND A LOT MORE THAN USUAL: NOT AT ALL
3. TROUBLE FALLING OR STAYING ASLEEP: NOT AT ALL
SUM OF ALL RESPONSES TO PHQ QUESTIONS 1-9: 0
9. THOUGHTS THAT YOU WOULD BE BETTER OFF DEAD, OR OF HURTING YOURSELF: NOT AT ALL
SUM OF ALL RESPONSES TO PHQ QUESTIONS 1-9: 0
SUM OF ALL RESPONSES TO PHQ9 QUESTIONS 1 & 2: 0
5. POOR APPETITE OR OVEREATING: NOT AT ALL
SUM OF ALL RESPONSES TO PHQ QUESTIONS 1-9: 0

## 2024-05-16 NOTE — PROGRESS NOTES
Subjective:      Patient ID: Julissa Phoenix is a 64 y.o. female coming in for   Chief Complaint   Patient presents with    Hip Pain     Left hip pain. Had a steroid injection on 3/21/24. Can't lay on either side.    Hypertension     6 month f/u        HPI  Left hip pain, unable to lay on either side due to pain. Pain was worse with long walking. No crepitus. No recent injury of hip. Pain worse on anterior and lateral aspect of left hip.     HTN stable today. Current regimen hctz 12.5mg daily. Pt denies any associated symptoms. Due for routine labs prior to next visit.     Review of Systems   Constitutional:  Negative for fatigue and unexpected weight change.   Respiratory:  Negative for shortness of breath.    Cardiovascular:  Negative for chest pain and leg swelling.   Gastrointestinal:  Negative for abdominal pain, blood in stool, constipation and diarrhea.   Musculoskeletal:  Positive for arthralgias.   Skin:  Negative for rash.   Neurological:  Negative for dizziness, light-headedness and headaches.        Objective:/72   Pulse 88   Resp 16   Ht 1.676 m (5' 6\")   Wt 73.3 kg (161 lb 9.6 oz)   LMP 08/28/2016   SpO2 100%   BMI 26.08 kg/m²      Physical Exam  Vitals and nursing note reviewed.   Constitutional:       General: She is not in acute distress.     Appearance: Normal appearance. She is not ill-appearing.   HENT:      Head: Normocephalic.   Cardiovascular:      Rate and Rhythm: Normal rate and regular rhythm.      Heart sounds: Normal heart sounds.   Pulmonary:      Effort: Pulmonary effort is normal.      Breath sounds: Normal breath sounds.   Musculoskeletal:      Left hip: Tenderness (mild on lateral aspect) present. No deformity or crepitus. Normal range of motion. Normal strength.      Right lower leg: No edema.      Left lower leg: No edema.   Skin:     General: Skin is warm and dry.      Findings: No rash.   Neurological:      General: No focal deficit present.      Mental Status: She is

## 2024-05-18 ASSESSMENT — ENCOUNTER SYMPTOMS
CONSTIPATION: 0
SHORTNESS OF BREATH: 0
DIARRHEA: 0
BLOOD IN STOOL: 0
ABDOMINAL PAIN: 0

## 2024-06-01 NOTE — PROGRESS NOTES
Subjective:      Patient ID: Julissa Phoenix is a 64 y.o. female coming in for   Chief Complaint   Patient presents with    Arm Problem     Right arm pain. Started on Monday of this week. Can't lift her arm over her shoulder. Pain in the upper arm. No known injury.        Shoulder Pain   Pain location: right shoulder. This is a new problem. The current episode started in the past 7 days. There has been no history of extremity trauma. The quality of the pain is described as aching. Associated symptoms include a limited range of motion (unable to abduct beyond 70degrees). Pertinent negatives include no inability to bear weight. She has tried NSAIDS for the symptoms. The treatment provided no relief.       Review of Systems     Objective:/78   Pulse (!) 101   Resp 18   Ht 1.676 m (5' 6\")   Wt 74 kg (163 lb 3.2 oz)   LMP 08/28/2016   SpO2 97%   BMI 26.34 kg/m²      Physical Exam  Vitals and nursing note reviewed.   Constitutional:       General: She is not in acute distress.     Appearance: Normal appearance. She is not ill-appearing.   HENT:      Head: Normocephalic.   Pulmonary:      Effort: Pulmonary effort is normal.   Musculoskeletal:      Right shoulder: Tenderness present. No swelling or deformity. Decreased range of motion. Decreased strength. Normal pulse.   Skin:     General: Skin is warm.      Findings: No bruising or erythema.   Neurological:      General: No focal deficit present.      Mental Status: She is alert and oriented to person, place, and time.          Assessment:      1. Impingement of right shoulder           Plan:    -xray shoulder ordered  -prednisone for inflammation  -will call with results     Orders Placed This Encounter   Procedures    XR SHOULDER RIGHT (MIN 2 VIEWS)     Standing Status:   Future     Number of Occurrences:   1     Standing Expiration Date:   4/18/2025     Order Specific Question:   Reason for exam:     Answer:   unable to abduct past 70 degrees      Outpatient  No

## 2024-07-19 ENCOUNTER — TELEPHONE (OUTPATIENT)
Dept: FAMILY MEDICINE CLINIC | Age: 64
End: 2024-07-19

## 2024-07-19 DIAGNOSIS — M25.552 LEFT HIP PAIN: Primary | ICD-10-CM

## 2024-07-19 NOTE — TELEPHONE ENCOUNTER
Pt called and would like a referral placed to [facility] ortho due to ongoing left hip pain and was seen by Joshua.  Julissa was told if she is still having pain and concerns that Joshua  could put in  a referral in to ortho for her and she would like to do that now.

## 2024-09-04 ENCOUNTER — OFFICE VISIT (OUTPATIENT)
Dept: ORTHOPEDIC SURGERY | Age: 64
End: 2024-09-04
Payer: COMMERCIAL

## 2024-09-04 VITALS
SYSTOLIC BLOOD PRESSURE: 132 MMHG | HEART RATE: 72 BPM | WEIGHT: 174 LBS | HEIGHT: 66 IN | DIASTOLIC BLOOD PRESSURE: 82 MMHG | BODY MASS INDEX: 27.97 KG/M2

## 2024-09-04 DIAGNOSIS — M16.12 PRIMARY OSTEOARTHRITIS OF LEFT HIP: Primary | ICD-10-CM

## 2024-09-04 PROCEDURE — 3075F SYST BP GE 130 - 139MM HG: CPT | Performed by: STUDENT IN AN ORGANIZED HEALTH CARE EDUCATION/TRAINING PROGRAM

## 2024-09-04 PROCEDURE — 99213 OFFICE O/P EST LOW 20 MIN: CPT | Performed by: STUDENT IN AN ORGANIZED HEALTH CARE EDUCATION/TRAINING PROGRAM

## 2024-09-04 PROCEDURE — 3079F DIAST BP 80-89 MM HG: CPT | Performed by: STUDENT IN AN ORGANIZED HEALTH CARE EDUCATION/TRAINING PROGRAM

## 2024-09-05 NOTE — PROGRESS NOTES
[facility], [facility]THOPEDICS A DEPARTMENT OF [facility]  [address] [facility] [address]  Dept: [phone]    Ambulatory Orthopedic Consult    CHIEF COMPLAINT:    Chief Complaint   Patient presents with    Hip Pain     Left hip pain       HISTORY OF PRESENT ILLNESS:      The patient is a 64 y.o.  female who is being seen for consultation and evaluation of left hip pain. Pain ongoing for several months. Has tried GT injections with no relief. Pain in groin. No falls or injury.     Past Medical History:    Past Medical History:   Diagnosis Date    Cervicogenic headache     Chronic sinusitis     Conductive hearing loss     Degenerative arthritis of cervical spine     Depression     Elevated fasting glucose     Epigastric abdominal pain     GERD (gastroesophageal reflux disease)     History of cervical dysplasia 1987    LASER    History of methamphetamine abuse (HCC)     (Quit 6-2001)    LSIL (low grade squamous intraepithelial lesion) on Pap smear 2002    Cryo    Tobacco abuse        Past Surgical History:    Past Surgical History:   Procedure Laterality Date    CATARACT REMOVAL Bilateral     CATARACT REMOVAL WITH IMPLANT Left 3/22/2016    Dr. Potter, [facility]    CERVIX LESION DESTRUCTION      COLONOSCOPY  03/03/2017    Dr. Kathleen, [facility],polyp    OTHER SURGICAL HISTORY  03/10/1987    Laser conization of cervix (Hx of moderate dysplasia on Pap test).    UPPER GASTROINTESTINAL ENDOSCOPY  05/02/2013    Mild gastritis.       Current Medications:   Current Outpatient Medications   Medication Sig Dispense Refill    escitalopram (LEXAPRO) 10 MG tablet       atorvastatin (LIPITOR) 20 MG tablet Take 1 tablet by mouth daily 90 tablet 3    hydroCHLOROthiazide 12.5 MG capsule TAKE 1 CAPSULE BY MOUTH IN THE  MORNING 90 capsule 3    alendronate (FOSAMAX) 70 MG tablet alendronate (FOSAMAX) 70 mg tablet

## 2024-09-11 ENCOUNTER — OFFICE VISIT (OUTPATIENT)
Dept: FAMILY MEDICINE CLINIC | Age: 64
End: 2024-09-11
Payer: COMMERCIAL

## 2024-09-11 ENCOUNTER — HOSPITAL ENCOUNTER (OUTPATIENT)
Dept: GENERAL RADIOLOGY | Age: 64
Discharge: HOME | End: 2024-09-13
Payer: COMMERCIAL

## 2024-09-11 VITALS
BODY MASS INDEX: 27.83 KG/M2 | WEIGHT: 173.2 LBS | OXYGEN SATURATION: 96 % | HEIGHT: 66 IN | SYSTOLIC BLOOD PRESSURE: 110 MMHG | RESPIRATION RATE: 18 BRPM | DIASTOLIC BLOOD PRESSURE: 68 MMHG | HEART RATE: 88 BPM

## 2024-09-11 DIAGNOSIS — M79.672 PAIN OF LEFT HEEL: ICD-10-CM

## 2024-09-11 DIAGNOSIS — E66.9 OBESITY WITH SERIOUS COMORBIDITY, UNSPECIFIED CLASSIFICATION, UNSPECIFIED OBESITY TYPE: ICD-10-CM

## 2024-09-11 DIAGNOSIS — M79.672 PAIN OF LEFT HEEL: Primary | ICD-10-CM

## 2024-09-11 DIAGNOSIS — E11.9 TYPE 2 DIABETES MELLITUS WITHOUT COMPLICATION, UNSPECIFIED WHETHER LONG TERM INSULIN USE (HCC): ICD-10-CM

## 2024-09-11 DIAGNOSIS — M16.12 PRIMARY OSTEOARTHRITIS OF LEFT HIP: ICD-10-CM

## 2024-09-11 PROCEDURE — 99214 OFFICE O/P EST MOD 30 MIN: CPT | Performed by: NURSE PRACTITIONER

## 2024-09-11 PROCEDURE — 3044F HG A1C LEVEL LT 7.0%: CPT | Performed by: NURSE PRACTITIONER

## 2024-09-11 PROCEDURE — 2500000003 HC RX 250 WO HCPCS

## 2024-09-11 PROCEDURE — 6360000004 HC RX CONTRAST MEDICATION: Performed by: STUDENT IN AN ORGANIZED HEALTH CARE EDUCATION/TRAINING PROGRAM

## 2024-09-11 PROCEDURE — 3074F SYST BP LT 130 MM HG: CPT | Performed by: NURSE PRACTITIONER

## 2024-09-11 PROCEDURE — 73650 X-RAY EXAM OF HEEL: CPT

## 2024-09-11 PROCEDURE — 77002 NEEDLE LOCALIZATION BY XRAY: CPT

## 2024-09-11 PROCEDURE — 3078F DIAST BP <80 MM HG: CPT | Performed by: NURSE PRACTITIONER

## 2024-09-11 PROCEDURE — 6360000002 HC RX W HCPCS

## 2024-09-11 RX ORDER — PHENTERMINE HYDROCHLORIDE 37.5 MG/1
37.5 TABLET ORAL
Qty: 30 TABLET | Refills: 0 | Status: SHIPPED | OUTPATIENT
Start: 2024-09-11 | End: 2024-10-11

## 2024-09-11 RX ADMIN — IOHEXOL 2 ML: 240 INJECTION, SOLUTION INTRATHECAL; INTRAVASCULAR; INTRAVENOUS; ORAL at 13:14

## 2024-09-11 ASSESSMENT — PATIENT HEALTH QUESTIONNAIRE - PHQ9
7. TROUBLE CONCENTRATING ON THINGS, SUCH AS READING THE NEWSPAPER OR WATCHING TELEVISION: NOT AT ALL
SUM OF ALL RESPONSES TO PHQ QUESTIONS 1-9: 0
SUM OF ALL RESPONSES TO PHQ QUESTIONS 1-9: 0
3. TROUBLE FALLING OR STAYING ASLEEP: NOT AT ALL
6. FEELING BAD ABOUT YOURSELF - OR THAT YOU ARE A FAILURE OR HAVE LET YOURSELF OR YOUR FAMILY DOWN: NOT AT ALL
SUM OF ALL RESPONSES TO PHQ QUESTIONS 1-9: 0
9. THOUGHTS THAT YOU WOULD BE BETTER OFF DEAD, OR OF HURTING YOURSELF: NOT AT ALL
8. MOVING OR SPEAKING SO SLOWLY THAT OTHER PEOPLE COULD HAVE NOTICED. OR THE OPPOSITE, BEING SO FIGETY OR RESTLESS THAT YOU HAVE BEEN MOVING AROUND A LOT MORE THAN USUAL: NOT AT ALL
1. LITTLE INTEREST OR PLEASURE IN DOING THINGS: NOT AT ALL
5. POOR APPETITE OR OVEREATING: NOT AT ALL
4. FEELING TIRED OR HAVING LITTLE ENERGY: NOT AT ALL
2. FEELING DOWN, DEPRESSED OR HOPELESS: NOT AT ALL
10. IF YOU CHECKED OFF ANY PROBLEMS, HOW DIFFICULT HAVE THESE PROBLEMS MADE IT FOR YOU TO DO YOUR WORK, TAKE CARE OF THINGS AT HOME, OR GET ALONG WITH OTHER PEOPLE: NOT DIFFICULT AT ALL
SUM OF ALL RESPONSES TO PHQ QUESTIONS 1-9: 0
SUM OF ALL RESPONSES TO PHQ9 QUESTIONS 1 & 2: 0

## 2024-09-16 DIAGNOSIS — M77.32 CALCANEAL SPUR OF LEFT FOOT: Primary | ICD-10-CM

## 2024-09-16 DIAGNOSIS — M79.672 PAIN OF LEFT HEEL: ICD-10-CM

## 2024-09-17 ENCOUNTER — HOSPITAL ENCOUNTER (OUTPATIENT)
Age: 64
Discharge: HOME | End: 2024-09-17
Payer: COMMERCIAL

## 2024-09-17 LAB
25(OH)D3 SERPL-MCNC: 58.5 NG/ML (ref 30–100)
CALCIUM SERPL-MCNC: 10 MG/DL (ref 8.6–10.4)
PTH-INTACT SERPL-MCNC: 23 PG/ML (ref 15–65)

## 2024-09-17 PROCEDURE — 83970 ASSAY OF PARATHORMONE: CPT

## 2024-09-17 PROCEDURE — 82306 VITAMIN D 25 HYDROXY: CPT

## 2024-09-17 PROCEDURE — 36415 COLL VENOUS BLD VENIPUNCTURE: CPT

## 2024-09-17 PROCEDURE — 82523 COLLAGEN CROSSLINKS: CPT

## 2024-09-17 PROCEDURE — 82310 ASSAY OF CALCIUM: CPT

## 2024-09-19 LAB — COLLAGEN CTX SERPL-MCNC: 129 PG/ML

## 2024-09-30 ENCOUNTER — TELEPHONE (OUTPATIENT)
Dept: FAMILY MEDICINE CLINIC | Age: 64
End: 2024-09-30

## 2024-09-30 NOTE — TELEPHONE ENCOUNTER
Pt calling stating she is canceling her appt and has quit taking her Adipex, pt states it keeping her awake and she's not able to sleep, pt does not want to try anything else, any questions call pt.

## 2024-11-06 ENCOUNTER — OFFICE VISIT (OUTPATIENT)
Dept: ORTHOPEDIC SURGERY | Age: 64
End: 2024-11-06
Payer: COMMERCIAL

## 2024-11-06 VITALS
SYSTOLIC BLOOD PRESSURE: 120 MMHG | OXYGEN SATURATION: 97 % | HEART RATE: 90 BPM | BODY MASS INDEX: 27.8 KG/M2 | WEIGHT: 173 LBS | RESPIRATION RATE: 16 BRPM | HEIGHT: 66 IN | DIASTOLIC BLOOD PRESSURE: 80 MMHG

## 2024-11-06 DIAGNOSIS — M25.552 LEFT HIP PAIN: Primary | ICD-10-CM

## 2024-11-06 PROCEDURE — 99213 OFFICE O/P EST LOW 20 MIN: CPT | Performed by: STUDENT IN AN ORGANIZED HEALTH CARE EDUCATION/TRAINING PROGRAM

## 2024-11-06 PROCEDURE — 3074F SYST BP LT 130 MM HG: CPT | Performed by: STUDENT IN AN ORGANIZED HEALTH CARE EDUCATION/TRAINING PROGRAM

## 2024-11-06 PROCEDURE — 3079F DIAST BP 80-89 MM HG: CPT | Performed by: STUDENT IN AN ORGANIZED HEALTH CARE EDUCATION/TRAINING PROGRAM

## 2024-11-06 RX ORDER — TRAZODONE HYDROCHLORIDE 100 MG/1
TABLET ORAL
COMMUNITY
Start: 2024-10-28

## 2024-11-06 NOTE — PROGRESS NOTES
PHYSICAL EXAM:  /80   Pulse 90   Resp 16   Ht 1.676 m (5' 6\")   Wt 78.5 kg (173 lb)   LMP 08/28/2016   SpO2 97%   BMI 27.92 kg/m²   Gen: AAOx3, NAD, appears  stated age  CV: no dependent edema, distal pulses 2+  Chest: unlabored respirations, equal chest rise bilaterally, no audible wheezes    Ortho Exam  Extremity:LLE: Skin intact. No swelling or erythema. Mild pain with log roll. Compartments soft. 2+ DP pulse. TA/EHL/FHL/GS motor intact. Deep and Superficial Peroneal/Saphenous/Sural SILT.      Radiology:   XR left hip demonstrates mild OA  ASSESSMENT:     1. Left hip pain         PLAN:  Assessment & Plan   Pt with no success with prior GT injection or intra-articular injection.  Will order MRI of the left hip.  Will see her back upon completion of MRI  Return in about 4 weeks (around 12/4/2024).    No orders of the defined types were placed in this encounter.      Orders Placed This Encounter   Procedures    MRI HIP LEFT WO CONTRAST     Standing Status:   Future     Standing Expiration Date:   11/6/2025        Electronically signed by Trever Le DO on 11/6/2024 at 11:35 AM

## 2024-11-18 ENCOUNTER — TELEPHONE (OUTPATIENT)
Dept: FAMILY MEDICINE CLINIC | Age: 64
End: 2024-11-18

## 2024-11-20 ENCOUNTER — HOSPITAL ENCOUNTER (OUTPATIENT)
Dept: MRI IMAGING | Age: 64
Discharge: HOME | End: 2024-11-22
Attending: STUDENT IN AN ORGANIZED HEALTH CARE EDUCATION/TRAINING PROGRAM
Payer: COMMERCIAL

## 2024-11-20 DIAGNOSIS — M25.552 LEFT HIP PAIN: ICD-10-CM

## 2024-11-20 PROCEDURE — 73721 MRI JNT OF LWR EXTRE W/O DYE: CPT

## 2024-11-25 ENCOUNTER — HOSPITAL ENCOUNTER (OUTPATIENT)
Age: 64
Discharge: HOME | End: 2024-11-25
Payer: COMMERCIAL

## 2024-11-25 DIAGNOSIS — I10 ESSENTIAL HYPERTENSION: ICD-10-CM

## 2024-11-25 LAB
ALBUMIN SERPL-MCNC: 4.9 G/DL (ref 3.5–5.2)
ALBUMIN/GLOB SERPL: 1.8 {RATIO} (ref 1–2.5)
ALP SERPL-CCNC: 137 U/L (ref 35–104)
ALT SERPL-CCNC: 31 U/L (ref 5–33)
ANION GAP SERPL CALCULATED.3IONS-SCNC: 14 MMOL/L (ref 9–17)
AST SERPL-CCNC: 27 U/L
BILIRUB SERPL-MCNC: 0.4 MG/DL (ref 0.3–1.2)
BUN SERPL-MCNC: 14 MG/DL (ref 8–23)
BUN/CREAT SERPL: 13 (ref 9–20)
CALCIUM SERPL-MCNC: 10.1 MG/DL (ref 8.6–10.4)
CHLORIDE SERPL-SCNC: 102 MMOL/L (ref 98–107)
CO2 SERPL-SCNC: 27 MMOL/L (ref 20–31)
CREAT SERPL-MCNC: 1.1 MG/DL (ref 0.5–0.9)
CREAT UR-MCNC: 113 MG/DL (ref 28–217)
EST. AVERAGE GLUCOSE BLD GHB EST-MCNC: 134 MG/DL
GFR, ESTIMATED: 57 ML/MIN/1.73M2
GLUCOSE SERPL-MCNC: 151 MG/DL (ref 70–99)
HBA1C MFR BLD: 6.3 % (ref 4–6)
MICROALBUMIN UR-MCNC: 15 MG/L (ref 0–20)
MICROALBUMIN/CREAT UR-RTO: 13 MCG/MG CREAT (ref 0–25)
POTASSIUM SERPL-SCNC: 4.5 MMOL/L (ref 3.7–5.3)
PROT SERPL-MCNC: 7.6 G/DL (ref 6.4–8.3)
SODIUM SERPL-SCNC: 143 MMOL/L (ref 135–144)

## 2024-11-25 PROCEDURE — 83036 HEMOGLOBIN GLYCOSYLATED A1C: CPT

## 2024-11-25 PROCEDURE — 80053 COMPREHEN METABOLIC PANEL: CPT

## 2024-11-25 PROCEDURE — 82043 UR ALBUMIN QUANTITATIVE: CPT

## 2024-11-25 PROCEDURE — 36415 COLL VENOUS BLD VENIPUNCTURE: CPT

## 2024-11-25 PROCEDURE — 82570 ASSAY OF URINE CREATININE: CPT

## 2024-11-27 ENCOUNTER — OFFICE VISIT (OUTPATIENT)
Dept: FAMILY MEDICINE CLINIC | Age: 64
End: 2024-11-27
Payer: COMMERCIAL

## 2024-11-27 VITALS
SYSTOLIC BLOOD PRESSURE: 122 MMHG | HEART RATE: 62 BPM | OXYGEN SATURATION: 100 % | RESPIRATION RATE: 18 BRPM | WEIGHT: 181 LBS | HEIGHT: 66 IN | BODY MASS INDEX: 29.09 KG/M2 | DIASTOLIC BLOOD PRESSURE: 84 MMHG

## 2024-11-27 DIAGNOSIS — E78.2 MIXED HYPERLIPIDEMIA: ICD-10-CM

## 2024-11-27 DIAGNOSIS — Z72.0 TOBACCO ABUSE: ICD-10-CM

## 2024-11-27 DIAGNOSIS — I10 ESSENTIAL HYPERTENSION: Primary | ICD-10-CM

## 2024-11-27 DIAGNOSIS — R73.03 PREDIABETES: ICD-10-CM

## 2024-11-27 DIAGNOSIS — N18.31 STAGE 3A CHRONIC KIDNEY DISEASE (HCC): ICD-10-CM

## 2024-11-27 PROBLEM — I73.9 PERIPHERAL ARTERIAL DISEASE (HCC): Status: RESOLVED | Noted: 2018-03-29 | Resolved: 2024-11-27

## 2024-11-27 PROBLEM — J32.9 CHRONIC SINUSITIS: Status: RESOLVED | Noted: 2018-03-08 | Resolved: 2024-11-27

## 2024-11-27 PROBLEM — H04.129 DRY EYE: Status: RESOLVED | Noted: 2022-03-14 | Resolved: 2024-11-27

## 2024-11-27 PROBLEM — R55 SYNCOPE AND COLLAPSE: Status: RESOLVED | Noted: 2022-10-14 | Resolved: 2024-11-27

## 2024-11-27 PROBLEM — R20.9 SKIN SENSATION DISTURBANCE: Status: RESOLVED | Noted: 2022-10-14 | Resolved: 2024-11-27

## 2024-11-27 PROCEDURE — 3079F DIAST BP 80-89 MM HG: CPT | Performed by: NURSE PRACTITIONER

## 2024-11-27 PROCEDURE — 99214 OFFICE O/P EST MOD 30 MIN: CPT | Performed by: NURSE PRACTITIONER

## 2024-11-27 PROCEDURE — 3074F SYST BP LT 130 MM HG: CPT | Performed by: NURSE PRACTITIONER

## 2024-11-27 SDOH — ECONOMIC STABILITY: INCOME INSECURITY: HOW HARD IS IT FOR YOU TO PAY FOR THE VERY BASICS LIKE FOOD, HOUSING, MEDICAL CARE, AND HEATING?: NOT VERY HARD

## 2024-11-27 SDOH — ECONOMIC STABILITY: FOOD INSECURITY: WITHIN THE PAST 12 MONTHS, YOU WORRIED THAT YOUR FOOD WOULD RUN OUT BEFORE YOU GOT MONEY TO BUY MORE.: NEVER TRUE

## 2024-11-27 SDOH — ECONOMIC STABILITY: FOOD INSECURITY: WITHIN THE PAST 12 MONTHS, THE FOOD YOU BOUGHT JUST DIDN'T LAST AND YOU DIDN'T HAVE MONEY TO GET MORE.: NEVER TRUE

## 2024-11-27 ASSESSMENT — PATIENT HEALTH QUESTIONNAIRE - PHQ9
4. FEELING TIRED OR HAVING LITTLE ENERGY: NOT AT ALL
SUM OF ALL RESPONSES TO PHQ9 QUESTIONS 1 & 2: 0
1. LITTLE INTEREST OR PLEASURE IN DOING THINGS: NOT AT ALL
9. THOUGHTS THAT YOU WOULD BE BETTER OFF DEAD, OR OF HURTING YOURSELF: NOT AT ALL
10. IF YOU CHECKED OFF ANY PROBLEMS, HOW DIFFICULT HAVE THESE PROBLEMS MADE IT FOR YOU TO DO YOUR WORK, TAKE CARE OF THINGS AT HOME, OR GET ALONG WITH OTHER PEOPLE: NOT DIFFICULT AT ALL
3. TROUBLE FALLING OR STAYING ASLEEP: NOT AT ALL
SUM OF ALL RESPONSES TO PHQ QUESTIONS 1-9: 0
8. MOVING OR SPEAKING SO SLOWLY THAT OTHER PEOPLE COULD HAVE NOTICED. OR THE OPPOSITE, BEING SO FIGETY OR RESTLESS THAT YOU HAVE BEEN MOVING AROUND A LOT MORE THAN USUAL: NOT AT ALL
5. POOR APPETITE OR OVEREATING: NOT AT ALL
SUM OF ALL RESPONSES TO PHQ QUESTIONS 1-9: 0
SUM OF ALL RESPONSES TO PHQ QUESTIONS 1-9: 0
6. FEELING BAD ABOUT YOURSELF - OR THAT YOU ARE A FAILURE OR HAVE LET YOURSELF OR YOUR FAMILY DOWN: NOT AT ALL
7. TROUBLE CONCENTRATING ON THINGS, SUCH AS READING THE NEWSPAPER OR WATCHING TELEVISION: NOT AT ALL
2. FEELING DOWN, DEPRESSED OR HOPELESS: NOT AT ALL
SUM OF ALL RESPONSES TO PHQ QUESTIONS 1-9: 0

## 2024-11-27 NOTE — PROGRESS NOTES
mg/dL    Alkaline Phosphatase 137 (H) 35 - 104 U/L    ALT 31 5 - 33 U/L    AST 27 <32 U/L   Hemoglobin A1C    Collection Time: 11/25/24 12:47 PM   Result Value Ref Range    Hemoglobin A1C 6.3 (H) 4.0 - 6.0 %    Estimated Avg Glucose 134 mg/dL   Microalbumin, Ur    Collection Time: 11/25/24 12:55 PM   Result Value Ref Range    Microalb, Ur 15 0 - 20 mg/L    Creatinine, Ur 113.0 28.0 - 217.0 mg/dL    Microalb/Crt. Ratio 13 0.0 - 25.0 mcg/mg creat       Objective:/84   Pulse 62   Resp 18   Ht 1.676 m (5' 6\")   Wt 82.1 kg (181 lb)   LMP 08/28/2016   SpO2 100%   BMI 29.21 kg/m²      Physical Exam  Vitals and nursing note reviewed.   Constitutional:       General: She is not in acute distress.     Appearance: Normal appearance. She is not ill-appearing.   HENT:      Head: Normocephalic.   Cardiovascular:      Rate and Rhythm: Normal rate and regular rhythm.      Heart sounds: Normal heart sounds.   Pulmonary:      Effort: Pulmonary effort is normal.      Breath sounds: Normal breath sounds.   Musculoskeletal:      Right lower leg: No edema.      Left lower leg: No edema.   Skin:     General: Skin is warm and dry.      Findings: No rash.   Neurological:      General: No focal deficit present.      Mental Status: She is alert and oriented to person, place, and time.          Assessment:      1. Essential hypertension    2. Tobacco abuse    3. Mixed hyperlipidemia    4. Prediabetes    5. Stage 3a chronic kidney disease (HCC)           Plan:    -HTN stable. No changes made  -encouraged smoking cessation  -tolerating statin well, reviewed recent labs.   -prediabetes stable continue with diet control  -CKD stable, no med changes needed  -f/u in 6 months    No orders of the defined types were placed in this encounter.     Outpatient Encounter Medications as of 11/27/2024   Medication Sig Dispense Refill    traZODone (DESYREL) 100 MG tablet       escitalopram (LEXAPRO) 10 MG tablet       atorvastatin (LIPITOR) 20 MG

## 2025-02-05 ENCOUNTER — OFFICE VISIT (OUTPATIENT)
Dept: ORTHOPEDIC SURGERY | Age: 65
End: 2025-02-05
Payer: COMMERCIAL

## 2025-02-05 VITALS
WEIGHT: 181 LBS | SYSTOLIC BLOOD PRESSURE: 126 MMHG | BODY MASS INDEX: 29.09 KG/M2 | HEIGHT: 66 IN | DIASTOLIC BLOOD PRESSURE: 78 MMHG

## 2025-02-05 DIAGNOSIS — M25.552 LEFT HIP PAIN: Primary | ICD-10-CM

## 2025-02-05 PROCEDURE — 3078F DIAST BP <80 MM HG: CPT | Performed by: STUDENT IN AN ORGANIZED HEALTH CARE EDUCATION/TRAINING PROGRAM

## 2025-02-05 PROCEDURE — 3074F SYST BP LT 130 MM HG: CPT | Performed by: STUDENT IN AN ORGANIZED HEALTH CARE EDUCATION/TRAINING PROGRAM

## 2025-02-05 PROCEDURE — 99213 OFFICE O/P EST LOW 20 MIN: CPT | Performed by: STUDENT IN AN ORGANIZED HEALTH CARE EDUCATION/TRAINING PROGRAM

## 2025-02-05 RX ORDER — MELOXICAM 15 MG/1
15 TABLET ORAL DAILY
Qty: 30 TABLET | Refills: 3 | Status: SHIPPED | OUTPATIENT
Start: 2025-02-05

## 2025-02-05 NOTE — PROGRESS NOTES
MG capsule TAKE 1 CAPSULE BY MOUTH IN THE  MORNING 90 capsule 3    alendronate (FOSAMAX) 70 MG tablet alendronate (FOSAMAX) 70 mg tablet      cinacalcet (SENSIPAR) 30 MG tablet       omeprazole (PRILOSEC) 20 MG delayed release capsule Take 1 capsule by mouth daily 90 capsule 1    Blood Pressure KIT Check blood pressure daily and as needed E11.9 (hypertension) 1 kit 0    Blood Pressure Monitor CARY Monitor BP daily; Dx. Hypertension (I10) 1 Device 0    Insulin Pen Needle ([employer] PEN NEEDLES) 31G X 6 MM MISC 1 each by Does not apply route daily 100 each 3    Glucose Blood (BLOOD GLUCOSE TEST STRIPS) STRP Diagnosis 250.90, tests once daily 50 strip 11    Lancets MISC Diagnosis 250.90, tests once daily 50 each 11    lamoTRIgine (LAMICTAL) 200 MG tablet Take 1 tablet by mouth daily      Blood Glucose Monitoring Suppl CARY Diagnosis 250.90, tests once daily 1 Device 0    Cholecalciferol (VITAMIN D PO) Take by mouth      SAPHRIS 10 MG SUBL sublingual tablet nightly       Multiple Vitamins-Minerals (MULTIVITAMIN PO) Take  by mouth daily.       No current facility-administered medications for this visit.       Allergies:    Gabapentin, Lyrica [pregabalin], Wellbutrin [bupropion], Ciprofloxacin, and Keflex [cephalexin]    Social History:   Social History     Socioeconomic History    Marital status:      Spouse name: Not on file    Number of children: Not on file    Years of education: Not on file    Highest education level: Not on file   Occupational History    Not on file   Tobacco Use    Smoking status: Every Day     Current packs/day: 0.25     Average packs/day: 0.2 packs/day for 49.9 years (12.5 ttl pk-yrs)     Types: Cigarettes     Start date: 3/18/1975    Smokeless tobacco: Never   Vaping Use    Vaping status: Never Used   Substance and Sexual Activity    Alcohol use: Yes     Alcohol/week: 0.0 standard drinks of alcohol     Comment: socially    Drug use: Yes     Types: Opiates , Marijuana (Weed)     Comment: Quit

## 2025-02-24 DIAGNOSIS — Z12.31 VISIT FOR SCREENING MAMMOGRAM: Primary | ICD-10-CM

## 2025-04-28 ENCOUNTER — TELEPHONE (OUTPATIENT)
Dept: FAMILY MEDICINE CLINIC | Age: 65
End: 2025-04-28

## 2025-04-28 DIAGNOSIS — E78.2 MIXED HYPERLIPIDEMIA: Primary | ICD-10-CM

## 2025-04-28 DIAGNOSIS — R73.03 PREDIABETES: ICD-10-CM

## 2025-04-28 DIAGNOSIS — N18.31 STAGE 3A CHRONIC KIDNEY DISEASE (HCC): ICD-10-CM

## 2025-04-28 NOTE — TELEPHONE ENCOUNTER
Pt called and rescheduled appt, would like to know if WE wanted labs done, nothing was scheduled but she said he usually orders them. Pt can be reached at [phone]

## 2025-05-14 ENCOUNTER — HOSPITAL ENCOUNTER (OUTPATIENT)
Age: 65
Discharge: HOME | End: 2025-05-14
Payer: COMMERCIAL

## 2025-05-14 ENCOUNTER — OFFICE VISIT (OUTPATIENT)
Dept: FAMILY MEDICINE CLINIC | Age: 65
End: 2025-05-14
Payer: COMMERCIAL

## 2025-05-14 VITALS
BODY MASS INDEX: 27.97 KG/M2 | HEIGHT: 66 IN | RESPIRATION RATE: 16 BRPM | HEART RATE: 97 BPM | WEIGHT: 174 LBS | DIASTOLIC BLOOD PRESSURE: 76 MMHG | SYSTOLIC BLOOD PRESSURE: 128 MMHG | OXYGEN SATURATION: 97 %

## 2025-05-14 DIAGNOSIS — I10 ESSENTIAL HYPERTENSION: ICD-10-CM

## 2025-05-14 DIAGNOSIS — E78.2 MIXED HYPERLIPIDEMIA: ICD-10-CM

## 2025-05-14 DIAGNOSIS — R73.03 PREDIABETES: Primary | ICD-10-CM

## 2025-05-14 DIAGNOSIS — R73.03 PREDIABETES: ICD-10-CM

## 2025-05-14 DIAGNOSIS — N18.31 STAGE 3A CHRONIC KIDNEY DISEASE (HCC): ICD-10-CM

## 2025-05-14 DIAGNOSIS — E66.9 OBESITY, UNSPECIFIED CLASS, UNSPECIFIED OBESITY TYPE, UNSPECIFIED WHETHER SERIOUS COMORBIDITY PRESENT: ICD-10-CM

## 2025-05-14 LAB
ALBUMIN SERPL-MCNC: 4.8 G/DL (ref 3.5–5.2)
ALBUMIN/GLOB SERPL: 1.8 {RATIO} (ref 1–2.5)
ALP SERPL-CCNC: 156 U/L (ref 35–104)
ALT SERPL-CCNC: 26 U/L (ref 5–33)
ANION GAP SERPL CALCULATED.3IONS-SCNC: 18 MMOL/L (ref 9–17)
AST SERPL-CCNC: 23 U/L
BILIRUB SERPL-MCNC: 0.4 MG/DL (ref 0.3–1.2)
BUN SERPL-MCNC: 20 MG/DL (ref 8–23)
BUN/CREAT SERPL: 20 (ref 9–20)
CALCIUM SERPL-MCNC: 10 MG/DL (ref 8.6–10.4)
CHLORIDE SERPL-SCNC: 99 MMOL/L (ref 98–107)
CHOLEST SERPL-MCNC: 222 MG/DL (ref 0–199)
CHOLESTEROL/HDL RATIO: 4.6
CO2 SERPL-SCNC: 24 MMOL/L (ref 20–31)
CREAT SERPL-MCNC: 1 MG/DL (ref 0.5–0.9)
EST. AVERAGE GLUCOSE BLD GHB EST-MCNC: 174 MG/DL
GFR, ESTIMATED: 63 ML/MIN/1.73M2
GLUCOSE SERPL-MCNC: 189 MG/DL (ref 70–99)
HBA1C MFR BLD: 7.7 % (ref 4–6)
HDLC SERPL-MCNC: 48 MG/DL
LDLC SERPL CALC-MCNC: 119 MG/DL (ref 0–100)
POTASSIUM SERPL-SCNC: 3.6 MMOL/L (ref 3.7–5.3)
PROT SERPL-MCNC: 7.5 G/DL (ref 6.4–8.3)
SODIUM SERPL-SCNC: 141 MMOL/L (ref 135–144)
TRIGL SERPL-MCNC: 276 MG/DL
VLDLC SERPL CALC-MCNC: 55 MG/DL (ref 1–30)

## 2025-05-14 PROCEDURE — 80061 LIPID PANEL: CPT

## 2025-05-14 PROCEDURE — 3074F SYST BP LT 130 MM HG: CPT | Performed by: NURSE PRACTITIONER

## 2025-05-14 PROCEDURE — 80053 COMPREHEN METABOLIC PANEL: CPT

## 2025-05-14 PROCEDURE — 3078F DIAST BP <80 MM HG: CPT | Performed by: NURSE PRACTITIONER

## 2025-05-14 PROCEDURE — 99214 OFFICE O/P EST MOD 30 MIN: CPT | Performed by: NURSE PRACTITIONER

## 2025-05-14 PROCEDURE — 83036 HEMOGLOBIN GLYCOSYLATED A1C: CPT

## 2025-05-14 PROCEDURE — 36415 COLL VENOUS BLD VENIPUNCTURE: CPT

## 2025-05-14 RX ORDER — ATORVASTATIN CALCIUM 20 MG/1
20 TABLET, FILM COATED ORAL DAILY
Qty: 90 TABLET | Refills: 3 | Status: SHIPPED | OUTPATIENT
Start: 2025-05-14

## 2025-05-14 RX ORDER — HYDROCHLOROTHIAZIDE 12.5 MG/1
CAPSULE ORAL
Qty: 90 CAPSULE | Refills: 3 | Status: SHIPPED | OUTPATIENT
Start: 2025-05-14

## 2025-05-14 ASSESSMENT — PATIENT HEALTH QUESTIONNAIRE - PHQ9
SUM OF ALL RESPONSES TO PHQ QUESTIONS 1-9: 0
SUM OF ALL RESPONSES TO PHQ QUESTIONS 1-9: 0
8. MOVING OR SPEAKING SO SLOWLY THAT OTHER PEOPLE COULD HAVE NOTICED. OR THE OPPOSITE, BEING SO FIGETY OR RESTLESS THAT YOU HAVE BEEN MOVING AROUND A LOT MORE THAN USUAL: NOT AT ALL
7. TROUBLE CONCENTRATING ON THINGS, SUCH AS READING THE NEWSPAPER OR WATCHING TELEVISION: NOT AT ALL
4. FEELING TIRED OR HAVING LITTLE ENERGY: NOT AT ALL
2. FEELING DOWN, DEPRESSED OR HOPELESS: NOT AT ALL
1. LITTLE INTEREST OR PLEASURE IN DOING THINGS: NOT AT ALL
9. THOUGHTS THAT YOU WOULD BE BETTER OFF DEAD, OR OF HURTING YOURSELF: NOT AT ALL
SUM OF ALL RESPONSES TO PHQ QUESTIONS 1-9: 0
3. TROUBLE FALLING OR STAYING ASLEEP: NOT AT ALL
6. FEELING BAD ABOUT YOURSELF - OR THAT YOU ARE A FAILURE OR HAVE LET YOURSELF OR YOUR FAMILY DOWN: NOT AT ALL
10. IF YOU CHECKED OFF ANY PROBLEMS, HOW DIFFICULT HAVE THESE PROBLEMS MADE IT FOR YOU TO DO YOUR WORK, TAKE CARE OF THINGS AT HOME, OR GET ALONG WITH OTHER PEOPLE: NOT DIFFICULT AT ALL
SUM OF ALL RESPONSES TO PHQ QUESTIONS 1-9: 0
5. POOR APPETITE OR OVEREATING: NOT AT ALL

## 2025-05-14 NOTE — PROGRESS NOTES
Subjective:      Patient ID: Julissa Phoenix is a 65 y.o. female coming in for   Chief Complaint   Patient presents with    Weight Loss     Discuss weight loss options. She is actively trying to lose weight. She has lost 13 lbs in the last two months. Discuss GLP1 medications.        History of Present Illness        History of Present Illness  The patient is a 65-year-old female who presents to the office for a routine follow-up of hypertension and prediabetes. Her current hypertension regimen includes hydrochlorothiazide 12.5 mg daily. Prediabetes is managed by diet and exercise, and she is due for repeat lab work. She also would like to discuss weight loss options.    She has changed her diet over the past 2 months and has lost 14 pounds but feels like she has hit a plateau. She remains 35 pounds overweight relative to her height and is seeking pharmacological assistance for further weight loss. She has expressed interest in Wegovy, Ozempic, and Mounjaro. She recalls a previous prescription of Adipex in 10/2024, which resulted in sleep disturbances. Her current regimen includes daily exercise on an Pogoapptical machine and a diet consisting of one meal per day, supplemented with fruits and vegetables. She has eliminated late-night snacking on chips, ice cream, and popcorn.    She reports no adverse effects from the hydrochlorothiazide. She reports no chest pain or unusual headaches.    She is due for repeat lab work. Last A1c in 11/2024 was 6.3.    She continues to take over-the-counter heartburn medication every other day, with increased frequency if she consumes wine, which exacerbates her heartburn.    She completed a Cologuard test in 2024. She is scheduled for an ophthalmology appointment on Friday for her cataract, which does not cause her any discomfort.    SOCIAL HISTORY  She admits to smoking and expresses a desire to quit.          Review of Systems     I personally reviewed, medications, history,

## 2025-05-16 ENCOUNTER — RESULTS FOLLOW-UP (OUTPATIENT)
Dept: FAMILY MEDICINE CLINIC | Age: 65
End: 2025-05-16

## 2025-05-16 DIAGNOSIS — E11.9 TYPE 2 DIABETES MELLITUS WITHOUT COMPLICATION, WITHOUT LONG-TERM CURRENT USE OF INSULIN (HCC): Primary | ICD-10-CM

## 2025-05-22 DIAGNOSIS — E66.9 OBESITY, UNSPECIFIED CLASS, UNSPECIFIED OBESITY TYPE, UNSPECIFIED WHETHER SERIOUS COMORBIDITY PRESENT: ICD-10-CM

## 2025-05-22 DIAGNOSIS — R73.03 PREDIABETES: ICD-10-CM

## 2025-07-16 ENCOUNTER — HOSPITAL ENCOUNTER (OUTPATIENT)
Age: 65
Discharge: HOME | End: 2025-07-16
Payer: COMMERCIAL

## 2025-07-16 LAB — CALCIUM SERPL-MCNC: 9.3 MG/DL (ref 8.6–10.4)

## 2025-07-16 PROCEDURE — 36415 COLL VENOUS BLD VENIPUNCTURE: CPT

## 2025-07-16 PROCEDURE — 83970 ASSAY OF PARATHORMONE: CPT

## 2025-07-16 PROCEDURE — 82523 COLLAGEN CROSSLINKS: CPT

## 2025-07-16 PROCEDURE — 82310 ASSAY OF CALCIUM: CPT

## 2025-07-17 LAB — PTH-INTACT SERPL-MCNC: 38 PG/ML (ref 17.9–58.6)

## 2025-07-18 LAB — COLLAGEN CTX SERPL-MCNC: 270 PG/ML

## 2025-08-06 ENCOUNTER — HOSPITAL ENCOUNTER (OUTPATIENT)
Dept: MAMMOGRAPHY | Age: 65
Discharge: HOME | End: 2025-08-08
Payer: COMMERCIAL

## 2025-08-06 VITALS — WEIGHT: 160 LBS | HEIGHT: 66 IN | BODY MASS INDEX: 25.71 KG/M2

## 2025-08-06 DIAGNOSIS — Z12.31 VISIT FOR SCREENING MAMMOGRAM: ICD-10-CM

## 2025-08-06 PROCEDURE — 77063 BREAST TOMOSYNTHESIS BI: CPT
